# Patient Record
Sex: FEMALE | Race: WHITE | NOT HISPANIC OR LATINO | Employment: OTHER | URBAN - METROPOLITAN AREA
[De-identification: names, ages, dates, MRNs, and addresses within clinical notes are randomized per-mention and may not be internally consistent; named-entity substitution may affect disease eponyms.]

---

## 2023-03-17 LAB
ANION GAP IN SER/PLAS: 14 MMOL/L (ref 10–20)
CALCIUM (MG/DL) IN SER/PLAS: 9.8 MG/DL (ref 8.6–10.3)
CARBON DIOXIDE, TOTAL (MMOL/L) IN SER/PLAS: 26 MMOL/L (ref 21–32)
CHLORIDE (MMOL/L) IN SER/PLAS: 104 MMOL/L (ref 98–107)
CHOLESTEROL (MG/DL) IN SER/PLAS: 169 MG/DL (ref 0–199)
CHOLESTEROL IN HDL (MG/DL) IN SER/PLAS: 36.9 MG/DL
CHOLESTEROL/HDL RATIO: 4.6
CREATININE (MG/DL) IN SER/PLAS: 0.61 MG/DL (ref 0.5–1.05)
GFR FEMALE: >90 ML/MIN/1.73M2
GLUCOSE (MG/DL) IN SER/PLAS: 132 MG/DL (ref 74–99)
LDL: 76 MG/DL (ref 0–99)
NON HDL CHOLESTEROL: 132 MG/DL
POTASSIUM (MMOL/L) IN SER/PLAS: 4.1 MMOL/L (ref 3.5–5.3)
SODIUM (MMOL/L) IN SER/PLAS: 140 MMOL/L (ref 136–145)
THYROTROPIN (MIU/L) IN SER/PLAS BY DETECTION LIMIT <= 0.05 MIU/L: 2.53 MIU/L (ref 0.44–3.98)
TRIGLYCERIDE (MG/DL) IN SER/PLAS: 279 MG/DL (ref 0–149)
UREA NITROGEN (MG/DL) IN SER/PLAS: 22 MG/DL (ref 6–23)
VLDL: 56 MG/DL (ref 0–40)

## 2023-11-04 ENCOUNTER — ANCILLARY PROCEDURE (OUTPATIENT)
Dept: RADIOLOGY | Facility: CLINIC | Age: 73
End: 2023-11-04
Payer: MEDICARE

## 2023-11-04 DIAGNOSIS — M81.0 AGE-RELATED OSTEOPOROSIS WITHOUT CURRENT PATHOLOGICAL FRACTURE: ICD-10-CM

## 2023-11-04 PROCEDURE — 77080 DXA BONE DENSITY AXIAL: CPT | Mod: ONBASE

## 2023-11-04 PROCEDURE — 77080 DXA BONE DENSITY AXIAL: CPT | Mod: ONBASE | Performed by: RADIOLOGY

## 2024-07-15 ENCOUNTER — HOSPITAL ENCOUNTER (INPATIENT)
Facility: HOSPITAL | Age: 74
DRG: 493 | End: 2024-07-15
Attending: ORTHOPAEDIC SURGERY | Admitting: ORTHOPAEDIC SURGERY
Payer: MEDICARE

## 2024-07-15 DIAGNOSIS — S82.892A CLOSED LEFT ANKLE FRACTURE, INITIAL ENCOUNTER: Primary | ICD-10-CM

## 2024-07-15 DIAGNOSIS — I50.30: ICD-10-CM

## 2024-07-15 DIAGNOSIS — I25.10: ICD-10-CM

## 2024-07-15 DIAGNOSIS — M79.89 OTHER SPECIFIED SOFT TISSUE DISORDERS: ICD-10-CM

## 2024-07-15 DIAGNOSIS — R22.43 LOCALIZED SWELLING, MASS AND LUMP, LOWER LIMB, BILATERAL: ICD-10-CM

## 2024-07-15 PROCEDURE — 1100000001 HC PRIVATE ROOM DAILY

## 2024-07-16 ENCOUNTER — ANESTHESIA (OUTPATIENT)
Dept: OPERATING ROOM | Facility: HOSPITAL | Age: 74
End: 2024-07-16
Payer: MEDICARE

## 2024-07-16 ENCOUNTER — APPOINTMENT (OUTPATIENT)
Dept: RADIOLOGY | Facility: HOSPITAL | Age: 74
DRG: 493 | End: 2024-07-16
Payer: MEDICARE

## 2024-07-16 ENCOUNTER — APPOINTMENT (OUTPATIENT)
Dept: CARDIOLOGY | Facility: HOSPITAL | Age: 74
DRG: 493 | End: 2024-07-16
Payer: MEDICARE

## 2024-07-16 ENCOUNTER — APPOINTMENT (OUTPATIENT)
Dept: VASCULAR MEDICINE | Facility: HOSPITAL | Age: 74
DRG: 493 | End: 2024-07-16
Payer: MEDICARE

## 2024-07-16 ENCOUNTER — ANESTHESIA EVENT (OUTPATIENT)
Dept: OPERATING ROOM | Facility: HOSPITAL | Age: 74
End: 2024-07-16
Payer: MEDICARE

## 2024-07-16 PROBLEM — S82.892A CLOSED LEFT ANKLE FRACTURE, INITIAL ENCOUNTER: Status: ACTIVE | Noted: 2024-07-16

## 2024-07-16 LAB
ABO GROUP (TYPE) IN BLOOD: NORMAL
ABO GROUP (TYPE) IN BLOOD: NORMAL
ANION GAP SERPL CALC-SCNC: 16 MMOL/L (ref 10–20)
ANTIBODY SCREEN: NORMAL
AORTIC VALVE PEAK VELOCITY: 1.25 M/S
APTT PPP: 27 SECONDS (ref 27–38)
AV PEAK GRADIENT: 6.3 MMHG
AVA (PEAK VEL): 2.61 CM2
BASOPHILS # BLD AUTO: 0.02 X10*3/UL (ref 0–0.1)
BASOPHILS NFR BLD AUTO: 0.4 %
BNP SERPL-MCNC: 127 PG/ML (ref 0–99)
BUN SERPL-MCNC: 12 MG/DL (ref 6–23)
CALCIUM SERPL-MCNC: 8.3 MG/DL (ref 8.6–10.6)
CHLORIDE SERPL-SCNC: 105 MMOL/L (ref 98–107)
CO2 SERPL-SCNC: 23 MMOL/L (ref 21–32)
CREAT SERPL-MCNC: 0.57 MG/DL (ref 0.5–1.05)
EGFRCR SERPLBLD CKD-EPI 2021: >90 ML/MIN/1.73M*2
EJECTION FRACTION APICAL 4 CHAMBER: 72.8
EJECTION FRACTION: 63 %
EOSINOPHIL # BLD AUTO: 0.24 X10*3/UL (ref 0–0.4)
EOSINOPHIL NFR BLD AUTO: 4.7 %
ERYTHROCYTE [DISTWIDTH] IN BLOOD BY AUTOMATED COUNT: 15.4 % (ref 11.5–14.5)
ERYTHROCYTE [DISTWIDTH] IN BLOOD BY AUTOMATED COUNT: 15.5 % (ref 11.5–14.5)
FERRITIN SERPL-MCNC: 38 NG/ML (ref 8–150)
GLUCOSE SERPL-MCNC: 100 MG/DL (ref 74–99)
HCT VFR BLD AUTO: 28.2 % (ref 36–46)
HCT VFR BLD AUTO: 33.6 % (ref 36–46)
HGB BLD-MCNC: 10.4 G/DL (ref 12–16)
HGB BLD-MCNC: 9 G/DL (ref 12–16)
IMM GRANULOCYTES # BLD AUTO: 0.01 X10*3/UL (ref 0–0.5)
IMM GRANULOCYTES NFR BLD AUTO: 0.2 % (ref 0–0.9)
INR PPP: 1.1 (ref 0.9–1.1)
IRON SATN MFR SERPL: 10 % (ref 25–45)
IRON SERPL-MCNC: 34 UG/DL (ref 35–150)
LEFT VENTRICULAR OUTFLOW TRACT DIAMETER: 2 CM
LYMPHOCYTES # BLD AUTO: 1.44 X10*3/UL (ref 0.8–3)
LYMPHOCYTES NFR BLD AUTO: 28 %
MCH RBC QN AUTO: 24.1 PG (ref 26–34)
MCH RBC QN AUTO: 24.1 PG (ref 26–34)
MCHC RBC AUTO-ENTMCNC: 31 G/DL (ref 32–36)
MCHC RBC AUTO-ENTMCNC: 31.9 G/DL (ref 32–36)
MCV RBC AUTO: 75 FL (ref 80–100)
MCV RBC AUTO: 78 FL (ref 80–100)
MITRAL VALVE E/A RATIO: 0.93
MONOCYTES # BLD AUTO: 0.39 X10*3/UL (ref 0.05–0.8)
MONOCYTES NFR BLD AUTO: 7.6 %
NEUTROPHILS # BLD AUTO: 3.05 X10*3/UL (ref 1.6–5.5)
NEUTROPHILS NFR BLD AUTO: 59.1 %
NRBC BLD-RTO: 0 /100 WBCS (ref 0–0)
NRBC BLD-RTO: 0 /100 WBCS (ref 0–0)
PLATELET # BLD AUTO: 207 X10*3/UL (ref 150–450)
PLATELET # BLD AUTO: 228 X10*3/UL (ref 150–450)
POTASSIUM SERPL-SCNC: 3.4 MMOL/L (ref 3.5–5.3)
PROTHROMBIN TIME: 12.2 SECONDS (ref 9.8–12.8)
RBC # BLD AUTO: 3.74 X10*6/UL (ref 4–5.2)
RBC # BLD AUTO: 4.32 X10*6/UL (ref 4–5.2)
RH FACTOR (ANTIGEN D): NORMAL
RH FACTOR (ANTIGEN D): NORMAL
RIGHT VENTRICLE FREE WALL PEAK S': 13.3 CM/S
SODIUM SERPL-SCNC: 141 MMOL/L (ref 136–145)
TIBC SERPL-MCNC: 353 UG/DL (ref 240–445)
TRICUSPID ANNULAR PLANE SYSTOLIC EXCURSION: 2.2 CM
UIBC SERPL-MCNC: 319 UG/DL (ref 110–370)
WBC # BLD AUTO: 5.2 X10*3/UL (ref 4.4–11.3)
WBC # BLD AUTO: 6.7 X10*3/UL (ref 4.4–11.3)

## 2024-07-16 PROCEDURE — 93970 EXTREMITY STUDY: CPT | Performed by: SURGERY

## 2024-07-16 PROCEDURE — 2500000004 HC RX 250 GENERAL PHARMACY W/ HCPCS (ALT 636 FOR OP/ED): Mod: JZ | Performed by: ANESTHESIOLOGIST ASSISTANT

## 2024-07-16 PROCEDURE — 99223 1ST HOSP IP/OBS HIGH 75: CPT | Performed by: STUDENT IN AN ORGANIZED HEALTH CARE EDUCATION/TRAINING PROGRAM

## 2024-07-16 PROCEDURE — 36415 COLL VENOUS BLD VENIPUNCTURE: CPT

## 2024-07-16 PROCEDURE — 2500000004 HC RX 250 GENERAL PHARMACY W/ HCPCS (ALT 636 FOR OP/ED)

## 2024-07-16 PROCEDURE — 0QSK04Z REPOSITION LEFT FIBULA WITH INTERNAL FIXATION DEVICE, OPEN APPROACH: ICD-10-PCS | Performed by: STUDENT IN AN ORGANIZED HEALTH CARE EDUCATION/TRAINING PROGRAM

## 2024-07-16 PROCEDURE — 93010 ELECTROCARDIOGRAM REPORT: CPT | Performed by: INTERNAL MEDICINE

## 2024-07-16 PROCEDURE — 0QPHX5Z REMOVAL OF EXTERNAL FIXATION DEVICE FROM LEFT TIBIA, EXTERNAL APPROACH: ICD-10-PCS | Performed by: STUDENT IN AN ORGANIZED HEALTH CARE EDUCATION/TRAINING PROGRAM

## 2024-07-16 PROCEDURE — 2500000004 HC RX 250 GENERAL PHARMACY W/ HCPCS (ALT 636 FOR OP/ED): Mod: JZ

## 2024-07-16 PROCEDURE — 1100000001 HC PRIVATE ROOM DAILY

## 2024-07-16 PROCEDURE — 3700000001 HC GENERAL ANESTHESIA TIME - INITIAL BASE CHARGE: Performed by: ORTHOPAEDIC SURGERY

## 2024-07-16 PROCEDURE — 0QSH04Z REPOSITION LEFT TIBIA WITH INTERNAL FIXATION DEVICE, OPEN APPROACH: ICD-10-PCS | Performed by: STUDENT IN AN ORGANIZED HEALTH CARE EDUCATION/TRAINING PROGRAM

## 2024-07-16 PROCEDURE — 85027 COMPLETE CBC AUTOMATED: CPT

## 2024-07-16 PROCEDURE — 85025 COMPLETE CBC W/AUTO DIFF WBC: CPT

## 2024-07-16 PROCEDURE — 99222 1ST HOSP IP/OBS MODERATE 55: CPT

## 2024-07-16 PROCEDURE — 73700 CT LOWER EXTREMITY W/O DYE: CPT | Mod: LEFT SIDE | Performed by: RADIOLOGY

## 2024-07-16 PROCEDURE — 2500000005 HC RX 250 GENERAL PHARMACY W/O HCPCS: Performed by: ORTHOPAEDIC SURGERY

## 2024-07-16 PROCEDURE — 73610 X-RAY EXAM OF ANKLE: CPT | Mod: LEFT SIDE | Performed by: STUDENT IN AN ORGANIZED HEALTH CARE EDUCATION/TRAINING PROGRAM

## 2024-07-16 PROCEDURE — 93306 TTE W/DOPPLER COMPLETE: CPT

## 2024-07-16 PROCEDURE — 7100000001 HC RECOVERY ROOM TIME - INITIAL BASE CHARGE: Performed by: ORTHOPAEDIC SURGERY

## 2024-07-16 PROCEDURE — 83880 ASSAY OF NATRIURETIC PEPTIDE: CPT | Performed by: ANESTHESIOLOGY

## 2024-07-16 PROCEDURE — 85610 PROTHROMBIN TIME: CPT

## 2024-07-16 PROCEDURE — 2500000005 HC RX 250 GENERAL PHARMACY W/O HCPCS: Performed by: ANESTHESIOLOGIST ASSISTANT

## 2024-07-16 PROCEDURE — 2780000003 HC OR 278 NO HCPCS: Performed by: ORTHOPAEDIC SURGERY

## 2024-07-16 PROCEDURE — 82374 ASSAY BLOOD CARBON DIOXIDE: CPT

## 2024-07-16 PROCEDURE — 3700000002 HC GENERAL ANESTHESIA TIME - EACH INCREMENTAL 1 MINUTE: Performed by: ORTHOPAEDIC SURGERY

## 2024-07-16 PROCEDURE — 82728 ASSAY OF FERRITIN: CPT | Performed by: ANESTHESIOLOGY

## 2024-07-16 PROCEDURE — 71045 X-RAY EXAM CHEST 1 VIEW: CPT

## 2024-07-16 PROCEDURE — 20692 APPL MLTPLN UNI EXT FIXJ SYS: CPT | Performed by: ORTHOPAEDIC SURGERY

## 2024-07-16 PROCEDURE — 2720000007 HC OR 272 NO HCPCS: Performed by: ORTHOPAEDIC SURGERY

## 2024-07-16 PROCEDURE — C1713 ANCHOR/SCREW BN/BN,TIS/BN: HCPCS | Performed by: ORTHOPAEDIC SURGERY

## 2024-07-16 PROCEDURE — 73700 CT LOWER EXTREMITY W/O DYE: CPT | Mod: LT

## 2024-07-16 PROCEDURE — 3600000004 HC OR TIME - INITIAL BASE CHARGE - PROCEDURE LEVEL FOUR: Performed by: ORTHOPAEDIC SURGERY

## 2024-07-16 PROCEDURE — 93970 EXTREMITY STUDY: CPT

## 2024-07-16 PROCEDURE — 83540 ASSAY OF IRON: CPT | Performed by: ANESTHESIOLOGY

## 2024-07-16 PROCEDURE — 2500000004 HC RX 250 GENERAL PHARMACY W/ HCPCS (ALT 636 FOR OP/ED): Performed by: STUDENT IN AN ORGANIZED HEALTH CARE EDUCATION/TRAINING PROGRAM

## 2024-07-16 PROCEDURE — 73610 X-RAY EXAM OF ANKLE: CPT | Mod: LT

## 2024-07-16 PROCEDURE — 2500000005 HC RX 250 GENERAL PHARMACY W/O HCPCS

## 2024-07-16 PROCEDURE — 93306 TTE W/DOPPLER COMPLETE: CPT | Performed by: INTERNAL MEDICINE

## 2024-07-16 PROCEDURE — 86901 BLOOD TYPING SEROLOGIC RH(D): CPT

## 2024-07-16 PROCEDURE — 71045 X-RAY EXAM CHEST 1 VIEW: CPT | Performed by: RADIOLOGY

## 2024-07-16 PROCEDURE — 99222 1ST HOSP IP/OBS MODERATE 55: CPT | Performed by: STUDENT IN AN ORGANIZED HEALTH CARE EDUCATION/TRAINING PROGRAM

## 2024-07-16 PROCEDURE — 7100000002 HC RECOVERY ROOM TIME - EACH INCREMENTAL 1 MINUTE: Performed by: ORTHOPAEDIC SURGERY

## 2024-07-16 PROCEDURE — 3600000009 HC OR TIME - EACH INCREMENTAL 1 MINUTE - PROCEDURE LEVEL FOUR: Performed by: ORTHOPAEDIC SURGERY

## 2024-07-16 DEVICE — CLAMP, PIN 10H HII MRI: Type: IMPLANTABLE DEVICE | Site: ANKLE | Status: FUNCTIONAL

## 2024-07-16 DEVICE — ROD, CONNECTING 11 X 150 HOFFMANN3: Type: IMPLANTABLE DEVICE | Site: ANKLE | Status: FUNCTIONAL

## 2024-07-16 DEVICE — PIN, HALF 3/5 20 X 120 SD APEX: Type: IMPLANTABLE DEVICE | Site: ANKLE | Status: FUNCTIONAL

## 2024-07-16 DEVICE — PIN, HALF 5 X 150 SD 50/THR APEX: Type: IMPLANTABLE DEVICE | Site: ANKLE | Status: FUNCTIONAL

## 2024-07-16 DEVICE — CAP, PROTECTIVE 5MM BLUE: Type: IMPLANTABLE DEVICE | Site: ANKLE | Status: NON-FUNCTIONAL

## 2024-07-16 DEVICE — ROD, CONNECTING 11 X 250 HOFFMANN3: Type: IMPLANTABLE DEVICE | Site: ANKLE | Status: FUNCTIONAL

## 2024-07-16 DEVICE — IMPLANTABLE DEVICE: Type: IMPLANTABLE DEVICE | Site: ANKLE | Status: FUNCTIONAL

## 2024-07-16 DEVICE — PIN, TRANSFIX 5/6 40 X 300 APEX: Type: IMPLANTABLE DEVICE | Site: ANKLE | Status: FUNCTIONAL

## 2024-07-16 DEVICE — POST, 30 DEG ANGELED, 11MM: Type: IMPLANTABLE DEVICE | Site: ANKLE | Status: FUNCTIONAL

## 2024-07-16 RX ORDER — SODIUM CHLORIDE, SODIUM LACTATE, POTASSIUM CHLORIDE, CALCIUM CHLORIDE 600; 310; 30; 20 MG/100ML; MG/100ML; MG/100ML; MG/100ML
125 INJECTION, SOLUTION INTRAVENOUS CONTINUOUS
Status: DISCONTINUED | OUTPATIENT
Start: 2024-07-16 | End: 2024-07-16

## 2024-07-16 RX ORDER — SUCRALFATE 1 G/1
1 TABLET ORAL 2 TIMES DAILY
COMMUNITY

## 2024-07-16 RX ORDER — ACETAMINOPHEN 325 MG/1
650 TABLET ORAL EVERY 4 HOURS PRN
Status: DISCONTINUED | OUTPATIENT
Start: 2024-07-16 | End: 2024-07-16 | Stop reason: HOSPADM

## 2024-07-16 RX ORDER — ROCURONIUM BROMIDE 10 MG/ML
INJECTION, SOLUTION INTRAVENOUS AS NEEDED
Status: DISCONTINUED | OUTPATIENT
Start: 2024-07-16 | End: 2024-07-16

## 2024-07-16 RX ORDER — FENTANYL CITRATE 50 UG/ML
INJECTION, SOLUTION INTRAMUSCULAR; INTRAVENOUS AS NEEDED
Status: DISCONTINUED | OUTPATIENT
Start: 2024-07-16 | End: 2024-07-16

## 2024-07-16 RX ORDER — METHOCARBAMOL 100 MG/ML
500 INJECTION, SOLUTION INTRAMUSCULAR; INTRAVENOUS ONCE
Status: DISCONTINUED | OUTPATIENT
Start: 2024-07-16 | End: 2024-07-16

## 2024-07-16 RX ORDER — OXYCODONE HYDROCHLORIDE 5 MG/1
5 TABLET ORAL EVERY 4 HOURS PRN
Status: DISCONTINUED | OUTPATIENT
Start: 2024-07-16 | End: 2024-07-16

## 2024-07-16 RX ORDER — PROPOFOL 10 MG/ML
INJECTION, EMULSION INTRAVENOUS AS NEEDED
Status: DISCONTINUED | OUTPATIENT
Start: 2024-07-16 | End: 2024-07-16

## 2024-07-16 RX ORDER — ACETAMINOPHEN 325 MG/1
650 TABLET ORAL EVERY 4 HOURS PRN
Status: DISCONTINUED | OUTPATIENT
Start: 2024-07-16 | End: 2024-07-16

## 2024-07-16 RX ORDER — ALBUTEROL SULFATE 90 UG/1
2 AEROSOL, METERED RESPIRATORY (INHALATION) EVERY 4 HOURS PRN
COMMUNITY
Start: 2024-07-14

## 2024-07-16 RX ORDER — IPRATROPIUM BROMIDE AND ALBUTEROL SULFATE 2.5; .5 MG/3ML; MG/3ML
3 SOLUTION RESPIRATORY (INHALATION)
COMMUNITY
Start: 2023-08-17

## 2024-07-16 RX ORDER — MONTELUKAST SODIUM 5 MG/1
10 TABLET, CHEWABLE ORAL NIGHTLY
Status: DISCONTINUED | OUTPATIENT
Start: 2024-07-16 | End: 2024-07-16

## 2024-07-16 RX ORDER — SUCRALFATE 1 G/1
1 TABLET ORAL 2 TIMES DAILY
Status: DISCONTINUED | OUTPATIENT
Start: 2024-07-16 | End: 2024-07-16

## 2024-07-16 RX ORDER — FLUTICASONE PROPIONATE 50 MCG
2 SPRAY, SUSPENSION (ML) NASAL 2 TIMES DAILY
Status: DISCONTINUED | OUTPATIENT
Start: 2024-07-16 | End: 2024-07-16

## 2024-07-16 RX ORDER — ENOXAPARIN SODIUM 100 MG/ML
30 INJECTION SUBCUTANEOUS EVERY 12 HOURS
Status: DISPENSED | OUTPATIENT
Start: 2024-07-16 | End: 2024-07-19

## 2024-07-16 RX ORDER — OXYCODONE HYDROCHLORIDE 5 MG/1
5 TABLET ORAL EVERY 6 HOURS PRN
Status: DISCONTINUED | OUTPATIENT
Start: 2024-07-16 | End: 2024-07-22

## 2024-07-16 RX ORDER — ACETAMINOPHEN 10 MG/ML
INJECTION, SOLUTION INTRAVENOUS AS NEEDED
Status: DISCONTINUED | OUTPATIENT
Start: 2024-07-16 | End: 2024-07-16

## 2024-07-16 RX ORDER — ROPIVACAINE IN 0.9% SOD CHL/PF 0.2 %
7 PLASTIC BAG, INJECTION (ML) EPIDURAL CONTINUOUS
Status: DISCONTINUED | OUTPATIENT
Start: 2024-07-16 | End: 2024-07-16

## 2024-07-16 RX ORDER — NALOXONE HYDROCHLORIDE 0.4 MG/ML
0.2 INJECTION, SOLUTION INTRAMUSCULAR; INTRAVENOUS; SUBCUTANEOUS EVERY 5 MIN PRN
Status: DISCONTINUED | OUTPATIENT
Start: 2024-07-16 | End: 2024-07-16

## 2024-07-16 RX ORDER — OXYCODONE HYDROCHLORIDE 5 MG/1
10 TABLET ORAL EVERY 4 HOURS PRN
Status: DISCONTINUED | OUTPATIENT
Start: 2024-07-16 | End: 2024-07-16

## 2024-07-16 RX ORDER — SODIUM CHLORIDE 0.65 %
2 AEROSOL, SPRAY (ML) NASAL AS NEEDED
COMMUNITY
Start: 2024-07-14

## 2024-07-16 RX ORDER — DROPERIDOL 2.5 MG/ML
0.62 INJECTION, SOLUTION INTRAMUSCULAR; INTRAVENOUS ONCE AS NEEDED
Status: DISCONTINUED | OUTPATIENT
Start: 2024-07-16 | End: 2024-07-16 | Stop reason: HOSPADM

## 2024-07-16 RX ORDER — LEVOTHYROXINE SODIUM 100 UG/1
100 TABLET ORAL DAILY
Status: DISCONTINUED | OUTPATIENT
Start: 2024-07-16 | End: 2024-07-16

## 2024-07-16 RX ORDER — METHOCARBAMOL 500 MG/1
750 TABLET, FILM COATED ORAL EVERY 8 HOURS PRN
Status: DISCONTINUED | OUTPATIENT
Start: 2024-07-16 | End: 2024-07-29 | Stop reason: HOSPADM

## 2024-07-16 RX ORDER — POTASSIUM CHLORIDE 1.5 G/1.58G
POWDER, FOR SOLUTION ORAL
COMMUNITY

## 2024-07-16 RX ORDER — ONDANSETRON HYDROCHLORIDE 2 MG/ML
4 INJECTION, SOLUTION INTRAVENOUS EVERY 8 HOURS PRN
Status: DISCONTINUED | OUTPATIENT
Start: 2024-07-16 | End: 2024-07-22

## 2024-07-16 RX ORDER — LIDOCAINE HYDROCHLORIDE 20 MG/ML
INJECTION, SOLUTION INFILTRATION; PERINEURAL AS NEEDED
Status: DISCONTINUED | OUTPATIENT
Start: 2024-07-16 | End: 2024-07-16

## 2024-07-16 RX ORDER — HYDROMORPHONE HYDROCHLORIDE 1 MG/ML
0.2 INJECTION, SOLUTION INTRAMUSCULAR; INTRAVENOUS; SUBCUTANEOUS EVERY 5 MIN PRN
Status: DISCONTINUED | OUTPATIENT
Start: 2024-07-16 | End: 2024-07-16 | Stop reason: HOSPADM

## 2024-07-16 RX ORDER — ALPRAZOLAM 1 MG/1
1 TABLET, EXTENDED RELEASE ORAL NIGHTLY
Status: ON HOLD | COMMUNITY
End: 2024-07-16 | Stop reason: WASHOUT

## 2024-07-16 RX ORDER — POLYETHYLENE GLYCOL 3350 17 G/17G
17 POWDER, FOR SOLUTION ORAL DAILY
Status: DISCONTINUED | OUTPATIENT
Start: 2024-07-16 | End: 2024-07-16

## 2024-07-16 RX ORDER — MIDAZOLAM HYDROCHLORIDE 1 MG/ML
INJECTION INTRAMUSCULAR; INTRAVENOUS AS NEEDED
Status: DISCONTINUED | OUTPATIENT
Start: 2024-07-16 | End: 2024-07-16

## 2024-07-16 RX ORDER — ONDANSETRON 4 MG/1
4 TABLET, FILM COATED ORAL EVERY 8 HOURS PRN
Status: DISCONTINUED | OUTPATIENT
Start: 2024-07-16 | End: 2024-07-29 | Stop reason: HOSPADM

## 2024-07-16 RX ORDER — FLUTICASONE FUROATE, UMECLIDINIUM BROMIDE AND VILANTEROL TRIFENATATE 200; 62.5; 25 UG/1; UG/1; UG/1
1 POWDER RESPIRATORY (INHALATION) DAILY
COMMUNITY

## 2024-07-16 RX ORDER — HYDROMORPHONE HYDROCHLORIDE 1 MG/ML
0.5 INJECTION, SOLUTION INTRAMUSCULAR; INTRAVENOUS; SUBCUTANEOUS EVERY 2 HOUR PRN
Status: DISCONTINUED | OUTPATIENT
Start: 2024-07-16 | End: 2024-07-16

## 2024-07-16 RX ORDER — OXYCODONE HYDROCHLORIDE 5 MG/1
5 TABLET ORAL EVERY 4 HOURS PRN
Status: DISCONTINUED | OUTPATIENT
Start: 2024-07-16 | End: 2024-07-16 | Stop reason: HOSPADM

## 2024-07-16 RX ORDER — CLINDAMYCIN PHOSPHATE 600 MG/50ML
600 INJECTION, SOLUTION INTRAVENOUS EVERY 8 HOURS
Status: DISCONTINUED | OUTPATIENT
Start: 2024-07-16 | End: 2024-07-17

## 2024-07-16 RX ORDER — OXYCODONE HYDROCHLORIDE 5 MG/1
10 TABLET ORAL EVERY 4 HOURS PRN
Status: DISCONTINUED | OUTPATIENT
Start: 2024-07-16 | End: 2024-07-22

## 2024-07-16 RX ORDER — ALBUTEROL SULFATE 0.83 MG/ML
2.5 SOLUTION RESPIRATORY (INHALATION) ONCE AS NEEDED
Status: DISCONTINUED | OUTPATIENT
Start: 2024-07-16 | End: 2024-07-16 | Stop reason: HOSPADM

## 2024-07-16 RX ORDER — ACETAMINOPHEN 500 MG
1000 TABLET ORAL EVERY 6 HOURS PRN
COMMUNITY
End: 2024-07-29 | Stop reason: HOSPADM

## 2024-07-16 RX ORDER — ONDANSETRON HYDROCHLORIDE 2 MG/ML
4 INJECTION, SOLUTION INTRAVENOUS EVERY 8 HOURS PRN
Status: DISCONTINUED | OUTPATIENT
Start: 2024-07-16 | End: 2024-07-29 | Stop reason: HOSPADM

## 2024-07-16 RX ORDER — HYDROMORPHONE HYDROCHLORIDE 1 MG/ML
0.5 INJECTION, SOLUTION INTRAMUSCULAR; INTRAVENOUS; SUBCUTANEOUS EVERY 5 MIN PRN
Status: DISCONTINUED | OUTPATIENT
Start: 2024-07-16 | End: 2024-07-16 | Stop reason: HOSPADM

## 2024-07-16 RX ORDER — ALBUTEROL SULFATE 90 UG/1
2 AEROSOL, METERED RESPIRATORY (INHALATION) EVERY 6 HOURS PRN
Status: DISCONTINUED | OUTPATIENT
Start: 2024-07-16 | End: 2024-07-16

## 2024-07-16 RX ORDER — ALUMINUM HYDROXIDE, MAGNESIUM HYDROXIDE, AND SIMETHICONE 2400; 240; 2400 MG/30ML; MG/30ML; MG/30ML
5 SUSPENSION ORAL AS NEEDED
COMMUNITY

## 2024-07-16 RX ORDER — POLYETHYLENE GLYCOL 3350 17 G/17G
17 POWDER, FOR SOLUTION ORAL DAILY
Status: DISCONTINUED | OUTPATIENT
Start: 2024-07-16 | End: 2024-07-29 | Stop reason: HOSPADM

## 2024-07-16 RX ORDER — SODIUM CHLORIDE, SODIUM LACTATE, POTASSIUM CHLORIDE, CALCIUM CHLORIDE 600; 310; 30; 20 MG/100ML; MG/100ML; MG/100ML; MG/100ML
100 INJECTION, SOLUTION INTRAVENOUS CONTINUOUS
Status: DISCONTINUED | OUTPATIENT
Start: 2024-07-16 | End: 2024-07-16 | Stop reason: HOSPADM

## 2024-07-16 RX ORDER — ONDANSETRON 4 MG/1
4 TABLET, ORALLY DISINTEGRATING ORAL EVERY 8 HOURS PRN
Status: DISCONTINUED | OUTPATIENT
Start: 2024-07-16 | End: 2024-07-16

## 2024-07-16 RX ORDER — OXYCODONE HYDROCHLORIDE 5 MG/1
10 TABLET ORAL EVERY 4 HOURS PRN
Status: DISCONTINUED | OUTPATIENT
Start: 2024-07-16 | End: 2024-07-16 | Stop reason: HOSPADM

## 2024-07-16 RX ORDER — POTASSIUM CHLORIDE 1.5 G/1.58G
20 POWDER, FOR SOLUTION ORAL 2 TIMES DAILY
Status: DISCONTINUED | OUTPATIENT
Start: 2024-07-16 | End: 2024-07-16

## 2024-07-16 RX ORDER — ONDANSETRON HYDROCHLORIDE 2 MG/ML
4 INJECTION, SOLUTION INTRAVENOUS ONCE AS NEEDED
Status: DISCONTINUED | OUTPATIENT
Start: 2024-07-16 | End: 2024-07-16 | Stop reason: HOSPADM

## 2024-07-16 RX ORDER — FLUTICASONE FUROATE AND VILANTEROL 100; 25 UG/1; UG/1
1 POWDER RESPIRATORY (INHALATION)
Status: DISCONTINUED | OUTPATIENT
Start: 2024-07-16 | End: 2024-07-16

## 2024-07-16 RX ORDER — FUROSEMIDE 40 MG/1
TABLET ORAL
COMMUNITY

## 2024-07-16 RX ORDER — NALOXONE HYDROCHLORIDE 0.4 MG/ML
0.2 INJECTION, SOLUTION INTRAMUSCULAR; INTRAVENOUS; SUBCUTANEOUS EVERY 5 MIN PRN
Status: DISCONTINUED | OUTPATIENT
Start: 2024-07-16 | End: 2024-07-29 | Stop reason: HOSPADM

## 2024-07-16 RX ORDER — LEVOTHYROXINE SODIUM 100 UG/1
100 TABLET ORAL DAILY
COMMUNITY

## 2024-07-16 RX ORDER — DOCUSATE SODIUM 100 MG/1
100 CAPSULE, LIQUID FILLED ORAL 2 TIMES DAILY
COMMUNITY

## 2024-07-16 RX ORDER — CLINDAMYCIN PHOSPHATE 900 MG/50ML
INJECTION, SOLUTION INTRAVENOUS AS NEEDED
Status: DISCONTINUED | OUTPATIENT
Start: 2024-07-16 | End: 2024-07-16

## 2024-07-16 RX ORDER — ROPIVACAINE IN 0.9% SOD CHL/PF 0.2 %
PLASTIC BAG, INJECTION (ML) EPIDURAL AS NEEDED
Status: DISCONTINUED | OUTPATIENT
Start: 2024-07-16 | End: 2024-07-16

## 2024-07-16 RX ORDER — BISACODYL 5 MG
10 TABLET, DELAYED RELEASE (ENTERIC COATED) ORAL DAILY PRN
Status: DISCONTINUED | OUTPATIENT
Start: 2024-07-16 | End: 2024-07-16

## 2024-07-16 RX ORDER — FUROSEMIDE 40 MG/1
40 TABLET ORAL EVERY OTHER DAY
Status: DISCONTINUED | OUTPATIENT
Start: 2024-07-16 | End: 2024-07-16

## 2024-07-16 RX ORDER — CLINDAMYCIN PHOSPHATE 600 MG/50ML
600 INJECTION, SOLUTION INTRAVENOUS EVERY 8 HOURS
Status: DISCONTINUED | OUTPATIENT
Start: 2024-07-16 | End: 2024-07-16

## 2024-07-16 RX ORDER — SODIUM CHLORIDE, SODIUM LACTATE, POTASSIUM CHLORIDE, CALCIUM CHLORIDE 600; 310; 30; 20 MG/100ML; MG/100ML; MG/100ML; MG/100ML
INJECTION, SOLUTION INTRAVENOUS CONTINUOUS PRN
Status: DISCONTINUED | OUTPATIENT
Start: 2024-07-16 | End: 2024-07-16

## 2024-07-16 RX ORDER — SENNOSIDES 8.6 MG/1
2 TABLET ORAL 2 TIMES DAILY
Status: DISCONTINUED | OUTPATIENT
Start: 2024-07-16 | End: 2024-07-16

## 2024-07-16 RX ORDER — SODIUM CHLORIDE 0.9 G/100ML
IRRIGANT IRRIGATION AS NEEDED
Status: DISCONTINUED | OUTPATIENT
Start: 2024-07-16 | End: 2024-07-16 | Stop reason: HOSPADM

## 2024-07-16 SDOH — SOCIAL STABILITY: SOCIAL INSECURITY: DO YOU FEEL UNSAFE GOING BACK TO THE PLACE WHERE YOU ARE LIVING?: NO

## 2024-07-16 SDOH — SOCIAL STABILITY: SOCIAL INSECURITY: DOES ANYONE TRY TO KEEP YOU FROM HAVING/CONTACTING OTHER FRIENDS OR DOING THINGS OUTSIDE YOUR HOME?: NO

## 2024-07-16 SDOH — SOCIAL STABILITY: SOCIAL INSECURITY: ABUSE: ADULT

## 2024-07-16 SDOH — SOCIAL STABILITY: SOCIAL INSECURITY: DO YOU FEEL ANYONE HAS EXPLOITED OR TAKEN ADVANTAGE OF YOU FINANCIALLY OR OF YOUR PERSONAL PROPERTY?: NO

## 2024-07-16 SDOH — SOCIAL STABILITY: SOCIAL INSECURITY: HAS ANYONE EVER THREATENED TO HURT YOUR FAMILY OR YOUR PETS?: NO

## 2024-07-16 SDOH — SOCIAL STABILITY: SOCIAL INSECURITY: ARE THERE ANY APPARENT SIGNS OF INJURIES/BEHAVIORS THAT COULD BE RELATED TO ABUSE/NEGLECT?: NO

## 2024-07-16 SDOH — SOCIAL STABILITY: SOCIAL INSECURITY: HAVE YOU HAD ANY THOUGHTS OF HARMING ANYONE ELSE?: NO

## 2024-07-16 SDOH — SOCIAL STABILITY: SOCIAL INSECURITY: ARE YOU OR HAVE YOU BEEN THREATENED OR ABUSED PHYSICALLY, EMOTIONALLY, OR SEXUALLY BY ANYONE?: NO

## 2024-07-16 SDOH — SOCIAL STABILITY: SOCIAL INSECURITY: HAVE YOU HAD THOUGHTS OF HARMING ANYONE ELSE?: NO

## 2024-07-16 SDOH — SOCIAL STABILITY: SOCIAL INSECURITY: WERE YOU ABLE TO COMPLETE ALL THE BEHAVIORAL HEALTH SCREENINGS?: YES

## 2024-07-16 ASSESSMENT — PATIENT HEALTH QUESTIONNAIRE - PHQ9
1. LITTLE INTEREST OR PLEASURE IN DOING THINGS: NOT AT ALL
SUM OF ALL RESPONSES TO PHQ9 QUESTIONS 1 & 2: 0
2. FEELING DOWN, DEPRESSED OR HOPELESS: NOT AT ALL

## 2024-07-16 ASSESSMENT — ACTIVITIES OF DAILY LIVING (ADL)
JUDGMENT_ADEQUATE_SAFELY_COMPLETE_DAILY_ACTIVITIES: YES
ADEQUATE_TO_COMPLETE_ADL: YES
WALKS IN HOME: INDEPENDENT
TOILETING: INDEPENDENT
FEEDING YOURSELF: INDEPENDENT
HEARING - RIGHT EAR: FUNCTIONAL
HEARING - LEFT EAR: FUNCTIONAL
GROOMING: INDEPENDENT
LACK_OF_TRANSPORTATION: NO
PATIENT'S MEMORY ADEQUATE TO SAFELY COMPLETE DAILY ACTIVITIES?: YES
DRESSING YOURSELF: INDEPENDENT
BATHING: INDEPENDENT

## 2024-07-16 ASSESSMENT — ENCOUNTER SYMPTOMS
DIAPHORESIS: 0
WHEEZING: 0
DIFFICULTY URINATING: 0
DIZZINESS: 0
HEADACHES: 0
CHILLS: 0
SHORTNESS OF BREATH: 0
FEVER: 0
PALPITATIONS: 0
LIGHT-HEADEDNESS: 0
ACTIVITY CHANGE: 1
FATIGUE: 0
ABDOMINAL PAIN: 0
CHEST TIGHTNESS: 0

## 2024-07-16 ASSESSMENT — COGNITIVE AND FUNCTIONAL STATUS - GENERAL
MOBILITY SCORE: 20
MOVING TO AND FROM BED TO CHAIR: A LITTLE
CLIMB 3 TO 5 STEPS WITH RAILING: A LOT
PATIENT BASELINE BEDBOUND: NO
DAILY ACTIVITIY SCORE: 24
WALKING IN HOSPITAL ROOM: A LITTLE
MOBILITY SCORE: 24

## 2024-07-16 ASSESSMENT — LIFESTYLE VARIABLES
HOW MANY STANDARD DRINKS CONTAINING ALCOHOL DO YOU HAVE ON A TYPICAL DAY: PATIENT DOES NOT DRINK
AUDIT-C TOTAL SCORE: 0
HOW OFTEN DO YOU HAVE A DRINK CONTAINING ALCOHOL: NEVER
AUDIT-C TOTAL SCORE: 0
HOW OFTEN DO YOU HAVE 6 OR MORE DRINKS ON ONE OCCASION: NEVER
SKIP TO QUESTIONS 9-10: 1

## 2024-07-16 ASSESSMENT — COLUMBIA-SUICIDE SEVERITY RATING SCALE - C-SSRS
1. IN THE PAST MONTH, HAVE YOU WISHED YOU WERE DEAD OR WISHED YOU COULD GO TO SLEEP AND NOT WAKE UP?: NO
1. IN THE PAST MONTH, HAVE YOU WISHED YOU WERE DEAD OR WISHED YOU COULD GO TO SLEEP AND NOT WAKE UP?: NO
2. HAVE YOU ACTUALLY HAD ANY THOUGHTS OF KILLING YOURSELF?: NO
2. HAVE YOU ACTUALLY HAD ANY THOUGHTS OF KILLING YOURSELF?: NO
6. HAVE YOU EVER DONE ANYTHING, STARTED TO DO ANYTHING, OR PREPARED TO DO ANYTHING TO END YOUR LIFE?: NO
6. HAVE YOU EVER DONE ANYTHING, STARTED TO DO ANYTHING, OR PREPARED TO DO ANYTHING TO END YOUR LIFE?: NO

## 2024-07-16 ASSESSMENT — PAIN - FUNCTIONAL ASSESSMENT
PAIN_FUNCTIONAL_ASSESSMENT: 0-10

## 2024-07-16 ASSESSMENT — PAIN SCALES - GENERAL
PAINLEVEL_OUTOF10: 0 - NO PAIN

## 2024-07-16 NOTE — CONSULTS
Consults  Acute Pain Service  Renee Lombardo is a 73 y.o. year old female patient who presents for ORIF L ankle with Dr. Mann. Acute Pain consulted for block for postoperative pain control.     Anticipated Postop Pain Issues -   Palliative: typically relieved with IV analgesics and regional local anesthetics  Provocative: typically with movement  Quality: typically burning and aching  Radiation: typically none  Severity: typically severe 8-10/10  Timing: typically constant    Past Medical History:   Diagnosis Date    COPD (chronic obstructive pulmonary disease) (Multi)     Disease of thyroid gland     GERD (gastroesophageal reflux disease)     Hypothyroidism         Past Surgical History:   Procedure Laterality Date    OTHER SURGICAL HISTORY  03/23/2015    Percutaneous Vertebral Augmentation Kyphoplasty        No family history on file.     Social History     Socioeconomic History    Marital status:      Spouse name: Not on file    Number of children: Not on file    Years of education: Not on file    Highest education level: Not on file   Occupational History    Not on file   Tobacco Use    Smoking status: Never    Smokeless tobacco: Never   Substance and Sexual Activity    Alcohol use: Yes     Comment: SOCIALLY    Drug use: Never    Sexual activity: Defer   Other Topics Concern    Not on file   Social History Narrative    Not on file     Social Determinants of Health     Financial Resource Strain: Low Risk  (7/16/2024)    Overall Financial Resource Strain (CARDIA)     Difficulty of Paying Living Expenses: Not hard at all   Food Insecurity: Not on file   Transportation Needs: No Transportation Needs (7/16/2024)    PRAPARE - Transportation     Lack of Transportation (Medical): No     Lack of Transportation (Non-Medical): No   Physical Activity: Not on file   Stress: Not on file   Social Connections: Not on file   Intimate Partner Violence: Not on file   Housing Stability: Low Risk  (7/16/2024)    Housing  Stability Vital Sign     Unable to Pay for Housing in the Last Year: No     Number of Times Moved in the Last Year: 1     Homeless in the Last Year: No        Allergies   Allergen Reactions    Ceftriaxone Unknown    Iodinated Contrast Media Unknown    Sulfamide Unknown     SULFA DRUGS         Review of Systems  Gen: No fatigue, anorexia, insomnia, fever.   Eyes: No vision loss, double vision, drainage, eye pain.   ENT: No pharyngitis, dry mouth, no hearing changes or ear discharge  Cardiac: No chest pain, palpitations, syncope, near syncope.   Pulmonary: No shortness of breath, cough, hemoptysis.   Heme/lymph: No swollen glands, fever, bleeding.   GI: No abdominal pain, change in bowel habits, melena, hematemesis, hematochezia, nausea, vomiting, diarrhea.   : No discharge, dysuria, frequency, urgency, hematuria.  Endo: No polyuria or weight loss.   Musculoskeletal: Negative for any pain or loss of ROM/weakness  Skin: No rashes or lesions  Neuro: Normal speech, no numbness or weakness. No gait difficulties  Review of systems is otherwise negative unless stated above or in history of present illness.    Physical Exam:  Constitutional:  no distress, alert and cooperative  Eyes: clear sclera  Head/Neck: No apparent injury, trachea midline  Respiratory/Thorax: Patent airways, thorax symmetric, breathing comfortably  Cardiovascular: no pitting edema  Gastrointestinal: Nondistended  Musculoskeletal: LLE wrapped in ace  Extremities: no clubbing  Neurological: alert, irene x4  Psychological: Appropriate affect    Results for orders placed or performed during the hospital encounter of 07/15/24 (from the past 24 hour(s))   CBC and Auto Differential   Result Value Ref Range    WBC 5.2 4.4 - 11.3 x10*3/uL    nRBC 0.0 0.0 - 0.0 /100 WBCs    RBC 3.74 (L) 4.00 - 5.20 x10*6/uL    Hemoglobin 9.0 (L) 12.0 - 16.0 g/dL    Hematocrit 28.2 (L) 36.0 - 46.0 %    MCV 75 (L) 80 - 100 fL    MCH 24.1 (L) 26.0 - 34.0 pg    MCHC 31.9 (L) 32.0 -  36.0 g/dL    RDW 15.5 (H) 11.5 - 14.5 %    Platelets 228 150 - 450 x10*3/uL    Neutrophils % 59.1 40.0 - 80.0 %    Immature Granulocytes %, Automated 0.2 0.0 - 0.9 %    Lymphocytes % 28.0 13.0 - 44.0 %    Monocytes % 7.6 2.0 - 10.0 %    Eosinophils % 4.7 0.0 - 6.0 %    Basophils % 0.4 0.0 - 2.0 %    Neutrophils Absolute 3.05 1.60 - 5.50 x10*3/uL    Immature Granulocytes Absolute, Automated 0.01 0.00 - 0.50 x10*3/uL    Lymphocytes Absolute 1.44 0.80 - 3.00 x10*3/uL    Monocytes Absolute 0.39 0.05 - 0.80 x10*3/uL    Eosinophils Absolute 0.24 0.00 - 0.40 x10*3/uL    Basophils Absolute 0.02 0.00 - 0.10 x10*3/uL   Basic metabolic panel   Result Value Ref Range    Glucose 100 (H) 74 - 99 mg/dL    Sodium 141 136 - 145 mmol/L    Potassium 3.4 (L) 3.5 - 5.3 mmol/L    Chloride 105 98 - 107 mmol/L    Bicarbonate 23 21 - 32 mmol/L    Anion Gap 16 10 - 20 mmol/L    Urea Nitrogen 12 6 - 23 mg/dL    Creatinine 0.57 0.50 - 1.05 mg/dL    eGFR >90 >60 mL/min/1.73m*2    Calcium 8.3 (L) 8.6 - 10.6 mg/dL   Coagulation Screen   Result Value Ref Range    Protime 12.2 9.8 - 12.8 seconds    INR 1.1 0.9 - 1.1    aPTT 27 27 - 38 seconds   Type And Screen   Result Value Ref Range    ABO TYPE O     Rh TYPE POS     ANTIBODY SCREEN NEG    Iron and TIBC   Result Value Ref Range    Iron 34 (L) 35 - 150 ug/dL    UIBC 319 110 - 370 ug/dL    TIBC 353 240 - 445 ug/dL    % Saturation 10 (L) 25 - 45 %   Ferritin   Result Value Ref Range    Ferritin 38 8 - 150 ng/mL   B-type natriuretic peptide   Result Value Ref Range     (H) 0 - 99 pg/mL   VERIFY ABO/Rh Group Test   Result Value Ref Range    ABO TYPE O     Rh TYPE POS    Transthoracic Echo (TTE) Complete   Result Value Ref Range    AV pk kaya 1.25 m/s    LVOT diam 2.00 cm    MV E/A ratio 0.93     Tricuspid annular plane systolic excursion 2.2 cm    LV EF 63 %    RV free wall pk S' 13.30 cm/s    Aortic Valve Area by Continuity of Peak Velocity 2.61 cm2    AV pk grad 6.3 mmHg    LV A4C EF 72.8     Lower extremity venous duplex bilateral   Result Value Ref Range    BSA 1.63 m2        Plan:  - adductor canal and popliteal blocks with catheters performed preoperatively on 7/16  - Ambit ball with Ropivacaine 0.2%/NaCl 0.9% 500mL, Rate 7 cc/hr each  - Ambit medication will not interfere with pain medication prescribed by the primary team.   - Please be aware of local anesthetic toxic dose and absorption variability before considering lidocaine patches  - Acute pain service will follow while catheters in place  - Rest of pain management per primary team    Acute Pain Resident  pg [phone] ph [phone]

## 2024-07-16 NOTE — H&P
ORTHOPAEDIC H&P     Subjective   History Of Present Illness  Renee Lombardo is a 73 y.o. female 73F (COPD on home O2, DANG, CHF, HTN) direct admit from [facility] p/w above after mGLF. Closed, NVI. CT w large posterior mal, small anterior cortical defect. SLS.    Orthopaedic Problems/Injuries:  L ankle fx    Location: Painful at L ankle  Duration: Pain has been persistent since fall  Severity: 4 /10  Worsened by movement/Palpation, improved with rest and pain medication  Open/Closed: closed, NVI: yes  Associated symptoms: no associated numbness/tingling/weakness    Other Injuries: none  NPO: yes    Past medical history: per HPI; no history of blood clots  Past surgical history: per HPI, rest reviewed in EMR  Allergies: ceftriaxone  Medications: per EMR  Social History: yes smoking, denies IVDU  Family History:  Non-contributory to this patient's acute surgical issue.    Review of Systems: 12 point ROS negative unless stated in HPI    Past Medical History  She has no past medical history on file.    Surgical History  She has a past surgical history that includes Other surgical history (03/23/2015).     Social History  She reports that she has never smoked. She has never used smokeless tobacco. No history on file for alcohol use and drug use.    Family History  No family history on file.     Allergies  Ceftriaxone, Iodinated contrast media, and Sulfamide    Review of Systems  12 point ROS negative unless stated in HPI          Objective   Physical Exam  General: Lying comfortably in bed, no acute distress  HEENT: EOMI, NC/AT  CV: RRR by peripheral pulses  Resp: Normal WOB  MSK: See below. Gross motor in tact.  Neurologic: AOx3  Skin: No rash  Psych: Mood appropriate  LLE  -Skin in tact, no open wounds  -TTP at L ankle  -Motor intact in DF/PF/EHL/FHL, SILT in saph/sural/SPN/DPN/tibial distributions  -Foot wwp, brisk cap refill, 2+ DP pulse  -Compartments soft and compressible, no pain with passive dorsiflexion     Last  "Recorded Vitals  Blood pressure 134/81, pulse 78, temperature 36.3 °C (97.3 °F), temperature source Temporal, resp. rate 20, height 1.448 m (4' 9.01\"), weight 65.8 kg (145 lb), SpO2 97%.    Relevant Results  Results for orders placed or performed during the hospital encounter of 07/15/24 (from the past 24 hour(s))   CBC and Auto Differential   Result Value Ref Range    WBC 5.2 4.4 - 11.3 x10*3/uL    nRBC 0.0 0.0 - 0.0 /100 WBCs    RBC 3.74 (L) 4.00 - 5.20 x10*6/uL    Hemoglobin 9.0 (L) 12.0 - 16.0 g/dL    Hematocrit 28.2 (L) 36.0 - 46.0 %    MCV 75 (L) 80 - 100 fL    MCH 24.1 (L) 26.0 - 34.0 pg    MCHC 31.9 (L) 32.0 - 36.0 g/dL    RDW 15.5 (H) 11.5 - 14.5 %    Platelets 228 150 - 450 x10*3/uL    Neutrophils % 59.1 40.0 - 80.0 %    Immature Granulocytes %, Automated 0.2 0.0 - 0.9 %    Lymphocytes % 28.0 13.0 - 44.0 %    Monocytes % 7.6 2.0 - 10.0 %    Eosinophils % 4.7 0.0 - 6.0 %    Basophils % 0.4 0.0 - 2.0 %    Neutrophils Absolute 3.05 1.60 - 5.50 x10*3/uL    Immature Granulocytes Absolute, Automated 0.01 0.00 - 0.50 x10*3/uL    Lymphocytes Absolute 1.44 0.80 - 3.00 x10*3/uL    Monocytes Absolute 0.39 0.05 - 0.80 x10*3/uL    Eosinophils Absolute 0.24 0.00 - 0.40 x10*3/uL    Basophils Absolute 0.02 0.00 - 0.10 x10*3/uL   Coagulation Screen   Result Value Ref Range    Protime 12.2 9.8 - 12.8 seconds    INR 1.1 0.9 - 1.1    aPTT 27 27 - 38 seconds   Type And Screen   Result Value Ref Range    ABO TYPE O     Rh TYPE POS     ANTIBODY SCREEN NEG        Images:  XR/CT w large posterior mal, small anterior cortical defect. Possible SAD type ankle fx.          Assessment:  73F (COPD on home O2, DANG, CHF, HTN) [facility] above after mGLF. Closed, NVI. CT w large posterior mal, small anterior cortical defect. SLS.     Plan:   - Admit to orthopaedic surgery  - Consented and added to OR schedule for ORIF L ankle with orthopedic surgery on 7/16  - Will require matt-op medicine clearance given comorbidities  - " NPO at midnight for upcoming procedure.  - Preop labs/orders are complete: EKG, CXR, CBC, BMP, Coags, T+S  - Strict Bedrest, NWB LLE in SLS  - Pre-operative ABx: None indicated   - No indication for pre-operative transfusion, 2U pRBC on hold for OR  - Tylenol, oxycodone, dilaudid for pain control  - Holding LR d/t hx CHF  - SCDs, will hold AM dose of DVT ppx the morning of surgery  - Maintain all tubes/lines/drains  - Continue home meds: synthroid, COPD meds  - Medicine consult for pre op optimization, chronic disease management      Consult seen and staffed within 30 minutes of notification    Plan discussed with attending, Dr Mann.    Melva Brennan MD, PGY-2  Orthopaedic Surgery  On-call: [phone]  Epic Chat Preferred    This patient will be followed by the Orthopaedic Trauma service. Please page or Epic Chat the corresponding residents below with questions or concerns.     Ortho Trauma Service (Epic Chat Preferred)  First call: Fortunato Goss MD PGY1  First call: Félix Lazo MD PGY1  Second call: Renzo Samuels MD PGY2  Third call: Donte Zaragoza PGY3    6pm-6am M-F, Holidays, and weekends page Ortho on-call [phone] with urgent questions/concerns.

## 2024-07-16 NOTE — NURSING NOTE
Carol lay rn: Dong Francois, the lab was resent, I have her home medications in. she is on albuterol inhaler 90 MCG, ipratropium bromide and albuterol sulfate inhalation 0.5mg/3MI and trelegy 200 mcg/62.5mcg/25 mcg I was not able to add these to her med list. can you place an order for pain. thank you     Melva wong MD: we dont have a lot of those specifically the medicine docs gave me recs for what to give her  also no IV fluids please

## 2024-07-16 NOTE — CONSULTS
"Reason For Consult  Preoperative Evaluation for L Ankle ORIF    History Of Present Illness  Renee Lombardo is a 73 y.o. female with a history of COPD, DANG on nightly BiPAP, HFpEF, HTN, DVT, GERD, hypothyroidism presenting with L ankle fracture. Perioperative medicine consulted for preoperative evaluation for L ankle ORIF.    Patient states that she tripped over wires on Saturday and fell resulting in a L ankle fracture. She was originally planned to have surgery at [facility] but was later transferred to [facility] due to the complexity of the injury. At this time she reports her pain is well controlled and denies any worsening swelling, loss of sensation, or paresthesias in her LLE.    Patient has a long history of COPD and follows with Dr. Francois. She uses Trelegy and albuterol inhalers daily and a ipatropium nebulizer as needed. She also takes Singulair. She states she only needs small amounts of O2 (2L) as needed during exacerbations and with vigorous exertion but typically saturates well without any oxygen. She states her last COPD exacerbation was in 3/2024 but she was not hospitalized at that time. She used her home oxygen and home inhalers and was able to manage her symptoms. She has not been compliant with her prescribed prednisone therapy as she doesn't want to raise her blood sugar. She also has DANG and uses her BiPAP machine 2-3 days per week. She sleeps with two pillows at night. She reports that her HFpEF is typically well compensated. She is typically compliant with her every other day 40mg lasix treatment but occasionally becomes \"bloated.\" She states that she feels euvolemic at this time.    Patient states she had an unprovoked DVT in her LLE back in [id number] and was treated with AC. She apparently had an echo at that time showing some RV dilation concerning for a PE but she denies any prior PE diagnosis.    She states she is able to perform ADLs and walk up flights of stairs in her home and walk " multiple blocks without sob or chest pain.     Past Medical History  She has a past medical history of COPD (chronic obstructive pulmonary disease) (Multi), Disease of thyroid gland, GERD (gastroesophageal reflux disease), and Hypothyroidism.    She has no past medical history of Parathyroid disease (Multi).    Surgical History  She has a past surgical history that includes Other surgical history (03/23/2015).     Social History  She reports that she has never smoked. She has never used smokeless tobacco. She reports current alcohol use. She reports that she does not use drugs.    Family History  No family history on file.     Allergies  Ceftriaxone, Iodinated contrast media, and Sulfamide    Review of Systems  Review of Systems   Constitutional:  Positive for activity change. Negative for chills, diaphoresis, fatigue and fever.   Respiratory:  Negative for chest tightness, shortness of breath and wheezing.    Cardiovascular:  Negative for chest pain, palpitations and leg swelling.   Gastrointestinal:  Negative for abdominal pain.   Genitourinary:  Negative for difficulty urinating.   Neurological:  Negative for dizziness, light-headedness and headaches.         Physical Exam  PHYSICAL EXAM  Vitals signs reviewed  Constitutional:       General: Not in acute distress     Appearance: Normal appearance. Not ill-appearing.  HENT:     Head: Normocephalic and atraumatic  Eyes:     Conjunctiva/sclera: Conjunctivae normal  Cardiovascular:     Rate and Rhythm: Normal rate and regular rhythm, no murmurs, rubs, gallops  Extremities: extremities warm, well-perfused, pulses palpable, trace LE edema  Pulmonary:     Effort: No respiratory distress, patient breathing comfortably on 2L NC, lungs clear to auscultation bl, no wheezing present  Abdominal:     Palpations: Abdomen is soft, non-tender, non-distended  Musculoskeletal: NARAYANAN  Skin:     General: Skin is warm and dry  Neurological:     General: No focal deficit  "present  Psychiatric:         Mood and Affect: Mood normal         Behavior: Behavior normal      Last Recorded Vitals  Blood pressure 127/65, pulse 81, temperature 37.4 °C (99.3 °F), temperature source Temporal, resp. rate 18, height 1.448 m (4' 9.01\"), weight 65.8 kg (145 lb), SpO2 94%.    Relevant Results  Lab Results   Component Value Date    WBC 5.2 07/16/2024    HGB 9.0 (L) 07/16/2024    HCT 28.2 (L) 07/16/2024    MCV 75 (L) 07/16/2024     07/16/2024      Lab Results   Component Value Date    GLUCOSE 100 (H) 07/16/2024    CALCIUM 8.3 (L) 07/16/2024     07/16/2024    K 3.4 (L) 07/16/2024    CO2 23 07/16/2024     07/16/2024    BUN 12 07/16/2024    CREATININE 0.57 07/16/2024      Results for orders placed during the hospital encounter of 07/15/24    Transthoracic Echo (TTE) Complete    Narrative  [facility], [address] [place] [address]  Tel [phone] and Fax [phone]    TRANSTHORACIC ECHOCARDIOGRAM REPORT      Patient Name:      ANAYA ZAPATALUZMARIA Knight Physician:    [id number] Kimberly Guzman MD  Study Date:        7/16/2024            Ordering Provider:    [id number] JUAN SUAREZ  MRN/PID:           [id number]             Fellow:  Accession#:        [id number]         Nurse:                Esther Burks RN  Date of Birth/Age: 1950 / 73      Sonographer:          Jen Guzman RDCS  years  Gender:            F                    Additional Staff:  Height:            144.78 cm            Admit Date:           7/15/2024  Weight:            65.77 kg             Admission Status:     Inpatient - STAT  BSA / BMI:         1.57 m2 / 31.38      Encounter#:           [id number]  kg/m2  Blood Pressure:    134/81 mmHg          Department Location:  [facility] Non  Invasive    Study Type:    TRANSTHORACIC ECHO (TTE) COMPLETE  Diagnosis/ICD: Unspecified diastolic (congestive) heart failure (CHF)-I50.30  Indication:    Congestive Heart Failure  CPT Code:      Echo " Complete w Full Doppler-45130    Patient History:  Pertinent History: COPD, DANG, CHF, HTN.    Study Detail: The following Echo studies were performed: 2D, M-Mode, Doppler and  color flow. Technically challenging study due to body habitus,  patient lying in supine position and prominent lung artifact.  Definity used as a contrast agent for endocardial border  definition. Total contrast used for this procedure was 2.0 mL via  IV push.      PHYSICIAN INTERPRETATION:  Left Ventricle: Left ventricular ejection fraction is normal, by visual estimate at 60-65%. There are no regional wall motion abnormalities. The left ventricular cavity size is normal. Spectral Doppler shows a normal pattern of left ventricular diastolic filling.  Left Atrium: The left atrium is mildly dilated.  Right Ventricle: The right ventricle is normal in size. There is normal right ventricular global systolic function.  Right Atrium: The right atrium is normal in size.  Aortic Valve: The aortic valve is probably trileaflet. There is no evidence of aortic valve regurgitation. The peak instantaneous gradient of the aortic valve is 6.2 mmHg.  Mitral Valve: The mitral valve is normal in structure. There is trace mitral valve regurgitation.  Tricuspid Valve: The tricuspid valve was not well visualized. There is trace tricuspid regurgitation. The right ventricular systolic pressure is unable to be estimated.  Pulmonic Valve: The pulmonic valve is not well visualized. The pulmonic valve regurgitation was not well visualized.  Pericardium: There is a trivial pericardial effusion.  Aorta: The aortic root is normal.  Systemic Veins: The inferior vena cava appears dilated. There is IVC inspiratory collapse greater than 50%.  In comparison to the previous echocardiogram(s): Compared with the prior exam from 5/1/2015, the LV was hyperdynamic at that time however there was RV dilatation with significantly reduced RV function wtih a McConnel's sign suggestrive of  PE at that time. The RV appears to have nornalized funcito and is now nornal in size and there is still normal, but not hyperdynamic LV systolic function. Insufficient TR today to estimate RVSP.      CONCLUSIONS:  1. Poorly visualized anatomical structures due to suboptimal image quality.  2. Left ventricular ejection fraction is normal, by visual estimate at 60-65%.  3. There is normal right ventricular global systolic function.  4. The left atrium is mildly dilated.  5. Compared with the prior exam from 2015, the LV was hyperdynamic at that time however there was RV dilatation with significantly reduced RV function wtih a McConnel's sign suggestrive of PE at that time. The RV appears to have nornalized funcito and is now nornal in size and there is still normal, but not hyperdynamic LV systolic function. Insufficient TR today to estimate RVSP.    QUANTITATIVE DATA SUMMARY:  LA VOLUME:  Normal Ranges:  LA Vol A4C:        54.1 ml   (22+/-6mL/m2)  LA Vol Index A4C:  34.5ml/m2  LA Area A4C:       18.2 cm2  LA Major Axis A4C: 5.2 cm    AORTA MEASUREMENTS:  Normal Ranges:  Ao Sinus, d: 3.10 cm (2.1-3.5cm)    LV SYSTOLIC FUNCTION BY 2D PLANIMETRY (MOD):  Normal Ranges:  EF-A4C View:    73 % (>=55%)  EF-A2C View:    72 %  EF-Biplane:     74 %  EF-Visual:      63 %  LV EF Reported: 63 %    LV DIASTOLIC FUNCTION:  Normal Ranges:  MV Peak E:    0.58 m/s    (0.7-1.2 m/s)  MV Peak A:    0.62 m/s    (0.42-0.7 m/s)  E/A Ratio:    0.93        (1.0-2.2)  MV e'         0.087 m/s   (>8.0)  MV lateral e' 0.11 m/s  MV medial e'  0.06 m/s  MV A Dur:     111.00 msec  E/e' Ratio:   6.59        (<8.0)  a'            0.14 m/s  MV DT:        185 msec    (150-240 msec)    MITRAL VALVE:  Normal Ranges:  MV DT: 185 msec (150-240msec)    AORTIC VALVE:  Normal Ranges:  AoV Vmax:      1.25 m/s (<=1.7m/s)  AoV Peak P.2 mmHg (<20mmHg)  LVOT Max Marquis:  1.04 m/s (<=1.1m/s)  LVOT VTI:      22.80 cm  LVOT Diameter: 2.00 cm  (1.8-2.4cm)  AoV  Area,Vmax: 2.61 cm2 (2.5-4.5cm2)      RIGHT VENTRICLE:  TAPSE: 22.3 mm  RV s'  0.13 m/s    TRICUSPID VALVE/RVSP:  Normal Ranges:  IVC Diam: 2.40 cm      [id number] Kimberly Guzman MD  Electronically signed on 7/16/2024 at 8:54:54 AM        ** Final **     CXR (7/16):  IMPRESSION:  1.  Atelectasis within the right mid lung and bilateral lung bases.  No consolidation, pleural effusion or pneumothorax.    CT Chest (9/14/2023):  Impression   1.  Significant area of atelectasis, and collapse in the right lower  lobe, right lower lobe bronchi are narrowed and collapse, recommend  bronchoscopy for further evaluation to exclude obstructive pathology.     Assessment/Plan   Renee Lombardo is a 73 y.o. female with a history of COPD, DANG on nightly BiPAP, HFpEF, HTN, DVT, GERD, hypothyroidism presenting with L ankle fracture. Perioperative medicine consulted for preoperative evaluation for L ankle ORIF.    Preoperative Pulmonary Risk Stratification:  Pt has a history of COPD requiring 2-3L O2 during exacerbations and vigorous exertion, DANG on nightly BiPAP. Her CXR and previous CT chest concerning for significant RLL atelectasis. Patient does not currently appear to have symptoms of a COPD exacerbation and has no concerning findings on pulmonary physical exam. She states she has >4 METs of functional capacity and rarely requires supplemental O2 during her daily routines. She is compliant with her home inhalers for COPD.    - ARISCAT score: 30 - intermediate 13.3% risk of postoperative pulmonary complications  - No further optimizations required at this time prior to surgery    Preoperative Cardiac Risk Stratification:  73 y.o. female with planned surgery as above.    Known risk factors for perioperative complications: Anemia  Congestive heart failure  Chronic pulmonary disease    Difficulty with intubation is not anticipated.    Cardiac Risk Estimation:     Surgery    Timing- Time sensitive    On my evaluation pt does not have any  evidence of ACS/Acute CHF/Fatal arrhythmias/ Severe valvular disease  Most recent EKG reviewed and it shows NSR without any signs of active ischemia or pathologic Q waves.  Most recent Echo report reviewed and it shows normal Biventricular function with EF 60-65%, no major valvular pathology.    RCRI risk score is 1 (Hx of CHF)  Based on [facility] activity Status Index the Functional Capacity is > 4METS    Based on above info pt is felt to be at elevated but acceptable risk to proceed for surgery.    Lorne Gleason MD  PGY3 Anesthesiology

## 2024-07-16 NOTE — ANESTHESIA PROCEDURE NOTES
Peripheral Block    Patient location during procedure: pre-op  Start time: 7/16/2024 11:42 AM  End time: 7/16/2024 12:10 PM  Reason for block: at surgeon's request and post-op pain management  Staffing  Performed: resident   Authorized by: Kalpesh Soria MD    Performed by: Daisy Vizcarra DO  Preanesthetic Checklist  Completed: patient identified, IV checked, site marked, risks and benefits discussed, surgical consent, monitors and equipment checked, pre-op evaluation and timeout performed   Timeout performed at: 7/16/2024 11:38 AM  Peripheral Block  Patient position: laying flat  Prep: ChloraPrep  Patient monitoring: heart rate and continuous pulse ox  Block type: popliteal and adductor canal  Laterality: left  Injection technique: catheter  Guidance: ultrasound guided  Local infiltration: ropivacaine  Needle  Needle type: Tuohy   Needle gauge: 18 G  Needle length: 8 cm  Needle localization: ultrasound guidance     image stored in chart  Catheter type: multiorifice  Catheter size: 22 G  Assessment  Injection assessment: negative aspiration for heme, no paresthesia on injection, incremental injection and local visualized surrounding nerve on ultrasound  Additional Notes  Popliteal and saphenous nerve block: Informed consent obtained.  Risks, benefits, and alternatives discussed.  ASA monitors placed and timeout performed.  Patient positioned, prepped with chlorhexidine, and draped with sterile towels.  Ultrasound guidance was used to visualize the tibia and common peroneal nerves at the popliteal fossa and surrounding structures with visualization of the needle throughout duration of the procedure.  Aspiration was negative.  15 cc of 0.5% ropivacaine, dexamethasone 4 mg, and 1:200,000 epinephrine injected.      Ultrasound guidance was used to visualize the saphenous nerve at the adductor canal and surrounding structures with visualization of the needle throughout duration of the procedure.  Aspiration was  negative.  15 cc of 0.5% ropivacaine, dexamethasone 4 mg, and 1:200,000 epinephrine injected.  Patient tolerated the procedure well.    Timeout by BARB Medrano [id number]

## 2024-07-16 NOTE — H&P
H&P reviewed. The patient was examined and there are no changes to the H&P. Patient electing to proceed with surgery. Patient consented and posted.    Melva Brennan MD, PGY-2  Orthopaedic Surgery  On-call: [phone]  Epic Chat Preferred

## 2024-07-16 NOTE — HOSPITAL COURSE
73 year-old female who presented with left trimalleolar ankle fracture. Patient is now s/p external fixation on 7/16 by Dr. Mann. Patient then returned to OR for removal of external fixation, L hindfoot fusion nail on 7/22 by Dr. Vasquez. On the day of surgery, patient was identified in the pre-operative holding area and agreeable to proceed with surgery. Written consent was obtained.  Please see operative note for further details of this procedure. Patient received 24 hours of matt-operative antibiotics. Patient recovered in the PACU before transfer to a regular nursing floor. Patient was started on multimodal pain control and lovenox for DVT prophylaxis. Physical therapy recommended continued recovery at skilled nursing facility with continued physical therapy and wound care. On the day of discharge, patient was afebrile with stable vital signs. Patient was neurovascularly intact at time of discharge. Patient was discharged with prescription of ASA 81 mg bid for DVT prophylaxis for 4 weeks. Patient will follow-up with Dr. Vasquez in 3 weeks for post-operative visit.

## 2024-07-16 NOTE — ANESTHESIA PREPROCEDURE EVALUATION
Patient: Renee Lombardo    Procedure Information       Anesthesia Start Date/Time: 07/16/24 1228    Procedure: Open Reduction Internal Fixation Ankle (Left: Ankle)    Location: [facility] OR 01 / [facility] OR    Surgeons: Renzo Mann MD            Relevant Problems   Anesthesia (within normal limits)       Clinical information reviewed:   Tobacco  Allergies  Meds   Med Hx  Surg Hx   Fam Hx  Soc Hx        NPO/Void Status  Carbohydrate Drink Given Prior to Surgery? : N  Date of Last Liquid: 07/15/24  Time of Last Liquid: 2359  Date of Last Solid: 07/15/24  Time of Last Solid: 2359  Last Intake Type: Light meal  Time of Last Void: 0800           Past Medical History:   Diagnosis Date    COPD (chronic obstructive pulmonary disease) (Multi)     Disease of thyroid gland     GERD (gastroesophageal reflux disease)     Hypothyroidism       Past Surgical History:   Procedure Laterality Date    OTHER SURGICAL HISTORY  03/23/2015    Percutaneous Vertebral Augmentation Kyphoplasty     Social History     Tobacco Use    Smoking status: Never    Smokeless tobacco: Never   Substance Use Topics    Alcohol use: Yes     Comment: SOCIALLY    Drug use: Never      Current Outpatient Medications   Medication Instructions    acetaminophen (TYLENOL) 1,000 mg, oral, Every 6 hours PRN    albuterol 90 mcg/actuation inhaler 2 puffs, inhalation, Every 4 hours PRN    alum-mag hydroxide-simeth (Maalox MAX) 400-400-40 mg/5 mL suspension 5 mL, oral, As needed    docusate sodium (COLACE) 100 mg, oral, 2 times daily    furosemide (Lasix) 40 mg tablet Take 1 tablet (40 mg) by mouth once daily on Sunday, Tuesday, Thursday and Saturday    ipratropium-albuteroL (Duo-Neb) 0.5-2.5 mg/3 mL nebulizer solution 3 mL, nebulization, Every 4 to 6 hours as needed    levothyroxine (SYNTHROID, LEVOXYL) 100 mcg, oral, Daily, Take on an empty stomach at the same time each day, either 30 to 60 minutes prior to breakfast    potassium chloride  "(Klor-Con) 20 mEq packet Take 1 packet (20 mEq) by mouth twice daily on Sunday, Tuesday, Thursday and Saturday    sodium chloride (Ocean Nasal) 0.65 % nasal spray 2 sprays, Each Nostril, As needed    sucralfate (CARAFATE) 1 g, oral, 2 times daily    Trelegy Ellipta 200-62.5-25 mcg blister with device 1 puff, inhalation, Daily      Allergies   Allergen Reactions    Ceftriaxone Unknown    Iodinated Contrast Media Unknown    Sulfamide Unknown     SULFA DRUGS        Chemistry    Lab Results   Component Value Date/Time     07/16/2024 0217    K 3.4 (L) 07/16/2024 0217     07/16/2024 0217    CO2 23 07/16/2024 0217    BUN 12 07/16/2024 0217    CREATININE 0.57 07/16/2024 0217    Lab Results   Component Value Date/Time    CALCIUM 8.3 (L) 07/16/2024 0217    ALKPHOS 70 11/06/2021 1859    AST 18 11/06/2021 1859    ALT 18 11/06/2021 1859    BILITOT 1.0 11/06/2021 1859          Lab Results   Component Value Date/Time    WBC 5.2 07/16/2024 0217    HGB 9.0 (L) 07/16/2024 0217    HCT 28.2 (L) 07/16/2024 0217     07/16/2024 0217     Lab Results   Component Value Date/Time    PROTIME 12.2 07/16/2024 0217    INR 1.1 07/16/2024 0217     No results found for this or any previous visit (from the past 4464 hour(s)).  No results found for this or any previous visit from the past 1095 days.       Visit Vitals  /65   Pulse 81   Temp 37.4 °C (99.3 °F) (Temporal)   Resp 18   Ht 1.448 m (4' 9.01\")   Wt 65.8 kg (145 lb)   SpO2 94%   BMI 31.37 kg/m²   Smoking Status Never   BSA 1.63 m²        Anesthesia Evaluation      Airway   Mallampati: II  TM distance: >3 FB  Neck ROM: full  Dental - normal exam   (+) upper dentures    Pulmonary - normal exam   Cardiovascular - normal exam    Neuro/Psych      GI/Hepatic/Renal      Endo/Other    Abdominal  - normal exam                      Physical Exam    Airway  Mallampati: II  TM distance: >3 FB  Neck ROM: full     Cardiovascular - normal exam     Dental - normal exam  (+) upper " dentures     Pulmonary - normal exam     Abdominal - normal exam              Anesthesia Plan    History of general anesthesia?: yes  History of complications of general anesthesia?: no    ASA 3     general     intravenous induction   Postoperative administration of opioids is intended.  Trial extubation is planned.  Anesthetic plan and risks discussed with patient.    Plan discussed with CAA.

## 2024-07-16 NOTE — PROGRESS NOTES
Pharmacy Medication History Review    Renee Lombardo is a 73 y.o. female admitted for Closed left ankle fracture, initial encounter. Pharmacy reviewed the patient's [id number] medications and allergies for accuracy.    The list below reflects the updated PTA list. Comments regarding how patient may be taking medications differently can be found in the Admit Orders Activity  Prior to Admission Medications   Prescriptions Last Dose Informant Patient Reported?   Trelegy Ellipta 200-62.5-25 mcg blister with device  Self Yes   Sig: Inhale 1 puff once daily.   acetaminophen (Tylenol) 500 mg tablet Unknown Self Yes   Sig: Take 2 tablets (1,000 mg) by mouth every 6 hours if needed for mild pain (1 - 3) or moderate pain (4 - 6).   albuterol 90 mcg/actuation inhaler  Self Yes   Sig: Inhale 2 puffs every 4 hours if needed for wheezing or shortness of breath.   alum-mag hydroxide-simeth (Maalox MAX) 400-400-40 mg/5 mL suspension Unknown Self Yes   Sig: Take 5 mL by mouth if needed for indigestion or heartburn.   docusate sodium (Colace) 100 mg capsule Unknown Self Yes   Sig: Take 1 capsule (100 mg) by mouth 2 times a day.   furosemide (Lasix) 40 mg tablet Unknown Self Yes   Sig: Take 1 tablet (40 mg) by mouth once daily on Sunday, Tuesday, Thursday and Saturday   ipratropium-albuteroL (Duo-Neb) 0.5-2.5 mg/3 mL nebulizer solution  Self Yes   Sig: Take 3 mL by nebulization. Every 4 to 6 hours as needed   levothyroxine (Synthroid, Levoxyl) 100 mcg tablet Unknown Self Yes   Sig: Take 1 tablet (100 mcg) by mouth early in the morning.. Take on an empty stomach at the same time each day, either 30 to 60 minutes prior to breakfast   potassium chloride (Klor-Con) 20 mEq packet Unknown Self Yes   Sig: Take 1 packet (20 mEq) by mouth twice daily on Sunday, Tuesday, Thursday and Saturday   sodium chloride (Ocean Nasal) 0.65 % nasal spray  Self Yes   Sig: Administer 2 sprays into each nostril if needed (congestion or nasal dryness).    sucralfate (Carafate) 1 gram tablet Unknown Self Yes   Sig: Take 1 tablet (1 g) by mouth 2 times a day.      Facility-Administered Medications: None        The list below reflects the updated allergy list. Please review each documented allergy for additional clarification and justification.  Allergies  Reviewed by Carol Soriano RN on 7/16/2024        Severity Reactions Comments    Ceftriaxone Not Specified Unknown     Iodinated Contrast Media Not Specified Unknown     Sulfamide Not Specified Unknown SULFA DRUGS            Patient declines M2B at discharge. Pharmacy has been [facility] to [facility].    Sources used to confirm home medication list include: Patient interview, patient had a list of her medications, OARRS, Care Everywhere, medication fill history.    Medications added: Albuterol HFA Inhaler, Ipratropium/Albuterol Nebulizer Solution, Sodium Chloride Nasal Spray    Medications modified: Acetaminophen, Alprazolam, Furosemide, Potassium Chloride    Medications to be removed: None    Below are additional concerns with the patient's PTA list.  None to note    Krystina Johnson [facility]   Transitions of Care Pharmacist  [facility]  Please reach out via Secure Chat for questions, or if no response call [phone] or [facility]

## 2024-07-16 NOTE — OP NOTE
ORTHOPAEDIC SURGERY OPERATIVE REPORT      Date of Surgery: 7/16/2024    Surgeon: Renzo Mann MD    Assistant Surgeon: MD Dr. Juan Rebolledo participated in this case as the assistant surgeon, performing components of the positioning, approach, reduction, and fixation. Due to the nature and complexity of the case, no qualified resident of an appropriate level was available to assist.    Assistants:  Renzo Samuels, PGY2  Jamaal Ramos DPM    Preoperative Diagnosis:  Left ankle pilon fracture  Left lateral malleolus fracture    Postoperative Diagnosis:  Left ankle pilon fracture  Left lateral malleolus fracture    Procedures Performed:  Left ankle spanning multiplanar external fixator application    Anesthesia: General anesthesia    IV Fluids: Per anesthesia record    Estimated Blood Loss:  5 mL    Complications: None    Implants:   Naz Gee 3 large external fixator system with associated pins, bars, and clamps    Specimens: None    Intraoperative Findings: Stable fracture fixation and safe extra-articular hardware placement noted at the conclusion of the case.    Indications For Procedure:  Renee Lombardo is a 73 y.o. female sustained a mechanical ground-level fall.  The patient initially presented to an outside facility where imaging obtained demonstrated a left ankle injury.  Advanced imaging demonstrated a very large posterior malleolus fracture which is essentially a pilon variant as well as a lateral malleolus fracture.  Given the patient's findings as well as medical comorbidities, she was transferred to [facility] for higher level of care and definitive management.  Given the nature of the patient's fracture as well as the patient's severely poor bone quality, discussion was had with the patient about attempted ORIF versus acute ankle fusion.  We discussed that regardless of the treatment modality, is reasonable to proceed with temporizing external fixation to allow for soft tissue  rest.  Additionally, this would allow for repeat CT scan to better characterize the fracture and perform preoperative planning and determine the best course of treatment.  Patient was understanding of this and agreeable. Due to the nature of the patient's injury, they were indicated for operative stabilization.  Informed consent was obtained after discussing the risks, benefits, and alternatives to the procedure.  Risks include pain, bleeding, infection, damage to nearby structures, malunion, nonunion, hardware complications, need for further procedures, blood clots, anesthesia risks, and death.  Decision was made to proceed.  The patient was then brought to the preoperative holding area on the day of surgery.    Procedure Detail:  The patient was met in the preoperative holding area where their identity was confirmed and the operative extremity was marked. The patient was taken back to the operating theater where a preinduction timeout was performed which confirmed the patient's identity, procedure to be performed, correct operative site, availability of imaging and implants, allergies, and preoperative antibiotics. Everyone present was in agreement. They were placed under general anesthesia without complication. The patient was transferred to the operating table and placed in the supine position. All bony prominences were well-padded. The patient's left lower extremity was then prepped and draped in the usual sterile fashion. A preprocedure timeout was performed which again confirmed the patient's identity, procedure to be performed, and correct operative site.  Everyone present was in agreement. Preoperative antibiotics were administered.    We began by placing 2 percutaneous bicortical 5.0 mm pins within the tibial shaft.  Following this, we placed the calcaneal transfixion pin without difficulty.  Once these were placed, bars and clamps were attached to these.  Fluoroscopy obtained at this time demonstrated  satisfactory reduction in the AP view.  The lateral view demonstrated that there was still some subluxation of the tibiotalar joint.  We were then able to fine-tune this once this was appropriate secured down the bars and clamps.  Following this, I placed a bicortical pin within the first metatarsal and connected this to the remainder of the construct.  We obtained our final fluoroscopic images which demonstrate maintained satisfactory fracture duction as well as safe hardware placement.  The pin sites were dressed with Xeroform and Kerlix.  All counts were correct x 2 at this time.    The drapes were taken down and the patient was awoken from anesthesia without complication. They were transferred back to their hospital bed and taken to PACU in stable condition.    Postoperative Plan:  The patient be nonweightbearing to the left lower extremity at this time.  She will receive postoperative antibiotics.  We will plan to start the patient on short acting DVT prophylaxis in the form of Lovenox given her planned return to the operating room in the upcoming days.  We will obtain a post external fixator CT scan of the left ankle for preoperative planning for the patient's upcoming surgery.  We will plan for the patient to undergo ORIF versus hindfoot fusion nail with Dr. Vasquez in the upcoming days once her soft tissues are amenable.      Dr. Renzo Mann MD was present for the critical portions of the case and was otherwise immediately available to assist.      Lawrecne Martinez MD  Orthopaedic Trauma Fellow  7/16/2024

## 2024-07-16 NOTE — ANESTHESIA PROCEDURE NOTES
Airway  Date/Time: 7/16/2024 12:39 PM  Urgency: elective    Airway not difficult    Staffing  Performed: ALFONZO   Authorized by: Kelvin Dos Santos DO    Performed by: FLIP Bhakta  Patient location during procedure: OR    Indications and Patient Condition  Indications for airway management: anesthesia and airway protection  Spontaneous Ventilation: absent  Sedation level: deep  Preoxygenated: yes  Patient position: sniffing  Mask difficulty assessment: 1 - vent by mask    Final Airway Details  Final airway type: endotracheal airway      Successful airway: ETT  Cuffed: yes   Successful intubation technique: direct laryngoscopy  Facilitating devices/methods: intubating stylet  Endotracheal tube insertion site: oral  Blade: Colleen  Blade size: #3  ETT size (mm): 7.0  Cormack-Lehane Classification: grade I - full view of glottis  Placement verified by: chest auscultation and capnometry   Measured from: lips  ETT to lips (cm): 22  Number of attempts at approach: 1

## 2024-07-16 NOTE — PROGRESS NOTES
Orthopaedic Surgery Progress Note    Subjective:  Resting comfortably in PACU. No acute issues. Pain controlled. Denies N/V. Denies SOB/chest pain. Denies N/T.    Objective:  Vitals:    07/16/24 1114   BP: 127/65   Pulse: 81   Resp: 18   Temp: 37.4 °C (99.3 °F)   SpO2: 94%     Physical Exam  - Constitutional: No acute distress, cooperative  - Eyes: EOM grossly intact  - Head/Neck: Trachea midline  - Respiratory/Thorax: NWOB  - Cardiovascular: RRR on peripheral palpation  - Gastrointestinal: Nondistended  - Psychological: Appropriate mood/behavior  - Skin: Warm and dry. Additional findings in musculoskeletal evaluation  - Musculoskeletal:  ---  Left lower extremity:  - Ex-fix w dressing C/D/I  - Compartments soft and compressible  - Fires TA/GS/EHL  - SILT in Miller/Sa/SP/DP/T distribution  - Palpable DP pulse      Results for orders placed or performed during the hospital encounter of 07/15/24 (from the past 24 hour(s))   CBC and Auto Differential   Result Value Ref Range    WBC 5.2 4.4 - 11.3 x10*3/uL    nRBC 0.0 0.0 - 0.0 /100 WBCs    RBC 3.74 (L) 4.00 - 5.20 x10*6/uL    Hemoglobin 9.0 (L) 12.0 - 16.0 g/dL    Hematocrit 28.2 (L) 36.0 - 46.0 %    MCV 75 (L) 80 - 100 fL    MCH 24.1 (L) 26.0 - 34.0 pg    MCHC 31.9 (L) 32.0 - 36.0 g/dL    RDW 15.5 (H) 11.5 - 14.5 %    Platelets 228 150 - 450 x10*3/uL    Neutrophils % 59.1 40.0 - 80.0 %    Immature Granulocytes %, Automated 0.2 0.0 - 0.9 %    Lymphocytes % 28.0 13.0 - 44.0 %    Monocytes % 7.6 2.0 - 10.0 %    Eosinophils % 4.7 0.0 - 6.0 %    Basophils % 0.4 0.0 - 2.0 %    Neutrophils Absolute 3.05 1.60 - 5.50 x10*3/uL    Immature Granulocytes Absolute, Automated 0.01 0.00 - 0.50 x10*3/uL    Lymphocytes Absolute 1.44 0.80 - 3.00 x10*3/uL    Monocytes Absolute 0.39 0.05 - 0.80 x10*3/uL    Eosinophils Absolute 0.24 0.00 - 0.40 x10*3/uL    Basophils Absolute 0.02 0.00 - 0.10 x10*3/uL   Basic metabolic panel   Result Value Ref Range    Glucose 100 (H) 74 - 99 mg/dL    Sodium  141 136 - 145 mmol/L    Potassium 3.4 (L) 3.5 - 5.3 mmol/L    Chloride 105 98 - 107 mmol/L    Bicarbonate 23 21 - 32 mmol/L    Anion Gap 16 10 - 20 mmol/L    Urea Nitrogen 12 6 - 23 mg/dL    Creatinine 0.57 0.50 - 1.05 mg/dL    eGFR >90 >60 mL/min/1.73m*2    Calcium 8.3 (L) 8.6 - 10.6 mg/dL   Coagulation Screen   Result Value Ref Range    Protime 12.2 9.8 - 12.8 seconds    INR 1.1 0.9 - 1.1    aPTT 27 27 - 38 seconds   Type And Screen   Result Value Ref Range    ABO TYPE O     Rh TYPE POS     ANTIBODY SCREEN NEG    Iron and TIBC   Result Value Ref Range    Iron 34 (L) 35 - 150 ug/dL    UIBC 319 110 - 370 ug/dL    TIBC 353 240 - 445 ug/dL    % Saturation 10 (L) 25 - 45 %   Ferritin   Result Value Ref Range    Ferritin 38 8 - 150 ng/mL   B-type natriuretic peptide   Result Value Ref Range     (H) 0 - 99 pg/mL   VERIFY ABO/Rh Group Test   Result Value Ref Range    ABO TYPE O     Rh TYPE POS    Transthoracic Echo (TTE) Complete   Result Value Ref Range    AV pk kaya 1.25 m/s    LVOT diam 2.00 cm    MV E/A ratio 0.93     Tricuspid annular plane systolic excursion 2.2 cm    LV EF 63 %    RV free wall pk S' 13.30 cm/s    Aortic Valve Area by Continuity of Peak Velocity 2.61 cm2    AV pk grad 6.3 mmHg    LV A4C EF 72.8    Lower extremity venous duplex bilateral   Result Value Ref Range    BSA 1.63 m2       Lower extremity venous duplex bilateral         Transthoracic Echo (TTE) Complete   Final Result      XR ankle left 3+ views         XR chest 1 view   Final Result   1.  Atelectasis within the right mid lung and bilateral lung bases.   No consolidation, pleural effusion or pneumothorax.        I personally reviewed the images/study and I agree with the findings   as stated by Marco A Lucas MD (Radiology Resident).   This study was interpreted at [facility], [place], [place].        MACRO:   None        Signed by: Alexx Schultz 7/16/2024 10:20 AM   Dictation  workstation:   [id number]      FL fluoro images no charge    (Results Pending)   CT ankle left wo IV contrast    (Results Pending)     Assessment:  73F (COPD on home O2, DANG, CHF, HTN) direct admit from [facility] p/w above after mGLF. Closed, NVI. CT w large posterior mal, small anterior cortical defect. Now s/p L ankle-spanning Ex-Fix with Dr. Mann.     Plan:  - Weight-bearing status: NWB LLE  - RTOR w Dr. Vasquez pending skin checks  - Post ex-fix CT L ankle pending  - Embolic ppx: Lovenox  - Feeding: Regular diet as tolerated, bowel regimen  - Analgesia: Multimodal  - Volume: mIV @ at  cc/hr, HLIVF when tolerating PO, monitor BMP  - OT/PT: Consulted  - Respiratory: Encourage IS, maintain O2 sat >92%  - Infection: Madai-operative Ancef for 24 hours, afebrile   - Lines: Maintain PIVx2 while inpatient  - Drains: N  - Rhodes: N  - Transfusion: Trend CBC, no indication for transfusion  - Cardiac: No issues  - Glycemic: No issues  - C/w home medications  - Dispo pending PT, pain control    D/w Dr. Mann & Dr. Vasquez    This patient will be followed by the Orthopaedic Trauma service. Please page or Epic Chat the corresponding residents below with questions or concerns.     Ortho Trauma Service (Epic Chat Preferred)  First call: Félix Lazo MD PGY-1  First call: Fortunato Goss MD PGY-1  Second call: Renzo Samuels, PGY-2  Third call: Donte Zaragoza PGY-3    6pm-6am M-F, Holidays, and weekends page Ortho on-call @[phone] with urgent questions/concerns.     Renzo Samuels MD  Orthopaedic Surgery PGY-1  On-call pager [phone]

## 2024-07-16 NOTE — ANESTHESIA POSTPROCEDURE EVALUATION
Patient: Renee Lombardo    Procedure Summary       Date: 07/16/24 Room / Location: [facility] OR 01 / [facility] OR    Anesthesia Start: 1228 Anesthesia Stop: 1335    Procedure: Open Reduction Internal Fixation Ankle (Left: Ankle) Diagnosis:       Closed left ankle fracture, initial encounter      (Closed left ankle fracture, initial encounter [S82.892A])    Surgeons: Renzo Mann MD Responsible Provider: Kelvin Dos Santos DO    Anesthesia Type: general ASA Status: 3            Anesthesia Type: No value filed.    Vitals Value Taken Time   /61 07/16/24 1326   Temp 37.3 °C (99.1 °F) 07/16/24 1326   Pulse 75 07/16/24 1326   Resp 13 07/16/24 1326   SpO2 100 07/16/24 1335       Anesthesia Post Evaluation    Patient location during evaluation: PACU  Patient participation: complete - patient participated  Level of consciousness: awake  Pain management: adequate  Airway patency: patent  Cardiovascular status: stable  Respiratory status: face mask  Hydration status: stable  Postoperative Nausea and Vomiting: none        There were no known notable events for this encounter.

## 2024-07-16 NOTE — CARE PLAN
Addended by: ROSALINDA HU on: 4/4/2024 04:20 PM     Modules accepted: Orders     The patient's goals for the shift include get sleep    The clinical goals for the shift include Pain will remain less than 5    Over the shift, the patient did not make progress toward the following goals. Barriers to progression include none. Recommendations to address these barriers include none  Problem: Fall/Injury  Goal: Not fall by end of shift  Outcome: Progressing  Goal: Be free from injury by end of the shift  Outcome: Progressing  Goal: Verbalize understanding of personal risk factors for fall in the hospital  Outcome: Progressing  Goal: Verbalize understanding of risk factor reduction measures to prevent injury from fall in the home  Outcome: Progressing  Goal: Use assistive devices by end of the shift  Outcome: Progressing  Goal: Pace activities to prevent fatigue by end of the shift  Outcome: Progressing     Problem: Pain  Goal: Takes deep breaths with improved pain control throughout the shift  Outcome: Progressing  Goal: Turns in bed with improved pain control throughout the shift  Outcome: Progressing  Goal: Walks with improved pain control throughout the shift  Outcome: Progressing  Goal: Performs ADL's with improved pain control throughout shift  Outcome: Progressing  Goal: Participates in PT with improved pain control throughout the shift  Outcome: Progressing  Goal: Free from opioid side effects throughout the shift  Outcome: Progressing  Goal: Free from acute confusion related to pain meds throughout the shift  Outcome: Progressing     Problem: Pain - Adult  Goal: Verbalizes/displays adequate comfort level or baseline comfort level  Outcome: Progressing     Problem: Safety - Adult  Goal: Free from fall injury  Outcome: Progressing     Problem: Discharge Planning  Goal: Discharge to home or other facility with appropriate resources  Outcome: Progressing     Problem: Chronic Conditions and Co-morbidities  Goal: Patient's chronic conditions and co-morbidity symptoms are monitored and maintained or  improved  Outcome: Progressing   .

## 2024-07-16 NOTE — PROGRESS NOTES
Pharmacy Admission Order Reconciliation Review    Renee Lombardo is a 73 y.o. female admitted for Closed left ankle fracture, initial encounter. Pharmacy reviewed the patient's unreconciled admission medications.    Prior to admission medications that were reviewed and acted on by the pharmacist include:  Albuterol  Duo-neb  Ocean nasal  Trelegy  These medications have been reconciled.     Any other unreconcilied medications have been addressed and will be ordered or held by the patient's medical team. Medications addressed by the pharmacist may be added or changed by the patient's medical team at any time.    Salome Stephen, PharmD  Transitions of Care Pharmacist  [facility]  Please reach out via Secure Chat for questions

## 2024-07-17 ENCOUNTER — ANESTHESIA EVENT (OUTPATIENT)
Dept: OPERATING ROOM | Facility: HOSPITAL | Age: 74
End: 2024-07-17
Payer: MEDICARE

## 2024-07-17 PROCEDURE — 2500000001 HC RX 250 WO HCPCS SELF ADMINISTERED DRUGS (ALT 637 FOR MEDICARE OP)

## 2024-07-17 PROCEDURE — 2500000002 HC RX 250 W HCPCS SELF ADMINISTERED DRUGS (ALT 637 FOR MEDICARE OP, ALT 636 FOR OP/ED)

## 2024-07-17 PROCEDURE — 94640 AIRWAY INHALATION TREATMENT: CPT

## 2024-07-17 PROCEDURE — 99231 SBSQ HOSP IP/OBS SF/LOW 25: CPT | Performed by: STUDENT IN AN ORGANIZED HEALTH CARE EDUCATION/TRAINING PROGRAM

## 2024-07-17 PROCEDURE — 1100000001 HC PRIVATE ROOM DAILY

## 2024-07-17 PROCEDURE — 2500000004 HC RX 250 GENERAL PHARMACY W/ HCPCS (ALT 636 FOR OP/ED)

## 2024-07-17 RX ORDER — ALBUTEROL SULFATE 90 UG/1
2 AEROSOL, METERED RESPIRATORY (INHALATION) EVERY 4 HOURS PRN
Status: DISCONTINUED | OUTPATIENT
Start: 2024-07-17 | End: 2024-07-22

## 2024-07-17 RX ORDER — ALBUTEROL SULFATE 0.83 MG/ML
1.25 SOLUTION RESPIRATORY (INHALATION) EVERY 6 HOURS PRN
Status: DISCONTINUED | OUTPATIENT
Start: 2024-07-17 | End: 2024-07-17

## 2024-07-17 RX ORDER — ALPRAZOLAM 0.25 MG/1
1 TABLET ORAL NIGHTLY PRN
Status: DISCONTINUED | OUTPATIENT
Start: 2024-07-16 | End: 2024-07-17

## 2024-07-17 RX ORDER — ALBUTEROL SULFATE 0.83 MG/ML
1.25 SOLUTION RESPIRATORY (INHALATION) EVERY 6 HOURS PRN
Status: DISCONTINUED | OUTPATIENT
Start: 2024-07-17 | End: 2024-07-18

## 2024-07-17 RX ORDER — FLUTICASONE FUROATE AND VILANTEROL 200; 25 UG/1; UG/1
1 POWDER RESPIRATORY (INHALATION)
Status: DISCONTINUED | OUTPATIENT
Start: 2024-07-18 | End: 2024-07-18

## 2024-07-17 RX ORDER — ALPRAZOLAM 0.25 MG/1
1 TABLET ORAL NIGHTLY PRN
Status: DISCONTINUED | OUTPATIENT
Start: 2024-07-17 | End: 2024-07-29 | Stop reason: HOSPADM

## 2024-07-17 RX ORDER — ALBUTEROL SULFATE 1.25 MG/3ML
1.25 SOLUTION RESPIRATORY (INHALATION) EVERY 4 HOURS PRN
Status: DISCONTINUED | OUTPATIENT
Start: 2024-07-17 | End: 2024-07-17

## 2024-07-17 RX ORDER — ALBUTEROL SULFATE 0.83 MG/ML
2.5 SOLUTION RESPIRATORY (INHALATION) EVERY 4 HOURS PRN
Status: DISCONTINUED | OUTPATIENT
Start: 2024-07-17 | End: 2024-07-17

## 2024-07-17 ASSESSMENT — PAIN SCALES - GENERAL
PAINLEVEL_OUTOF10: 0 - NO PAIN
PAINLEVEL_OUTOF10: 0 - NO PAIN

## 2024-07-17 ASSESSMENT — PAIN - FUNCTIONAL ASSESSMENT: PAIN_FUNCTIONAL_ASSESSMENT: 0-10

## 2024-07-17 NOTE — ANESTHESIA PREPROCEDURE EVALUATION
Patient: Renee Lombardo    Procedure Information       Date/Time: 07/19/24 0715    Procedures:       hind foot fusion nail (Left: Ankle) - hind foot fusion nail      removal of external fixator (Left: Leg Lower)    Location: [facility] OR  / [facility] OR    Surgeons: Channing Vasquez MD        ALLERGIES:  Allergies   Allergen Reactions    Ceftriaxone Unknown    Iodinated Contrast Media Unknown    Sulfamide Unknown     SULFA DRUGS        MEDICAL HISTORY:  Past Medical History:   Diagnosis Date    COPD (chronic obstructive pulmonary disease) (Multi)     Disease of thyroid gland     GERD (gastroesophageal reflux disease)     Hypothyroidism         Relevant Problems   Anesthesia (within normal limits)      Cardiac  EF on 7/16 65%   (+) CHF (congestive heart failure) (Multi)   (+) HTN (hypertension)      Pulmonary  On 2L NC at home    (+) Chronic obstructive pulmonary disease (Multi)   (+) DANG (obstructive sleep apnea)      Neuro (within normal limits)      GI   (+) Gastroesophageal reflux disease      /Renal (within normal limits)      Liver (within normal limits)      Endocrine   (+) Obesity      Hematology   (+) DVT (deep venous thrombosis) (Multi)        SURGICAL HISTORY:  Past Surgical History:   Procedure Laterality Date    OTHER SURGICAL HISTORY  03/23/2015    Percutaneous Vertebral Augmentation Kyphoplasty        MEDICATIONS:  Current Outpatient Medications   Medication Instructions    acetaminophen (TYLENOL) 1,000 mg, oral, Every 6 hours PRN    albuterol 90 mcg/actuation inhaler 2 puffs, inhalation, Every 4 hours PRN    alum-mag hydroxide-simeth (Maalox MAX) 400-400-40 mg/5 mL suspension 5 mL, oral, As needed    docusate sodium (COLACE) 100 mg, oral, 2 times daily    furosemide (Lasix) 40 mg tablet Take 1 tablet (40 mg) by mouth once daily on Sunday, Tuesday, Thursday and Saturday    ipratropium-albuteroL (Duo-Neb) 0.5-2.5 mg/3 mL nebulizer solution 3 mL, nebulization, Every 4 to 6 hours as needed     levothyroxine (SYNTHROID, LEVOXYL) 100 mcg, oral, Daily, Take on an empty stomach at the same time each day, either 30 to 60 minutes prior to breakfast    potassium chloride (Klor-Con) 20 mEq packet Take 1 packet (20 mEq) by mouth twice daily on Sunday, Tuesday, Thursday and Saturday    sodium chloride (Ocean Nasal) 0.65 % nasal spray 2 sprays, Each Nostril, As needed    sucralfate (CARAFATE) 1 g, oral, 2 times daily    Trelegy Ellipta 200-62.5-25 mcg blister with device 1 puff, inhalation, Daily        VITALS:      7/18/2024     7:58 AM 7/18/2024    12:44 AM 7/17/2024     3:53 PM   Vitals   Systolic 145 115 135   Diastolic 81 67 83   Heart Rate 70 76 75   Temp 36.2 °C (97.2 °F) 36.1 °C (97 °F) 36.8 °C (98.2 °F)   Resp 16 14 16       LABS:   BMP   Lab Results   Component Value Date    GLUCOSE 100 (H) 07/16/2024    CALCIUM 8.3 (L) 07/16/2024     07/16/2024    K 3.4 (L) 07/16/2024    CO2 23 07/16/2024     07/16/2024    BUN 12 07/16/2024    CREATININE 0.57 07/16/2024   , LFT   Lab Results   Component Value Date    ALT 18 11/06/2021    AST 18 11/06/2021    ALKPHOS 70 11/06/2021    BILITOT 1.0 11/06/2021   , CBC  Lab Results   Component Value Date    WBC 6.7 07/16/2024    HGB 10.4 (L) 07/16/2024    HCT 33.6 (L) 07/16/2024    MCV 78 (L) 07/16/2024     07/16/2024          , Coags   Lab Results   Component Value Date/Time    PROTIME 12.2 07/16/2024 0217    INR 1.1 07/16/2024 0217    APTT 27 07/16/2024 0217        IMAGES:  EKG        No results found for this or any previous visit (from the past 4464 hour(s)).   , ECHO         Transthoracic Echo (TTE) Complete With Contrast 07/16/2024    [facility], [address] [place] [address]  Tel [phone] and Fax [phone]    TRANSTHORACIC ECHOCARDIOGRAM REPORT      Patient Name:      RENEE LOMBARDO Reading Physician:    [id number] Kimberly Guzman MD  Study Date:        7/16/2024            Ordering Provider:    [id number]  JUAN SUAREZ  MRN/PID:           [id number]             Fellow:  Accession#:        [id number]         Nurse:                Esther Burks RN  Date of Birth/Age: 1950 / 73      Sonographer:          Jen Guzman RDCS  years  Gender:            F                    Additional Staff:  Height:            144.78 cm            Admit Date:           7/15/2024  Weight:            65.77 kg             Admission Status:     Inpatient - STAT  BSA / BMI:         1.57 m2 / 31.38      Encounter#:           [id number]  kg/m2  Blood Pressure:    134/81 mmHg          Department Location:  [facility] Non  Invasive    Study Type:    TRANSTHORACIC ECHO (TTE) COMPLETE  Diagnosis/ICD: Unspecified diastolic (congestive) heart failure (CHF)-I50.30  Indication:    Congestive Heart Failure  CPT Code:      Echo Complete w Full Doppler-[id number]    Patient History:  Pertinent History: COPD, DANG, CHF, HTN.    Study Detail: The following Echo studies were performed: 2D, M-Mode, Doppler and  color flow. Technically challenging study due to body habitus,  patient lying in supine position and prominent lung artifact.  Definity used as a contrast agent for endocardial border  definition. Total contrast used for this procedure was 2.0 mL via  IV push.      PHYSICIAN INTERPRETATION:  Left Ventricle: Left ventricular ejection fraction is normal, by visual estimate at 60-65%. There are no regional wall motion abnormalities. The left ventricular cavity size is normal. Spectral Doppler shows a normal pattern of left ventricular diastolic filling.  Left Atrium: The left atrium is mildly dilated.  Right Ventricle: The right ventricle is normal in size. There is normal right ventricular global systolic function.  Right Atrium: The right atrium is normal in size.  Aortic Valve: The aortic valve is probably trileaflet. There is no evidence of aortic valve regurgitation. The peak instantaneous gradient of the aortic valve is 6.2 mmHg.  Mitral Valve:  The mitral valve is normal in structure. There is trace mitral valve regurgitation.  Tricuspid Valve: The tricuspid valve was not well visualized. There is trace tricuspid regurgitation. The right ventricular systolic pressure is unable to be estimated.  Pulmonic Valve: The pulmonic valve is not well visualized. The pulmonic valve regurgitation was not well visualized.  Pericardium: There is a trivial pericardial effusion.  Aorta: The aortic root is normal.  Systemic Veins: The inferior vena cava appears dilated. There is IVC inspiratory collapse greater than 50%.  In comparison to the previous echocardiogram(s): Compared with the prior exam from 5/1/2015, the LV was hyperdynamic at that time however there was RV dilatation with significantly reduced RV function wtih a McConnel's sign suggestrive of PE at that time. The RV appears to have nornalized funcito and is now nornal in size and there is still normal, but not hyperdynamic LV systolic function. Insufficient TR today to estimate RVSP.      CONCLUSIONS:  1. Poorly visualized anatomical structures due to suboptimal image quality.  2. Left ventricular ejection fraction is normal, by visual estimate at 60-65%.  3. There is normal right ventricular global systolic function.  4. The left atrium is mildly dilated.  5. Compared with the prior exam from 5/1/2015, the LV was hyperdynamic at that time however there was RV dilatation with significantly reduced RV function wtih a McConnel's sign suggestrive of PE at that time. The RV appears to have nornalized funcito and is now nornal in size and there is still normal, but not hyperdynamic LV systolic function. Insufficient TR today to estimate RVSP.    QUANTITATIVE DATA SUMMARY:  LA VOLUME:  Normal Ranges:  LA Vol A4C:        54.1 ml   (22+/-6mL/m2)  LA Vol Index A4C:  34.5ml/m2  LA Area A4C:       18.2 cm2  LA Major Axis A4C: 5.2 cm    AORTA MEASUREMENTS:  Normal Ranges:  Ao Sinus, d: 3.10 cm (2.1-3.5cm)    LV SYSTOLIC  FUNCTION BY 2D PLANIMETRY (MOD):  Normal Ranges:  EF-A4C View:    73 % (>=55%)  EF-A2C View:    72 %  EF-Biplane:     74 %  EF-Visual:      63 %  LV EF Reported: 63 %    LV DIASTOLIC FUNCTION:  Normal Ranges:  MV Peak E:    0.58 m/s    (0.7-1.2 m/s)  MV Peak A:    0.62 m/s    (0.42-0.7 m/s)  E/A Ratio:    0.93        (1.0-2.2)  MV e'         0.087 m/s   (>8.0)  MV lateral e' 0.11 m/s  MV medial e'  0.06 m/s  MV A Dur:     111.00 msec  E/e' Ratio:   6.59        (<8.0)  a'            0.14 m/s  MV DT:        185 msec    (150-240 msec)    MITRAL VALVE:  Normal Ranges:  MV DT: 185 msec (150-240msec)    AORTIC VALVE:  Normal Ranges:  AoV Vmax:      1.25 m/s (<=1.7m/s)  AoV Peak P.2 mmHg (<20mmHg)  LVOT Max Marquis:  1.04 m/s (<=1.1m/s)  LVOT VTI:      22.80 cm  LVOT Diameter: 2.00 cm  (1.8-2.4cm)  AoV Area,Vmax: 2.61 cm2 (2.5-4.5cm2)      RIGHT VENTRICLE:  TAPSE: 22.3 mm  RV s'  0.13 m/s    TRICUSPID VALVE/RVSP:  Normal Ranges:  IVC Diam: 2.40 cm      [id number] Kimberly Guzman MD  Electronically signed on 2024 at 8:54:54 AM        ** Final **    , CARDIAC CATH      No results found for this or any previous visit from the past 730 days.   , CXR       XR chest 1 view 2024    Narrative  Interpreted By:  Alexx Schultz and Bartolomei Aguilar Christopher  STUDY:  XR CHEST 1 VIEW;  2024 12:39 am    INDICATION:  Signs/Symptoms:pre op.    COMPARISON:  Chest radiograph 2023    ACCESSION NUMBER(S):  [id number]    ORDERING CLINICIAN:  JUAN SUAREZ    FINDINGS:  AP radiograph of the chest was provided.        CARDIOMEDIASTINAL SILHOUETTE:  Cardiomediastinal silhouette is stable in size and configuration.    LUNGS:  Low lung volumes contributing to bronchovascular crowding. Linear  opacity within the right mid lung atelectasis. There is also  bibasilar atelectasis, worse in the left. No consolidation, pleural  effusion or pneumothorax.    ABDOMEN:  No remarkable upper abdominal findings.    BONES:  Multilevel  kyphoplasty changes are noted in the thoracic spine. No  acute osseous abnormality.    Impression  1.  Atelectasis within the right mid lung and bilateral lung bases.  No consolidation, pleural effusion or pneumothorax.    I personally reviewed the images/study and I agree with the findings  as stated by Marco A Lucas MD (Radiology Resident).  This study was interpreted at [facility], [place], [place].    MACRO:  None    Signed by: Alexx Schultz 7/16/2024 10:20 AM  Dictation workstation:   [id number]    , CT Head/Neck     No results found for this or any previous visit from the past 760 days.    , CT Chest        CT chest wo IV contrast 09/14/2023    Addendum 9/15/2023  9:02 AM  Interpreted By:  LOGAN MCDANIEL MD  Addendum Begins  MRN: [id number]  Patient Name: LOMBARDO, RENEE    ADDENDUM:  Decreased bone density. Multilevel decrease in the heights of  multiple thoracic vertebral bodies with kyphoplasty changes, stable  from prior.    Addendum Ends    Narrative  MRN: [id number]  Patient Name: LOMBARDO, RENEE    STUDY:  CT CHEST WO CONTRAST;  9/14/2023 10:10 am    INDICATION:  J47.9 Bronchiectasis, uncomplicated.    COMPARISON:  12/20/2018, 08/19/2015, 04/30/2015    ACCESSION NUMBER(S):  [id number]    ORDERING CLINICIAN:  EMERSON HASSAN    TECHNIQUE:  Helical data acquisition of the chest was obtained  without IV  contrast material.  Images were reformatted in axial, coronal, and  sagittal planes.    FINDINGS:  LUNGS AND AIRWAYS:  The trachea and central airways are patent. Right lower lobe bronchi  are collapsed, narrowed by the atelectatic changes in the right lower  lobe.    9 mm ground-glass opacity in the right lung apex on slice 25, 4 mm  area of nodular thickening in the left upper lobe on slice 44, areas  of subpleural thickening, scarring and atelectatic changes scattered  throughout the lungs. Calcific granuloma in the right upper lobe.  Moderate to  significant area of collapse and atelectatic changes  involving the right lower lobe.    MEDIASTINUM AND JUAN R, LOWER NECK AND AXILLA:  Both lobes of the thyroid gland are not visualized, recommend  correlation with surgical history/atrophy.    No evidence of thoracic lymphadenopathy by CT criteria.    Esophagus appears within normal limits as seen.    HEART AND VESSELS:  No aortic aneurysm or intramural hematoma.    Main pulmonary artery and its branches are normal in caliber.    Significant coronary calcifications. The study is not optimized for  evaluation of coronary arteries.    The cardiac chambers are not enlarged.    No evidence of pericardial effusion.    UPPER ABDOMEN:  Cholelithiasis.    CHEST WALL AND OSSEOUS STRUCTURES:  There are no suspicious osseous lesions. Multilevel degenerative  changes are present    Impression  1.  Significant area of atelectasis, and collapse in the right lower  lobe, right lower lobe bronchi are narrowed and collapse, recommend  bronchoscopy for further evaluation to exclude obstructive pathology.    MACRO:  None   , CT Abdomin     No results found for this or any previous visit from the past 730 days.    SOCIAL:  Social History     Tobacco Use   Smoking Status Never   Smokeless Tobacco Never      Social History     Substance and Sexual Activity   Alcohol Use Yes    Comment: SOCIALLY      Social History     Substance and Sexual Activity   Drug Use Never        NPO STATUS:  No data recorded      Clinical Areas Reviewed:   Tobacco  Allergies  Meds   Med Hx  Surg Hx   Fam Hx  Soc Hx        Anesthesia Assessment:    Physical Exam    Airway  Mallampati: III  TM distance: >3 FB  Neck ROM: full     Cardiovascular   Rhythm: regular  Rate: normal     Dental   (+) upper dentures       Pulmonary   (+) wheezes     Abdominal   (+) obese             Anesthesia Plan    History of general anesthesia?: yes  History of complications of general anesthesia?: no    ASA 3     The patient is  not a current smoker.  Patient was previously instructed to abstain from smoking on day of procedure.  Patient did not smoke on day of procedure.    intravenous induction   Postoperative administration of opioids is intended.  Trial extubation is planned.  Anesthetic plan and risks discussed with patient.  Use of blood products discussed with patient who consented to blood products.    Plan discussed with resident and attending.

## 2024-07-17 NOTE — CARE PLAN
The patient's goals for the shift include get sleep    The clinical goals for the shift include Sit in chair foir meals    Over the shift, the patient did not make progress toward the following goals. Barriers to progression include none. Recommendations to address these barriers include none  Problem: Fall/Injury  Goal: Not fall by end of shift  7/17/2024 0735 by Cara Breen RN  Outcome: Progressing  7/17/2024 0735 by Cara Breen RN  Outcome: Progressing  Goal: Be free from injury by end of the shift  7/17/2024 0735 by Cara Breen RN  Outcome: Progressing  7/17/2024 0735 by Cara Breen RN  Outcome: Progressing  Goal: Verbalize understanding of personal risk factors for fall in the hospital  7/17/2024 0735 by Cara Breen RN  Outcome: Progressing  7/17/2024 0735 by Cara Breen RN  Outcome: Progressing  Goal: Verbalize understanding of risk factor reduction measures to prevent injury from fall in the home  7/17/2024 0735 by Cara Breen RN  Outcome: Progressing  7/17/2024 0735 by Cara Breen RN  Outcome: Progressing  Goal: Use assistive devices by end of the shift  7/17/2024 0735 by Cara Breen RN  Outcome: Progressing  7/17/2024 0735 by Cara Breen RN  Outcome: Progressing  Goal: Pace activities to prevent fatigue by end of the shift  7/17/2024 0735 by Cara Breen RN  Outcome: Progressing  7/17/2024 0735 by Cara Breen RN  Outcome: Progressing     Problem: Pain  Goal: Takes deep breaths with improved pain control throughout the shift  7/17/2024 0735 by Cara Breen RN  Outcome: Progressing  7/17/2024 0735 by Cara Breen RN  Outcome: Progressing  Goal: Turns in bed with improved pain control throughout the shift  7/17/2024 0735 by Cara Breen RN  Outcome: Progressing  7/17/2024 0735 by Cara Breen RN  Outcome: Progressing  Goal: Walks with improved pain control throughout the shift  7/17/2024 0735 by Cara Breen RN  Outcome:  Progressing  7/17/2024 0735 by Cara Breen RN  Outcome: Progressing  Goal: Performs ADL's with improved pain control throughout shift  7/17/2024 0735 by Cara Breen RN  Outcome: Progressing  7/17/2024 0735 by Cara Breen RN  Outcome: Progressing  Goal: Participates in PT with improved pain control throughout the shift  7/17/2024 0735 by Cara Breen RN  Outcome: Progressing  7/17/2024 0735 by Cara Breen RN  Outcome: Progressing  Goal: Free from opioid side effects throughout the shift  7/17/2024 0735 by Cara Breen RN  Outcome: Progressing  7/17/2024 0735 by Cara Breen RN  Outcome: Progressing  Goal: Free from acute confusion related to pain meds throughout the shift  7/17/2024 0735 by Cara Breen RN  Outcome: Progressing  7/17/2024 0735 by Cara Breen RN  Outcome: Progressing     Problem: Pain - Adult  Goal: Verbalizes/displays adequate comfort level or baseline comfort level  7/17/2024 0735 by Cara Breen RN  Outcome: Progressing  7/17/2024 0735 by Cara Breen RN  Outcome: Progressing     Problem: Safety - Adult  Goal: Free from fall injury  7/17/2024 0735 by Cara Breen RN  Outcome: Progressing  7/17/2024 0735 by Cara Breen RN  Outcome: Progressing     Problem: Discharge Planning  Goal: Discharge to home or other facility with appropriate resources  7/17/2024 0735 by Cara Breen RN  Outcome: Progressing  7/17/2024 0735 by Cara Breen RN  Outcome: Progressing     Problem: Chronic Conditions and Co-morbidities  Goal: Patient's chronic conditions and co-morbidity symptoms are monitored and maintained or improved  7/17/2024 0735 by Cara Breen RN  Outcome: Progressing  7/17/2024 0735 by Cara Breen RN  Outcome: Progressing   .

## 2024-07-17 NOTE — PROGRESS NOTES
Acute Pain Service  Postop Pain HPI -   Palliative: relieved with IV analgesics and regional local anesthetics  Provocative: movement  Quality:  burning and aching  Radiation:  none  Severity:  0/10  Timing: constant    24-HOUR OPIOID CONSUMPTION:  0    Scheduled medications  enoxaparin, 30 mg, subcutaneous, q12h  polyethylene glycol, 17 g, oral, Daily      Continuous medications     PRN medications  PRN medications: albuterol, methocarbamol, naloxone, naloxone, [DISCONTINUED] ondansetron ODT **OR** [Transfer Hold] ondansetron, ondansetron **OR** ondansetron, oxyCODONE, oxyCODONE     Physical Exam:  Constitutional:  no distress, alert and cooperative  Eyes: clear sclera  Head/Neck: No apparent injury, trachea midline  Respiratory/Thorax: Patent airways, thorax symmetric, breathing comfortably  Cardiovascular: no pitting edema  Gastrointestinal: Nondistended  Musculoskeletal: ROM intact  Extremities: no clubbing  Neurological: alert, irene x4  Psychological: Appropriate affect    No results found for this or any previous visit (from the past 24 hour(s)).        Plan:  - adductor canal and popliteal blocks with catheters performed preoperatively on 7/16  - Ambit ball with Ropivacaine 0.2%/NaCl 0.9% 500mL, Rate 7 cc/hr each  - Ambit medication will not interfere with pain medication prescribed by the primary team.   - Please be aware of local anesthetic toxic dose and absorption variability before considering lidocaine patches  - Acute pain service will follow while catheters in place  - Tentative plan for RTOR w Dr. Vasquez this Friday, 7/19. Will discuss with surgery team tomorrow regarding dispo and adjust pain regimen as indicated.  - Rest of pain management per primary team     Acute Pain Resident  pg [phone] ph [phone]

## 2024-07-17 NOTE — PROGRESS NOTES
Discussed with patient about postoperative course once discharged from the hospital. Appropriate contact information was given to the patient. They were advised to call for any questions or concerns.

## 2024-07-17 NOTE — PROGRESS NOTES
Orthopaedic Surgery Progress Note    Subjective:  Resting comfortably in bed this AM. Leg elevated. No acute issues. Pain controlled. Denies N/V. Denies SOB/chest pain. Denies N/T.    Objective:  Vitals:    07/17/24 0013   BP: 126/74   Pulse: 77   Resp: 16   Temp: 36.2 °C (97.2 °F)   SpO2: 93%     Physical Exam  - Constitutional: No acute distress, cooperative  - Eyes: EOM grossly intact  - Head/Neck: Trachea midline  - Respiratory/Thorax: NWOB  - Cardiovascular: RRR on peripheral palpation  - Gastrointestinal: Nondistended  - Psychological: Appropriate mood/behavior  - Skin: Warm and dry. Additional findings in musculoskeletal evaluation  - Musculoskeletal:  ---  Left lower extremity:  - Ex-fix w dressing C/D/I  - Compartments soft and compressible  - Fires TA/GS/EHL  - SILT in Miller/Sa/SP/DP/T distribution  - Palpable DP pulse      Results for orders placed or performed during the hospital encounter of 07/15/24 (from the past 24 hour(s))   Transthoracic Echo (TTE) Complete   Result Value Ref Range    AV pk kaya 1.25 m/s    LVOT diam 2.00 cm    MV E/A ratio 0.93     Tricuspid annular plane systolic excursion 2.2 cm    LV EF 63 %    RV free wall pk S' 13.30 cm/s    Aortic Valve Area by Continuity of Peak Velocity 2.61 cm2    AV pk grad 6.3 mmHg    LV A4C EF 72.8    Lower extremity venous duplex bilateral   Result Value Ref Range    BSA 1.63 m2   CBC   Result Value Ref Range    WBC 6.7 4.4 - 11.3 x10*3/uL    nRBC 0.0 0.0 - 0.0 /100 WBCs    RBC 4.32 4.00 - 5.20 x10*6/uL    Hemoglobin 10.4 (L) 12.0 - 16.0 g/dL    Hematocrit 33.6 (L) 36.0 - 46.0 %    MCV 78 (L) 80 - 100 fL    MCH 24.1 (L) 26.0 - 34.0 pg    MCHC 31.0 (L) 32.0 - 36.0 g/dL    RDW 15.4 (H) 11.5 - 14.5 %    Platelets 207 150 - 450 x10*3/uL       CT ankle left wo IV contrast   Final Result   1. Interval external fixation of comminuted distal tibial pilon   fracture and distal fibular fracture with similar alignment from the   prior.   2. Small intra-articular  body in the ankle joint laterally        I personally reviewed the images/study and I agree with the findings   as stated by Dr. Balaji Tapia. This study was interpreted at [place], [place].        MACRO:   None        Signed by: Zurdo Chino 7/16/2024 6:57 PM   Dictation workstation:   [id number]      FL fluoro images no charge   Final Result      Lower extremity venous duplex bilateral         Transthoracic Echo (TTE) Complete   Final Result      XR ankle left 3+ views   Final Result   Status post splinting of impacted distal tibial and fibular fractures   without significant change in alignment when compared to prior.        I personally reviewed the images/study and I agree with the findings   as stated by Marco A Lucas MD (Radiology Resident).   This study was interpreted at [place], [place].        MACRO:   None        Signed by: Kiesha Ordonez 7/16/2024 3:14 PM   Dictation workstation:   [id number]      XR chest 1 view   Final Result   1.  Atelectasis within the right mid lung and bilateral lung bases.   No consolidation, pleural effusion or pneumothorax.        I personally reviewed the images/study and I agree with the findings   as stated by Marco A Lucas MD (Radiology Resident).   This study was interpreted at [place], [place].        MACRO:   None        Signed by: Alexx Schultz 7/16/2024 10:20 AM   Dictation workstation:   [id number]        Assessment:  73F (COPD on home O2, DANG, CHF, HTN) direct admit from [facility] p/w above after mGLF. Closed, NVI. CT w large posterior mal, small anterior cortical defect. Now s/p L ankle-spanning Ex-Fix with Dr. Mann.     Plan:  - Weight-bearing status: NWB LLE  - RTOR w Dr. Vasquez this Friday, 7/19  - Post ex-fix CT L ankle complete  - Embolic ppx: Lovenox  - Feeding: Regular diet as  tolerated, bowel regimen  - Analgesia: Multimodal  - Volume: mIV @ at  cc/hr, HLIVF when tolerating PO, monitor BMP  - OT/PT: Consulted  - Respiratory: Encourage IS, maintain O2 sat >92%  - Infection: Madai-operative Ancef for 24 hours, afebrile   - Lines: Maintain PIVx2 while inpatient  - Drains: N  - Rhodes: N  - Transfusion: Trend CBC, no indication for transfusion  - Cardiac: No issues  - Glycemic: No issues  - C/w home medications  - Dispo pending PT, pain control    D/w Dr. Vasquez    This patient will be followed by the Orthopaedic Trauma service. Please page or Epic Chat the corresponding residents below with questions or concerns.     Ortho Trauma Service (Epic Chat Preferred)  First call: Félix Lazo MD PGY-1  First call: Fortunato Goss MD PGY-1  Second call: Renzo Samuels, PGY-2  Third call: Donte Zaragoza PGY-3    6pm-6am M-F, Holidays, and weekends page Ortho on-call @[phone] with urgent questions/concerns.     Renzo Samuels MD  Orthopaedic Surgery PGY-1  On-call pager [phone]

## 2024-07-18 LAB
ABO GROUP (TYPE) IN BLOOD: NORMAL
ANTIBODY SCREEN: NORMAL
RH FACTOR (ANTIGEN D): NORMAL

## 2024-07-18 PROCEDURE — 2500000001 HC RX 250 WO HCPCS SELF ADMINISTERED DRUGS (ALT 637 FOR MEDICARE OP)

## 2024-07-18 PROCEDURE — 1100000001 HC PRIVATE ROOM DAILY

## 2024-07-18 PROCEDURE — 86901 BLOOD TYPING SEROLOGIC RH(D): CPT

## 2024-07-18 PROCEDURE — 94640 AIRWAY INHALATION TREATMENT: CPT

## 2024-07-18 PROCEDURE — 2500000004 HC RX 250 GENERAL PHARMACY W/ HCPCS (ALT 636 FOR OP/ED)

## 2024-07-18 PROCEDURE — 2500000002 HC RX 250 W HCPCS SELF ADMINISTERED DRUGS (ALT 637 FOR MEDICARE OP, ALT 636 FOR OP/ED)

## 2024-07-18 PROCEDURE — 36415 COLL VENOUS BLD VENIPUNCTURE: CPT

## 2024-07-18 PROCEDURE — 99231 SBSQ HOSP IP/OBS SF/LOW 25: CPT | Performed by: STUDENT IN AN ORGANIZED HEALTH CARE EDUCATION/TRAINING PROGRAM

## 2024-07-18 RX ORDER — ACETAMINOPHEN 325 MG/1
650 TABLET ORAL EVERY 4 HOURS PRN
Status: DISCONTINUED | OUTPATIENT
Start: 2024-07-18 | End: 2024-07-29 | Stop reason: HOSPADM

## 2024-07-18 RX ORDER — IPRATROPIUM BROMIDE AND ALBUTEROL SULFATE 2.5; .5 MG/3ML; MG/3ML
3 SOLUTION RESPIRATORY (INHALATION)
Status: DISCONTINUED | OUTPATIENT
Start: 2024-07-18 | End: 2024-07-19

## 2024-07-18 RX ORDER — LEVOTHYROXINE SODIUM 100 UG/1
100 TABLET ORAL DAILY
Status: DISCONTINUED | OUTPATIENT
Start: 2024-07-18 | End: 2024-07-29 | Stop reason: HOSPADM

## 2024-07-18 RX ORDER — FUROSEMIDE 40 MG/1
40 TABLET ORAL DAILY
Status: DISCONTINUED | OUTPATIENT
Start: 2024-07-18 | End: 2024-07-22

## 2024-07-18 RX ORDER — POTASSIUM CHLORIDE 1.5 G/1.58G
20 POWDER, FOR SOLUTION ORAL DAILY
Status: DISCONTINUED | OUTPATIENT
Start: 2024-07-18 | End: 2024-07-29 | Stop reason: HOSPADM

## 2024-07-18 SDOH — HEALTH STABILITY: MENTAL HEALTH: CURRENT SMOKER: 0

## 2024-07-18 ASSESSMENT — PAIN DESCRIPTION - DESCRIPTORS: DESCRIPTORS: ACHING

## 2024-07-18 ASSESSMENT — PAIN - FUNCTIONAL ASSESSMENT
PAIN_FUNCTIONAL_ASSESSMENT: 0-10

## 2024-07-18 ASSESSMENT — COGNITIVE AND FUNCTIONAL STATUS - GENERAL
DRESSING REGULAR LOWER BODY CLOTHING: A LITTLE
MOBILITY SCORE: 18
DAILY ACTIVITIY SCORE: 22
MOVING TO AND FROM BED TO CHAIR: A LITTLE
HELP NEEDED FOR BATHING: A LITTLE
STANDING UP FROM CHAIR USING ARMS: A LITTLE
CLIMB 3 TO 5 STEPS WITH RAILING: TOTAL
WALKING IN HOSPITAL ROOM: A LITTLE

## 2024-07-18 ASSESSMENT — PAIN SCALES - GENERAL
PAINLEVEL_OUTOF10: 7
PAINLEVEL_OUTOF10: 0 - NO PAIN
PAINLEVEL_OUTOF10: 6
PAINLEVEL_OUTOF10: 7
PAINLEVEL_OUTOF10: 4
PAINLEVEL_OUTOF10: 3

## 2024-07-18 ASSESSMENT — PAIN DESCRIPTION - LOCATION: LOCATION: HEAD

## 2024-07-18 NOTE — PROGRESS NOTES
Acute Pain Service  Postop Pain HPI -   Palliative: relieved with IV analgesics and regional local anesthetics  Provocative: movement  Quality:  burning and aching  Radiation:  none  Severity:  6-7/10  Timing: constant    24-HOUR OPIOID CONSUMPTION:  Oxy 10mg    Scheduled medications  enoxaparin, 30 mg, subcutaneous, q12h  levothyroxine, 100 mcg, oral, Daily  polyethylene glycol, 17 g, oral, Daily  tiotropium, 2 puff, inhalation, Daily      Continuous medications     PRN medications  PRN medications: albuterol, albuterol, ALPRAZolam, methocarbamol, naloxone, naloxone, [DISCONTINUED] ondansetron ODT **OR** [Transfer Hold] ondansetron, ondansetron **OR** ondansetron, oxyCODONE, oxyCODONE     Physical Exam:  Constitutional:  no distress, alert and cooperative  Eyes: clear sclera  Head/Neck: No apparent injury, trachea midline  Respiratory/Thorax: Patent airways, thorax symmetric, breathing comfortably  Cardiovascular: no pitting edema  Gastrointestinal: Nondistended  Musculoskeletal: ROM intact  Extremities: no clubbing  Neurological: alert, irene x4  Psychological: Appropriate affect    No results found for this or any previous visit (from the past 24 hour(s)).        Plan:  - adductor canal and popliteal blocks with catheters performed preoperatively on 7/16  - Ambit ball with Ropivacaine 0.2%/NaCl 0.9% 500mL, Rate 7 cc/hr each  - Ambit medication will not interfere with pain medication prescribed by the primary team.   - Please be aware of local anesthetic toxic dose and absorption variability before considering lidocaine patches  - Acute pain service will follow while catheters in place  - Refill 7/18  - Tentative plan for RTOR w Dr. Vasquez this Friday, 7/19. Will discuss with surgery team tomorrow regarding dispo and adjust pain regimen as indicated.  - Rest of pain management per primary team

## 2024-07-18 NOTE — CARE PLAN
The clinical goals for the shift include pain control and safety      Problem: Pain - Adult  Goal: Verbalizes/displays adequate comfort level or baseline comfort level  Outcome: Progressing     Problem: Safety - Adult  Goal: Free from fall injury  Outcome: Progressing     Problem: Discharge Planning  Goal: Discharge to home or other facility with appropriate resources  Outcome: Progressing     Problem: Chronic Conditions and Co-morbidities  Goal: Patient's chronic conditions and co-morbidity symptoms are monitored and maintained or improved  Outcome: Progressing     Patient remained safe and free from injury/falls by end of shift. Pain controlled with pain block and PRN medication.

## 2024-07-18 NOTE — DOCUMENTATION CLARIFICATION NOTE
"    PATIENT:               LOMBARDO, RENEE  ACCT #:                  [id number]  MRN:                       [id number]  :                       1950  ADMIT DATE:       7/15/2024 11:28 PM  DISCH DATE:  RESPONDING PROVIDER #:        [id number]          PROVIDER RESPONSE TEXT:    Chronic Respiratory Failure    CDI QUERY TEXT:    Clarification        Instruction:    Based on your assessment of the patient and the clinical information, please provide the requested documentation by clicking on the appropriate radio button and enter any additional information if prompted.    Question: Is there a diagnosis indicative of the clinical information    When answering this query, please exercise your independent professional judgment. The fact that a question is being asked, does not imply that any particular answer is desired or expected.    The patient's clinical indicators include:  Clinical Information:  Progress Note 24 by Dr. Renzo Samuels:    \"73F -COPD on home O2, DANG, CHF, HTN - direct admit from [facility] p/w above after mGLF. Closed, NVI. CT w large posterior mal, small anterior cortical defect. Now s/p L ankle-spanning Ex-Fix with Dr. Mann.\"    Clinical Indicators:  Consult Note 24 by Dr. Lorne Gleason:    \"Pt has a history of COPD requiring 2-3L O2 during exacerbations and vigorous exertion, DANG on nightly BiPAP. Her CXR and previous CT chest concerning for significant RLL atelectasis.\"    Treatment:  Nightly BiPAP, O2 - 2L as needed during exacerbations and with vigorous exertion, Lasix 40mg, Trelegy and Albuterol inhalers daily and Ipatropium nebulizer as needed, Singulair.    Risk Factors: COPD  Options provided:  -- Chronic Respiratory Failure  -- Chronic Hypoxemic Respiratory Failure  -- Chronic Hypercapnic Respiratory Failure  -- Chronic Hypoxemic and Hypercapnic Respiratory Failure  -- Other - I will add my own diagnosis  -- Refer to Clinical Documentation Reviewer    Query created by: Michael, " Alis infante 7/17/2024 11:03 AM      Electronically signed by:  JUAN SHAW MD 7/18/2024 4:52 PM

## 2024-07-18 NOTE — PROGRESS NOTES
Orthopaedic Surgery Progress Note    Subjective:  Resting comfortably in bed this AM. Leg elevated. No acute issues. Pain controlled. Denies N/V. Denies SOB/chest pain. Denies N/T.    Objective:  Vitals:    07/18/24 0044   BP: 115/67   Pulse: 76   Resp: 14   Temp: 36.1 °C (97 °F)   SpO2: 94%     Physical Exam  - Constitutional: No acute distress, cooperative  - Eyes: EOM grossly intact  - Head/Neck: Trachea midline  - Respiratory/Thorax: NWOB  - Cardiovascular: RRR on peripheral palpation  - Gastrointestinal: Nondistended  - Psychological: Appropriate mood/behavior  - Skin: Warm and dry. Additional findings in musculoskeletal evaluation  - Musculoskeletal:  ---  Left lower extremity:  - Ex-fix w dressing C/D/I  - Compartments soft and compressible  - Fires TA/GS/EHL  - SILT in Miller/Sa/SP/DP/T distribution  - Palpable DP pulse      No results found for this or any previous visit (from the past 24 hour(s)).      CT ankle left wo IV contrast   Final Result   1. Interval external fixation of comminuted distal tibial pilon   fracture and distal fibular fracture with similar alignment from the   prior.   2. Small intra-articular body in the ankle joint laterally        I personally reviewed the images/study and I agree with the findings   as stated by Dr. Balaji Tapia. This study was interpreted at [place], [place].        MACRO:   None        Signed by: Zurdo Chino 7/16/2024 6:57 PM   Dictation workstation:   [id number]      FL fluoro images no charge   Final Result      Lower extremity venous duplex bilateral         Transthoracic Echo (TTE) Complete   Final Result      XR ankle left 3+ views   Final Result   Status post splinting of impacted distal tibial and fibular fractures   without significant change in alignment when compared to prior.        I personally reviewed the images/study and I agree with the findings   as stated by Marco A Lucas MD  (Radiology Resident).   This study was interpreted at [place], [place].        MACRO:   None        Signed by: Kiesha Ordonez 7/16/2024 3:14 PM   Dictation workstation:   [id number]      XR chest 1 view   Final Result   1.  Atelectasis within the right mid lung and bilateral lung bases.   No consolidation, pleural effusion or pneumothorax.        I personally reviewed the images/study and I agree with the findings   as stated by Marco A Lucas MD (Radiology Resident).   This study was interpreted at [place], [place].        MACRO:   None        Signed by: Alexx Schultz 7/16/2024 10:20 AM   Dictation workstation:   [id number]        Assessment:  73F (COPD on home O2, DANG, CHF, HTN) direct admit [facility] p/w above after mGLF. Closed, NVI. CT w large posterior mal, small anterior cortical defect. Now s/p L ankle-spanning Ex-Fix with Dr. Mann.     Plan:  - Weight-bearing status: NWB LLE  - RTOR w Dr. Vasquez tomorrow, 7/19  - Post ex-fix CT L ankle complete  - Embolic ppx: Lovenox, hold @ midnight 7/19  - Feeding: Regular diet as tolerated, bowel regimen  - NPO at midnight 7/19  - Analgesia: Multimodal  - Volume: mIV @ at  cc/hr, HLIVF when tolerating PO, monitor BMP  - OT/PT: Consulted  - Respiratory: Encourage IS, maintain O2 sat >92%  - Infection: Madai-operative Ancef for 24 hours, afebrile   - Lines: Maintain PIVx2 while inpatient  - Drains: N  - Rhodes: N  - Transfusion: Trend CBC, no indication for transfusion  - Cardiac: No issues  - Glycemic: No issues  - C/w home medications  - Dispo pending PT, pain control    D/w Dr. Vasquez    This patient will be followed by the Orthopaedic Trauma service. Please page or Epic Chat the corresponding residents below with questions or concerns.     Ortho Trauma Service (Epic Chat Preferred)  First call: Félix Lazo MD PGY-1  First call: Fortunato Goss MD  PGY-1  Second call: Renzo Samuels, PGY-2  Third call: Donte Zaragoza PGY-3    6pm-6am M-F, Holidays, and weekends page Ortho on-call @[phone] with urgent questions/concerns.     Renzo Samuels MD  Orthopaedic Surgery PGY-1  On-call pager [phone]

## 2024-07-19 ENCOUNTER — ANESTHESIA (OUTPATIENT)
Dept: OPERATING ROOM | Facility: HOSPITAL | Age: 74
End: 2024-07-19
Payer: MEDICARE

## 2024-07-19 ENCOUNTER — DOCUMENTATION (OUTPATIENT)
Dept: CASE MANAGEMENT | Facility: HOSPITAL | Age: 74
End: 2024-07-19

## 2024-07-19 ENCOUNTER — APPOINTMENT (OUTPATIENT)
Dept: CARDIOLOGY | Facility: HOSPITAL | Age: 74
DRG: 493 | End: 2024-07-19
Payer: MEDICARE

## 2024-07-19 ENCOUNTER — APPOINTMENT (OUTPATIENT)
Dept: RADIOLOGY | Facility: HOSPITAL | Age: 74
DRG: 493 | End: 2024-07-19
Payer: MEDICARE

## 2024-07-19 PROBLEM — G47.33 OSA (OBSTRUCTIVE SLEEP APNEA): Status: ACTIVE | Noted: 2024-07-19

## 2024-07-19 PROBLEM — I10 HTN (HYPERTENSION): Status: ACTIVE | Noted: 2024-07-19

## 2024-07-19 PROBLEM — I50.9 CHF (CONGESTIVE HEART FAILURE) (MULTI): Status: ACTIVE | Noted: 2024-07-19

## 2024-07-19 PROBLEM — I82.409 DVT (DEEP VENOUS THROMBOSIS) (MULTI): Status: ACTIVE | Noted: 2024-07-19

## 2024-07-19 PROBLEM — J44.9 CHRONIC OBSTRUCTIVE PULMONARY DISEASE (MULTI): Status: ACTIVE | Noted: 2024-07-19

## 2024-07-19 PROBLEM — K21.9 GASTROESOPHAGEAL REFLUX DISEASE: Status: ACTIVE | Noted: 2024-07-19

## 2024-07-19 PROBLEM — E66.9 OBESITY: Status: ACTIVE | Noted: 2024-07-19

## 2024-07-19 LAB
ATRIAL RATE: 77 BPM
ATRIAL RATE: 77 BPM
P AXIS: 55 DEGREES
P AXIS: 64 DEGREES
P OFFSET: 201 MS
P OFFSET: 206 MS
P ONSET: 141 MS
P ONSET: 143 MS
PR INTERVAL: 166 MS
PR INTERVAL: 170 MS
Q ONSET: 224 MS
Q ONSET: 228 MS
QRS COUNT: 13 BEATS
QRS COUNT: 13 BEATS
QRS DURATION: 64 MS
QRS DURATION: 72 MS
QT INTERVAL: 390 MS
QT INTERVAL: 414 MS
QTC CALCULATION(BAZETT): 441 MS
QTC CALCULATION(BAZETT): 468 MS
QTC FREDERICIA: 423 MS
QTC FREDERICIA: 449 MS
R AXIS: 30 DEGREES
R AXIS: 35 DEGREES
T AXIS: 60 DEGREES
T AXIS: 61 DEGREES
T OFFSET: 423 MS
T OFFSET: 431 MS
VENTRICULAR RATE: 77 BPM
VENTRICULAR RATE: 77 BPM

## 2024-07-19 PROCEDURE — 93005 ELECTROCARDIOGRAM TRACING: CPT

## 2024-07-19 PROCEDURE — 1100000001 HC PRIVATE ROOM DAILY

## 2024-07-19 PROCEDURE — 2500000004 HC RX 250 GENERAL PHARMACY W/ HCPCS (ALT 636 FOR OP/ED): Performed by: STUDENT IN AN ORGANIZED HEALTH CARE EDUCATION/TRAINING PROGRAM

## 2024-07-19 PROCEDURE — 2500000001 HC RX 250 WO HCPCS SELF ADMINISTERED DRUGS (ALT 637 FOR MEDICARE OP): Performed by: STUDENT IN AN ORGANIZED HEALTH CARE EDUCATION/TRAINING PROGRAM

## 2024-07-19 PROCEDURE — 94640 AIRWAY INHALATION TREATMENT: CPT

## 2024-07-19 PROCEDURE — 2500000004 HC RX 250 GENERAL PHARMACY W/ HCPCS (ALT 636 FOR OP/ED)

## 2024-07-19 PROCEDURE — 99223 1ST HOSP IP/OBS HIGH 75: CPT | Performed by: STUDENT IN AN ORGANIZED HEALTH CARE EDUCATION/TRAINING PROGRAM

## 2024-07-19 PROCEDURE — 2500000002 HC RX 250 W HCPCS SELF ADMINISTERED DRUGS (ALT 637 FOR MEDICARE OP, ALT 636 FOR OP/ED)

## 2024-07-19 PROCEDURE — 93010 ELECTROCARDIOGRAM REPORT: CPT | Performed by: INTERNAL MEDICINE

## 2024-07-19 PROCEDURE — 71045 X-RAY EXAM CHEST 1 VIEW: CPT

## 2024-07-19 PROCEDURE — 99231 SBSQ HOSP IP/OBS SF/LOW 25: CPT

## 2024-07-19 PROCEDURE — 2500000002 HC RX 250 W HCPCS SELF ADMINISTERED DRUGS (ALT 637 FOR MEDICARE OP, ALT 636 FOR OP/ED): Performed by: STUDENT IN AN ORGANIZED HEALTH CARE EDUCATION/TRAINING PROGRAM

## 2024-07-19 PROCEDURE — 71045 X-RAY EXAM CHEST 1 VIEW: CPT | Performed by: RADIOLOGY

## 2024-07-19 PROCEDURE — 2500000001 HC RX 250 WO HCPCS SELF ADMINISTERED DRUGS (ALT 637 FOR MEDICARE OP)

## 2024-07-19 RX ORDER — FLUTICASONE FUROATE AND VILANTEROL 200; 25 UG/1; UG/1
1 POWDER RESPIRATORY (INHALATION) DAILY
Status: DISCONTINUED | OUTPATIENT
Start: 2024-07-19 | End: 2024-07-22

## 2024-07-19 RX ORDER — PREDNISONE 20 MG/1
10 TABLET ORAL DAILY
Status: DISCONTINUED | OUTPATIENT
Start: 2024-07-27 | End: 2024-07-22

## 2024-07-19 RX ORDER — IPRATROPIUM BROMIDE AND ALBUTEROL SULFATE 2.5; .5 MG/3ML; MG/3ML
3 SOLUTION RESPIRATORY (INHALATION)
Status: DISCONTINUED | OUTPATIENT
Start: 2024-07-19 | End: 2024-07-20

## 2024-07-19 RX ORDER — HYDROMORPHONE HYDROCHLORIDE 1 MG/ML
0.2 INJECTION, SOLUTION INTRAMUSCULAR; INTRAVENOUS; SUBCUTANEOUS EVERY 4 HOURS PRN
Status: DISCONTINUED | OUTPATIENT
Start: 2024-07-19 | End: 2024-07-29 | Stop reason: HOSPADM

## 2024-07-19 RX ORDER — PREDNISONE 20 MG/1
30 TABLET ORAL DAILY
Status: DISCONTINUED | OUTPATIENT
Start: 2024-07-19 | End: 2024-07-22

## 2024-07-19 RX ORDER — PROPOFOL 10 MG/ML
INJECTION, EMULSION INTRAVENOUS CONTINUOUS PRN
Status: DISCONTINUED | OUTPATIENT
Start: 2024-07-19 | End: 2024-07-20 | Stop reason: HOSPADM

## 2024-07-19 RX ORDER — PREDNISONE 20 MG/1
20 TABLET ORAL DAILY
Status: DISCONTINUED | OUTPATIENT
Start: 2024-07-23 | End: 2024-07-22

## 2024-07-19 RX ORDER — FENTANYL CITRATE 50 UG/ML
INJECTION, SOLUTION INTRAMUSCULAR; INTRAVENOUS CONTINUOUS PRN
Status: DISCONTINUED | OUTPATIENT
Start: 2024-07-19 | End: 2024-07-20 | Stop reason: HOSPADM

## 2024-07-19 RX ORDER — MIDAZOLAM HYDROCHLORIDE 1 MG/ML
INJECTION INTRAMUSCULAR; INTRAVENOUS CONTINUOUS PRN
Status: DISCONTINUED | OUTPATIENT
Start: 2024-07-19 | End: 2024-07-20 | Stop reason: HOSPADM

## 2024-07-19 RX ORDER — IPRATROPIUM BROMIDE AND ALBUTEROL SULFATE 2.5; .5 MG/3ML; MG/3ML
SOLUTION RESPIRATORY (INHALATION)
Status: DISPENSED
Start: 2024-07-19 | End: 2024-07-20

## 2024-07-19 RX ORDER — ALBUTEROL SULFATE 0.83 MG/ML
2.5 SOLUTION RESPIRATORY (INHALATION) ONCE
Status: COMPLETED | OUTPATIENT
Start: 2024-07-19 | End: 2024-07-19

## 2024-07-19 ASSESSMENT — PAIN SCALES - GENERAL
PAINLEVEL_OUTOF10: 0 - NO PAIN
PAINLEVEL_OUTOF10: 7
PAINLEVEL_OUTOF10: 0 - NO PAIN
PAINLEVEL_OUTOF10: 0 - NO PAIN

## 2024-07-19 ASSESSMENT — COGNITIVE AND FUNCTIONAL STATUS - GENERAL
MOVING FROM LYING ON BACK TO SITTING ON SIDE OF FLAT BED WITH BEDRAILS: A LITTLE
DRESSING REGULAR UPPER BODY CLOTHING: A LITTLE
CLIMB 3 TO 5 STEPS WITH RAILING: A LITTLE
STANDING UP FROM CHAIR USING ARMS: A LITTLE
HELP NEEDED FOR BATHING: A LITTLE
WALKING IN HOSPITAL ROOM: A LITTLE
EATING MEALS: A LITTLE
DRESSING REGULAR LOWER BODY CLOTHING: A LITTLE
DRESSING REGULAR LOWER BODY CLOTHING: A LITTLE
MOVING TO AND FROM BED TO CHAIR: A LITTLE
HELP NEEDED FOR BATHING: A LITTLE
CLIMB 3 TO 5 STEPS WITH RAILING: A LOT
PERSONAL GROOMING: A LITTLE
WALKING IN HOSPITAL ROOM: A LITTLE
MOBILITY SCORE: 18
STANDING UP FROM CHAIR USING ARMS: A LITTLE
TURNING FROM BACK TO SIDE WHILE IN FLAT BAD: A LITTLE
TURNING FROM BACK TO SIDE WHILE IN FLAT BAD: A LITTLE
DRESSING REGULAR UPPER BODY CLOTHING: A LITTLE
MOVING FROM LYING ON BACK TO SITTING ON SIDE OF FLAT BED WITH BEDRAILS: A LITTLE
DAILY ACTIVITIY SCORE: 20
MOVING TO AND FROM BED TO CHAIR: A LITTLE
MOBILITY SCORE: 17
TOILETING: A LITTLE

## 2024-07-19 ASSESSMENT — ENCOUNTER SYMPTOMS
SHORTNESS OF BREATH: 0
RHINORRHEA: 0
PALPITATIONS: 0
SORE THROAT: 0
CONSTITUTIONAL NEGATIVE: 1
GASTROINTESTINAL NEGATIVE: 1
CHEST TIGHTNESS: 0
COUGH: 0
WHEEZING: 1
CARDIOVASCULAR NEGATIVE: 1

## 2024-07-19 ASSESSMENT — PAIN - FUNCTIONAL ASSESSMENT
PAIN_FUNCTIONAL_ASSESSMENT: 0-10

## 2024-07-19 ASSESSMENT — COLUMBIA-SUICIDE SEVERITY RATING SCALE - C-SSRS
1. IN THE PAST MONTH, HAVE YOU WISHED YOU WERE DEAD OR WISHED YOU COULD GO TO SLEEP AND NOT WAKE UP?: NO
6. HAVE YOU EVER DONE ANYTHING, STARTED TO DO ANYTHING, OR PREPARED TO DO ANYTHING TO END YOUR LIFE?: NO
2. HAVE YOU ACTUALLY HAD ANY THOUGHTS OF KILLING YOURSELF?: NO

## 2024-07-19 NOTE — PROGRESS NOTES
Acute Pain Service    Postop Pain HPI -   Palliative: relieved with IV analgesics and regional local anesthetics  Provocative: movement  Quality:  burning and aching  Radiation:  none  Severity:  3/10  Timing: constant    24-HOUR OPIOID CONSUMPTION:  Oxy 10mg    Scheduled medications  fluticasone furoate-vilanteroL, 1 puff, inhalation, Daily  furosemide, 40 mg, oral, Daily  ipratropium-albuteroL, 3 mL, nebulization, q6h  ipratropium-albuteroL, , ,   levothyroxine, 100 mcg, oral, Daily  perflutren protein A microsphere, 0.5 mL, intravenous, Once in imaging  polyethylene glycol, 17 g, oral, Daily  potassium chloride, 20 mEq, oral, Daily  predniSONE, 30 mg, oral, Daily   Followed by  [START ON 7/23/2024] predniSONE, 20 mg, oral, Daily   Followed by  [START ON 7/27/2024] predniSONE, 10 mg, oral, Daily  sulfur hexafluoride microsphr, 2 mL, intravenous, Once in imaging      Continuous medications     PRN medications  PRN medications: [Transfer Hold] acetaminophen, albuterol, ALPRAZolam, HYDROmorphone, ipratropium-albuteroL, methocarbamol, naloxone, naloxone, [DISCONTINUED] ondansetron ODT **OR** [Transfer Hold] ondansetron, ondansetron **OR** ondansetron, oxyCODONE, oxyCODONE     Physical Exam:  Constitutional:  no distress, alert and cooperative  Eyes: clear sclera  Head/Neck: No apparent injury, trachea midline  Respiratory/Thorax: Patent airways, thorax symmetric, breathing comfortably  Cardiovascular: no pitting edema  Gastrointestinal: Nondistended  Musculoskeletal: ROM intact  Extremities: no clubbing  Neurological: alert, irene x4  Psychological: Appropriate affect    Results for orders placed or performed during the hospital encounter of 07/15/24 (from the past 24 hour(s))   Type And Screen   Result Value Ref Range    ABO TYPE O     Rh TYPE POS     ANTIBODY SCREEN NEG    ECG 12 Lead   Result Value Ref Range    Ventricular Rate 77 BPM    Atrial Rate 77 BPM    TN Interval 166 ms    QRS Duration 72 ms    QT Interval  414 ms    QTC Calculation(Bazett) 468 ms    P Axis 64 degrees    R Axis 30 degrees    T Axis 60 degrees    QRS Count 13 beats    Q Onset 224 ms    P Onset 141 ms    P Offset 201 ms    T Offset 431 ms    QTC Fredericia 449 ms           Plan:  - adductor canal and popliteal blocks with catheters performed preoperatively on 7/16, catheter removed today.  - Rest of pain management per primary team    Acute pain team will sign off.     Cari Sandoval, PGY II

## 2024-07-19 NOTE — PROGRESS NOTES
Orthopaedic Surgery Progress Note    Subjective:  Resting comfortably in bed this AM. Leg elevated. Received breathing treatment, on 2L O2. Pain controlled. Denies N/V. Endorsing some SOB on her baseline COPD. Denies N/T.    Objective:  Vitals:    07/19/24 0400   BP: 124/77   Pulse: 86   Resp: 15   Temp: 36.6 °C (97.9 °F)   SpO2: 93%     Physical Exam  - Constitutional: No acute distress, cooperative  - Eyes: EOM grossly intact  - Head/Neck: Trachea midline  - Respiratory/Thorax: NWOB  - Cardiovascular: RRR on peripheral palpation  - Gastrointestinal: Nondistended  - Psychological: Appropriate mood/behavior  - Skin: Warm and dry. Additional findings in musculoskeletal evaluation  - Musculoskeletal:  ---  Left lower extremity:  - Ex-fix w dressing C/D/I  - Compartments soft and compressible  - Fires TA/GS/EHL  - SILT in Miller/Sa/SP/DP/T distribution  - Palpable DP pulse      Results for orders placed or performed during the hospital encounter of 07/15/24 (from the past 24 hour(s))   Type And Screen   Result Value Ref Range    ABO TYPE O     Rh TYPE POS     ANTIBODY SCREEN NEG          CT ankle left wo IV contrast   Final Result   1. Interval external fixation of comminuted distal tibial pilon   fracture and distal fibular fracture with similar alignment from the   prior.   2. Small intra-articular body in the ankle joint laterally        I personally reviewed the images/study and I agree with the findings   as stated by Dr. Balaji Tapia. This study was interpreted at [place], [place].        MACRO:   None        Signed by: Zurdo Chino 7/16/2024 6:57 PM   Dictation workstation:   [id number]      FL fluoro images no charge   Final Result      Lower extremity venous duplex bilateral         Transthoracic Echo (TTE) Complete   Final Result      XR ankle left 3+ views   Final Result   Status post splinting of impacted distal tibial and fibular fractures   without significant  change in alignment when compared to prior.        I personally reviewed the images/study and I agree with the findings   as stated by Marco A Lucas MD (Radiology Resident).   This study was interpreted at [place], [place].        MACRO:   None        Signed by: Kiesha Ordonez 7/16/2024 3:14 PM   Dictation workstation:   [id number]      XR chest 1 view   Final Result   1.  Atelectasis within the right mid lung and bilateral lung bases.   No consolidation, pleural effusion or pneumothorax.        I personally reviewed the images/study and I agree with the findings   as stated by Marco A Lucas MD (Radiology Resident).   This study was interpreted at [place], [place].        MACRO:   None        Signed by: Alexx Schultz 7/16/2024 10:20 AM   Dictation workstation:   [id number]      FL less than 1 hour    (Results Pending)     Assessment:  73F (COPD on home O2, DANG, CHF, HTN) direct admit from [facility] p/w above after mGLF. Closed, NVI. CT w large posterior mal, small anterior cortical defect. Now s/p L ankle-spanning Ex-Fix with Dr. Mann.     Plan:  - Weight-bearing status: NWB LLE  - OR today w/ Dr. Vasquez for L hindfoot fusion nail, 7/19  - Post ex-fix CT L ankle complete  - Embolic ppx: Lovenox, hold @ midnight 7/19  - Feeding: Regular diet as tolerated, bowel regimen  - NPO   - Analgesia: Multimodal  - Volume: mIV @ at  cc/hr, HLIVF when tolerating PO, monitor BMP  - OT/PT: Consulted  - Respiratory: Encourage IS, maintain O2 sat >92%  - Infection: Madai-operative Ancef for 24 hours, afebrile   - Lines: Maintain PIVx2 while inpatient  - Drains: N  - Rhodes: N  - Transfusion: Trend CBC, no indication for transfusion  - Cardiac: No issues  - Glycemic: No issues  - C/w home medications - home COPD medications and breathing treatments  - Dispo pending PT, pain control    This patient  will be followed by the Orthopaedic Trauma service. Please page or Epic Chat the corresponding residents below with questions or concerns.     Ortho Trauma Service ([employer] Chat Preferred)  First call: Félix Lazo MD PGY-1  First call: Fortunato Goss MD PGY-1  Second call: Renzo Samuels, PGY-2  Third call: Donte Zaragoza, PGY-3    6pm-6am M-F, Holidays, and weekends page Ortho on-call @[phone] with urgent questions/concerns.     Shan Zaragoza MD  Orthopaedic Surgery, PGY-3  Available by Epic Chat  07/19/24  6:08 AM

## 2024-07-19 NOTE — H&P
[facility] Department of Orthopaedic Surgery   Surgical History & Physical <30 Days    Reason for Surgery: L pilon fx, L lateral mal fx  Planned Procedure: Removal of ex-fix, hindfoot fusion nail L ankle    History & Physical Reviewed:  I have reviewed the History and Physical within 30 days dated 7/16/24. Relevant findings and updates are noted below:  No significant changes.    Home medications were reviewed with significant updates noted below:  No significant changes.    ERAS patient?: No    COVID-19 Risk Consent:   Surgeon has reviewed the key risks related to yolanda COVID-19 and subsequent sequelae.     07/19/24 at 4:22 AM - Félix Lazo MD

## 2024-07-19 NOTE — CONSULTS
Consults    Reason For Consult  History of COPD, anesthesia notes increase in wheezing.     History Of Present Illness  Renee Lombardo is a 73 y.o. female presenting with history of chronic hypoxemic respiratory failure (on home O2 2 LPM via NC with exertion and at nighttime), severe COPD secondary to tobacco use, bronchiectasis, sub-centimeter pulmonary nodules, mild to moderate obstructive sleep apnea (on BIPAP at nighttime), chronic allergic rhinitis, chronic diastolic CHF, anxiety, hypertension, acid reflux disease, hypothyroidism, and OA S/P left hip replacement surgery, who is hospitalized for ORIF L ankle.     Currently Teresa is comfortable in bed in no acute distress. She endorses wheezing that began this morning prior to going to OR for ORIF of L ankle. The wheezing is constant and triggered by exertion. She has had episodes to this similar in the past with the need of steroids to alleviate symptoms. Patient also discusses not receiving her home medication of Trilogy. She denies any associated symptoms of sob, cough, xs sputum production or systemic symptoms.        Past Medical History  She has a past medical history of COPD (chronic obstructive pulmonary disease) (Multi), Disease of thyroid gland, GERD (gastroesophageal reflux disease), and Hypothyroidism.    She has no past medical history of Parathyroid disease (Multi).    Surgical History  She has a past surgical history that includes Other surgical history (03/23/2015).     Social History  Previous history of tobacco use. Social alcohol use. No drug use. No occupational or environmental exposures     Family History  No family history on file.     Allergies  Ceftriaxone, Iodinated contrast media, and Sulfamide    Review of Systems   Constitutional: Negative.    HENT:  Positive for congestion. Negative for rhinorrhea and sore throat.    Respiratory:  Positive for wheezing. Negative for cough, chest tightness and shortness of breath.    Cardiovascular:  Negative.  Negative for chest pain, palpitations and leg swelling.   Gastrointestinal: Negative.    All other systems reviewed and are negative.       Physical Exam  Constitutional:       General: She is not in acute distress.  Cardiovascular:      Heart sounds: Normal heart sounds. No murmur heard.     No friction rub. No gallop.   Pulmonary:      Breath sounds: Wheezing present.   Abdominal:      Tenderness: There is no abdominal tenderness.   Musculoskeletal:         General: Signs of injury present.   Skin:     Capillary Refill: Capillary refill takes less than 2 seconds.          Last Recorded Vitals  /77 (BP Location: Left arm, Patient Position: Lying)   Pulse 88   Temp 36.7 °C (98.1 °F) (Temporal)   Resp 16   Wt 65.8 kg (145 lb)   SpO2 95%        Assessment/Plan   Renee Lombardo is a 73 y.o. female presenting with pmh of chronic hypoxemic respiratory failure (on home O2 2 LPM via NC with exertion and at nighttime), severe COPD secondary to tobacco use, bronchiectasis, chronic allergic rhinitis, chronic diastolic CHF, anxiety, hypertension, acid reflux disease, hypothyroidism, and OA, who is hospitalized for ORIF L ankle. Pulmonology was consulted for increase in wheezing with a history of COPD and exacerbations In addition to a R lower lobe collapse finding on CT imaging from 9/15/24.     # Chronic obstructive pulmonary disease exacerbation   : Symptoms of congestion, episode of cough with sputum production  : Similar episodes in past that resolved with increased management  Plan  - CXR   - Tapered Prednisone 30 mg PO, 20 mg PO, 10 mg PO.   - Continue home medication- Trilogy, albuterol  - CT Chest wo IV contrast following resolution of wheezing.        # Chronic obstructive pulmonary disease  : Previously diagnosed   Plan  - Continue home medication  - Trilogy equivalent   - Albuterol     # DANG  : Previously diagnosed   Plan:;   - Home CPAP, otherwise BiPAP     Torri Joya MD

## 2024-07-19 NOTE — CARE PLAN
Problem: Fall/Injury  Goal: Not fall by end of shift  Outcome: Progressing  Goal: Be free from injury by end of the shift  Outcome: Progressing  Goal: Verbalize understanding of personal risk factors for fall in the hospital  Outcome: Progressing  Goal: Verbalize understanding of risk factor reduction measures to prevent injury from fall in the home  Outcome: Progressing  Goal: Use assistive devices by end of the shift  Outcome: Progressing  Goal: Pace activities to prevent fatigue by end of the shift  Outcome: Progressing     Problem: Pain  Goal: Takes deep breaths with improved pain control throughout the shift  Outcome: Progressing  Goal: Turns in bed with improved pain control throughout the shift  Outcome: Progressing  Goal: Walks with improved pain control throughout the shift  Outcome: Progressing  Goal: Performs ADL's with improved pain control throughout shift  Outcome: Progressing  Goal: Participates in PT with improved pain control throughout the shift  Outcome: Progressing  Goal: Free from opioid side effects throughout the shift  Outcome: Progressing  Goal: Free from acute confusion related to pain meds throughout the shift  Outcome: Progressing     Problem: Pain - Adult  Goal: Verbalizes/displays adequate comfort level or baseline comfort level  Outcome: Progressing     Problem: Safety - Adult  Goal: Free from fall injury  Outcome: Progressing     Problem: Discharge Planning  Goal: Discharge to home or other facility with appropriate resources  Outcome: Progressing     Problem: Chronic Conditions and Co-morbidities  Goal: Patient's chronic conditions and co-morbidity symptoms are monitored and maintained or improved  Outcome: Progressing     Problem: Skin  Goal: Decreased wound size/increased tissue granulation at next dressing change  Outcome: Progressing  Goal: Participates in plan/prevention/treatment measures  Outcome: Progressing  Goal: Prevent/manage excess moisture  Outcome: Progressing  Goal:  Prevent/minimize sheer/friction injuries  Outcome: Progressing  Goal: Promote/optimize nutrition  Outcome: Progressing  Goal: Promote skin healing  Outcome: Progressing       The clinical goals for the shift include free from falls and injury

## 2024-07-20 ENCOUNTER — APPOINTMENT (OUTPATIENT)
Dept: RADIOLOGY | Facility: HOSPITAL | Age: 74
DRG: 493 | End: 2024-07-20
Payer: MEDICARE

## 2024-07-20 PROCEDURE — 94640 AIRWAY INHALATION TREATMENT: CPT

## 2024-07-20 PROCEDURE — 2500000001 HC RX 250 WO HCPCS SELF ADMINISTERED DRUGS (ALT 637 FOR MEDICARE OP): Performed by: STUDENT IN AN ORGANIZED HEALTH CARE EDUCATION/TRAINING PROGRAM

## 2024-07-20 PROCEDURE — 2500000001 HC RX 250 WO HCPCS SELF ADMINISTERED DRUGS (ALT 637 FOR MEDICARE OP)

## 2024-07-20 PROCEDURE — 1100000001 HC PRIVATE ROOM DAILY

## 2024-07-20 PROCEDURE — 99233 SBSQ HOSP IP/OBS HIGH 50: CPT | Performed by: STUDENT IN AN ORGANIZED HEALTH CARE EDUCATION/TRAINING PROGRAM

## 2024-07-20 PROCEDURE — 2500000004 HC RX 250 GENERAL PHARMACY W/ HCPCS (ALT 636 FOR OP/ED): Performed by: STUDENT IN AN ORGANIZED HEALTH CARE EDUCATION/TRAINING PROGRAM

## 2024-07-20 PROCEDURE — 2500000002 HC RX 250 W HCPCS SELF ADMINISTERED DRUGS (ALT 637 FOR MEDICARE OP, ALT 636 FOR OP/ED): Performed by: STUDENT IN AN ORGANIZED HEALTH CARE EDUCATION/TRAINING PROGRAM

## 2024-07-20 RX ORDER — ALBUTEROL SULFATE 0.83 MG/ML
2.5 SOLUTION RESPIRATORY (INHALATION) EVERY 6 HOURS
Status: DISCONTINUED | OUTPATIENT
Start: 2024-07-20 | End: 2024-07-22

## 2024-07-20 SDOH — ECONOMIC STABILITY: TRANSPORTATION INSECURITY
IN THE PAST 12 MONTHS, HAS THE LACK OF TRANSPORTATION KEPT YOU FROM MEDICAL APPOINTMENTS OR FROM GETTING MEDICATIONS?: NO

## 2024-07-20 SDOH — ECONOMIC STABILITY: GENERAL

## 2024-07-20 SDOH — ECONOMIC STABILITY: FOOD INSECURITY: WITHIN THE PAST 12 MONTHS, YOU WORRIED THAT YOUR FOOD WOULD RUN OUT BEFORE YOU GOT MONEY TO BUY MORE.: OFTEN TRUE

## 2024-07-20 SDOH — ECONOMIC STABILITY: HOUSING INSECURITY

## 2024-07-20 SDOH — ECONOMIC STABILITY: FOOD INSECURITY: WITHIN THE PAST 12 MONTHS, YOU WORRIED THAT YOUR FOOD WOULD RUN OUT BEFORE YOU GOT THE MONEY TO BUY MORE.: OFTEN TRUE

## 2024-07-20 SDOH — ECONOMIC STABILITY: FOOD INSECURITY: WITHIN THE PAST 12 MONTHS, THE FOOD YOU BOUGHT JUST DIDN'T LAST AND YOU DIDN'T HAVE MONEY TO GET MORE.: OFTEN TRUE

## 2024-07-20 SDOH — ECONOMIC STABILITY: HOUSING INSECURITY
IN THE PAST 12 MONTHS HAS THE ELECTRIC, GAS, OIL, OR WATER COMPANY THREATENED TO SHUT OFF SERVICES IN YOUR HOME?: PATIENT DECLINED

## 2024-07-20 SDOH — ECONOMIC STABILITY: TRANSPORTATION INSECURITY
IN THE PAST 12 MONTHS, HAS LACK OF TRANSPORTATION KEPT YOU FROM MEETINGS, WORK, OR FROM GETTING THINGS NEEDED FOR DAILY LIVING?: YES

## 2024-07-20 SDOH — ECONOMIC STABILITY: HOUSING INSECURITY: IN THE LAST 12 MONTHS, WAS THERE A TIME WHEN YOU WERE NOT ABLE TO PAY THE MORTGAGE OR RENT ON TIME?: PATIENT DECLINED

## 2024-07-20 SDOH — ECONOMIC STABILITY: TRANSPORTATION INSECURITY: IN THE PAST 12 MONTHS, HAS LACK OF TRANSPORTATION KEPT YOU FROM MEDICAL APPOINTMENTS OR FROM GETTING MEDICATIONS?: NO

## 2024-07-20 SDOH — ECONOMIC STABILITY: INCOME INSECURITY: IN THE LAST 12 MONTHS, WAS THERE A TIME WHEN YOU WERE NOT ABLE TO PAY THE MORTGAGE OR RENT ON TIME?: PATIENT DECLINED

## 2024-07-20 SDOH — ECONOMIC STABILITY: FOOD INSECURITY

## 2024-07-20 SDOH — ECONOMIC STABILITY: TRANSPORTATION INSECURITY

## 2024-07-20 ASSESSMENT — COGNITIVE AND FUNCTIONAL STATUS - GENERAL
HELP NEEDED FOR BATHING: A LITTLE
TURNING FROM BACK TO SIDE WHILE IN FLAT BAD: A LITTLE
PERSONAL GROOMING: A LITTLE
EATING MEALS: A LITTLE
WALKING IN HOSPITAL ROOM: A LITTLE
MOVING FROM LYING ON BACK TO SITTING ON SIDE OF FLAT BED WITH BEDRAILS: A LITTLE
TOILETING: A LITTLE
STANDING UP FROM CHAIR USING ARMS: A LITTLE
DAILY ACTIVITIY SCORE: 18
DRESSING REGULAR UPPER BODY CLOTHING: A LITTLE
MOVING TO AND FROM BED TO CHAIR: A LITTLE
MOBILITY SCORE: 18
CLIMB 3 TO 5 STEPS WITH RAILING: A LITTLE
DRESSING REGULAR LOWER BODY CLOTHING: A LITTLE

## 2024-07-20 ASSESSMENT — PAIN SCALES - GENERAL: PAINLEVEL_OUTOF10: 5 - MODERATE PAIN

## 2024-07-20 ASSESSMENT — ACTIVITIES OF DAILY LIVING (ADL): LACK_OF_TRANSPORTATION: YES

## 2024-07-20 ASSESSMENT — SOCIAL DETERMINANTS OF HEALTH (SDOH)
IN THE PAST 12 MONTHS, HAS THE ELECTRIC, GAS, OIL, OR WATER COMPANY THREATENED TO SHUT OFF SERVICE IN YOUR HOME?: PATIENT DECLINED

## 2024-07-20 ASSESSMENT — PAIN - FUNCTIONAL ASSESSMENT: PAIN_FUNCTIONAL_ASSESSMENT: 0-10

## 2024-07-20 NOTE — H&P
[facility] Department of Orthopaedic Surgery   Surgical History & Physical <30 Days    Reason for Surgery: L pilon fx, L lateral mal fx  Planned Procedure: Removal of ex-fix, hindfoot fusion nail L ankle    History & Physical Reviewed:  I have reviewed the History and Physical within 30 days dated 7/16/24. Relevant findings and updates are noted below:  No significant changes.    Home medications were reviewed with significant updates noted below:  No significant changes.    ERAS patient?: No    COVID-19 Risk Consent:   Surgeon has reviewed the key risks related to yolanda COVID-19 and subsequent sequelae.     07/20/24 at 12:22 AM - Cortes Iraheta MD

## 2024-07-20 NOTE — CARE PLAN
The patient's goals for the shift include get sleep    The clinical goals for the shift include pt will remain safe and free from harm      Problem: Fall/Injury  Goal: Not fall by end of shift  Outcome: Progressing  Goal: Be free from injury by end of the shift  Outcome: Progressing  Goal: Verbalize understanding of personal risk factors for fall in the hospital  Outcome: Progressing  Goal: Verbalize understanding of risk factor reduction measures to prevent injury from fall in the home  Outcome: Progressing  Goal: Use assistive devices by end of the shift  Outcome: Progressing  Goal: Pace activities to prevent fatigue by end of the shift  Outcome: Progressing     Problem: Pain  Goal: Takes deep breaths with improved pain control throughout the shift  Outcome: Progressing  Goal: Turns in bed with improved pain control throughout the shift  Outcome: Progressing  Goal: Walks with improved pain control throughout the shift  Outcome: Progressing  Goal: Performs ADL's with improved pain control throughout shift  Outcome: Progressing  Goal: Participates in PT with improved pain control throughout the shift  Outcome: Progressing  Goal: Free from opioid side effects throughout the shift  Outcome: Progressing  Goal: Free from acute confusion related to pain meds throughout the shift  Outcome: Progressing     Problem: Skin  Goal: Decreased wound size/increased tissue granulation at next dressing change  Outcome: Progressing  Goal: Participates in plan/prevention/treatment measures  Outcome: Progressing  Goal: Prevent/manage excess moisture  Outcome: Progressing  Goal: Prevent/minimize sheer/friction injuries  Outcome: Progressing  Goal: Promote/optimize nutrition  Outcome: Progressing  Goal: Promote skin healing  Outcome: Progressing     Problem: Chronic Conditions and Co-morbidities  Goal: Patient's chronic conditions and co-morbidity symptoms are monitored and maintained or improved  Outcome: Progressing     Problem:  Discharge Planning  Goal: Discharge to home or other facility with appropriate resources  Outcome: Progressing     Problem: Safety - Adult  Goal: Free from fall injury  Outcome: Progressing

## 2024-07-20 NOTE — PROGRESS NOTES
"Renee Lombardo is a 73 y.o. female on day 5 of admission presenting with Closed left ankle fracture, initial encounter.    Subjective   Patient seen and examined at bedside this am.  Wheezing improved, no new complaints but does report mild congestion.  She does not quite feel back to baseline, but feels much better than yesterday.       Objective   General: NAD  HENT: atraumatic, normocephalic  Respiratory: CTAB, no excessive respiratory effort on NC  Cardio: RRR, no mrg  GI: NT/ND  Extremities: RLE WWP with no edema (SCD in place), LLL wrapped and elevated  Neuro: CN intact  Psych: cooperative     Last Recorded Vitals  Blood pressure 117/77, pulse 71, temperature 36.6 °C (97.9 °F), temperature source Temporal, resp. rate 16, height 1.448 m (4' 9.01\"), weight 65.8 kg (145 lb), SpO2 94%.  Intake/Output last 3 Shifts:  I/O last 3 completed shifts:  In: - (0 mL/kg)   Out: 1000 (15.2 mL/kg) [Urine:1000 (0.4 mL/kg/hr)]  Weight: 65.8 kg     Relevant Results  XR chest 1 view    Result Date: 7/19/2024  Interpreted By:  Alexx Schultz and Gupta Jayesh STUDY: XR CHEST 1 VIEW;  7/19/2024 1:08 pm   INDICATION: Signs/Symptoms:COPD exascerbation.   COMPARISON: Comparison radiograph 07/16/2024.   ACCESSION NUMBER(S): [id number]   ORDERING CLINICIAN: JUAN SUAREZ   FINDINGS: AP radiograph of the chest was provided.   Relevant clinical history: COPD exacerbation   Medical devices:None.   CARDIOMEDIASTINAL SILHOUETTE: Cardiomediastinal silhouette is enlarged but stable in size and configuration.   LUNGS: Bilateral low lung volumes. Linear opacities in bibasilar lungs representing atelectasis. Stable increased central pulmonary vascularity mild interstitial prominence in the right and left chest. No other consolidation, pleural effusion, or pneumothorax. Chronic changes noted.   ABDOMEN: No upper abdominal findings.   BONES: No osseous changes.       1. Similar bibasilar atelectasis. No other consolidation, effusion, or " pneumothorax.   MACRO: I personally reviewed the images/study and I agree with the findings as stated by Anirudh Jaimes MD. This study was interpreted at [facility], [place], [place].   Signed by: Alexx Schultz 7/19/2024 4:52 PM Dictation workstation:   [id number]    ECG 12 Lead    Result Date: 7/19/2024  Normal sinus rhythm Low voltage QRS Septal infarct , age undetermined Abnormal ECG      Assessment/Plan   73 y.o. female presenting with PMH chronic hypoxemic respiratory failure (on home O2 2 LPM via NC with exertion and at nighttime), COPD secondary to tobacco use, bronchiectasis, sub-centimeter pulmonary nodules, mild to moderate obstructive sleep apnea (on BIPAP at nighttime), chronic allergic rhinitis, HFpEF, anxiety, HTN, GERD, hypothyroidism, currently admitted with a L ankle fracture.  Pulmonary is consulted to assist with management of COPD exacerbation noticed in PACU prior to OR on 7/19.  She has been treated with duonebs, steroids, and restarted on home ICS/LABA with nocturnal BiPAP with significant improvement.    While there remains a modest risk to proceeding with intubation in the recovery phase of a COPD exacerbation, there is also risk to delaying surgery much further with respect to optimal recovery of her acute fracture.  She is clinically significantly improved compared to our exam yesterday, and currently the benefit of proceeding with surgery likely outweighs the small risk of postoperative bronchospasm/worsened atelectasis.    Recommendations:  -Continue spiriva and breo-ellipta (on home trelegy)  -Switched to albuterol nebs q6h scheduled in the perioperative period  -Orders placed for nocturnal BiPAP (12/8, 2L NC bleed in), may require post-operatively  -Started prednisone taper on 7/19: 30mg x 4days, 20mg x 4days, followed by 10mg x4 days  -Will order a CT chest without contrast post-operative to reassess the R lung base atelectasis identified on 9/2023 CT      Patient seen, examined, and discussed with Dr. Irby.    Tish Gresham MD

## 2024-07-20 NOTE — CARE PLAN
Problem: Fall/Injury  Goal: Not fall by end of shift  Outcome: Progressing  Goal: Be free from injury by end of the shift  Outcome: Progressing  Goal: Verbalize understanding of personal risk factors for fall in the hospital  Outcome: Progressing  Goal: Verbalize understanding of risk factor reduction measures to prevent injury from fall in the home  Outcome: Progressing  Goal: Use assistive devices by end of the shift  Outcome: Progressing  Goal: Pace activities to prevent fatigue by end of the shift  Outcome: Progressing     Problem: Pain  Goal: Takes deep breaths with improved pain control throughout the shift  Outcome: Progressing  Goal: Turns in bed with improved pain control throughout the shift  Outcome: Progressing  Goal: Walks with improved pain control throughout the shift  Outcome: Progressing  Goal: Performs ADL's with improved pain control throughout shift  Outcome: Progressing  Goal: Participates in PT with improved pain control throughout the shift  Outcome: Progressing  Goal: Free from opioid side effects throughout the shift  Outcome: Progressing  Goal: Free from acute confusion related to pain meds throughout the shift  Outcome: Progressing     Problem: Pain - Adult  Goal: Verbalizes/displays adequate comfort level or baseline comfort level  Outcome: Progressing     Problem: Safety - Adult  Goal: Free from fall injury  Outcome: Progressing     Problem: Discharge Planning  Goal: Discharge to home or other facility with appropriate resources  Outcome: Progressing     Problem: Chronic Conditions and Co-morbidities  Goal: Patient's chronic conditions and co-morbidity symptoms are monitored and maintained or improved  Outcome: Progressing     Problem: Skin  Goal: Decreased wound size/increased tissue granulation at next dressing change  Outcome: Progressing  Goal: Participates in plan/prevention/treatment measures  Outcome: Progressing  Goal: Prevent/manage excess moisture  Outcome: Progressing  Goal:  Prevent/minimize sheer/friction injuries  Outcome: Progressing  Goal: Promote/optimize nutrition  Outcome: Progressing  Goal: Promote skin healing  Outcome: Progressing       The clinical goals for the shift include remain free from falls and injury

## 2024-07-20 NOTE — PROGRESS NOTES
Orthopaedic Surgery Progress Note    Subjective:  Resting comfortably in bed this AM. Leg elevated. Received multiple breathing treatments throughout the day, on 2L O2. Pulmonology consulted yesterday. Pain controlled. Denies N/V. Endorsing some SOB on her baseline COPD. Denies N/T.    Objective:  Vitals:    07/20/24 0747   BP: 117/77   Pulse: 71   Resp: 16   Temp: 36.6 °C (97.9 °F)   SpO2: 94%     Physical Exam  - Constitutional: No acute distress, cooperative  - Eyes: EOM grossly intact  - Head/Neck: Trachea midline  - Respiratory/Thorax: NWOB  - Cardiovascular: RRR on peripheral palpation  - Gastrointestinal: Nondistended  - Psychological: Appropriate mood/behavior  - Skin: Warm and dry. Additional findings in musculoskeletal evaluation  - Musculoskeletal:  ---  Left lower extremity:  - Ex-fix w dressing C/D/I  - Compartments soft and compressible  - Fires TA/GS/EHL  - SILT in Miller/Sa/SP/DP/T distribution  - Palpable DP pulse      No results found for this or any previous visit (from the past 24 hour(s)).      XR chest 1 view   Final Result   1. Similar bibasilar atelectasis. No other consolidation, effusion,   or pneumothorax.        MACRO:   I personally reviewed the images/study and I agree with the findings   as stated by Anirudh Jaimes MD. This study was interpreted at   [place], [place].        Signed by: Alexx Schultz 7/19/2024 4:52 PM   Dictation workstation:   [id number]      CT ankle left wo IV contrast   Final Result   1. Interval external fixation of comminuted distal tibial pilon   fracture and distal fibular fracture with similar alignment from the   prior.   2. Small intra-articular body in the ankle joint laterally        I personally reviewed the images/study and I agree with the findings   as stated by Dr. Balaji Tapia. This study was interpreted at [place], [place].        MACRO:   None        Signed by:  Zurdo Chino 7/16/2024 6:57 PM   Dictation workstation:   [id number]      FL fluoro images no charge   Final Result      Lower extremity venous duplex bilateral   Final Result      Transthoracic Echo (TTE) Complete   Final Result      XR ankle left 3+ views   Final Result   Status post splinting of impacted distal tibial and fibular fractures   without significant change in alignment when compared to prior.        I personally reviewed the images/study and I agree with the findings   as stated by Marco A Lucas MD (Radiology Resident).   This study was interpreted at [place], [place].        MACRO:   None        Signed by: Kiesha Ordonez 7/16/2024 3:14 PM   Dictation workstation:   [id number]      XR chest 1 view   Final Result   1.  Atelectasis within the right mid lung and bilateral lung bases.   No consolidation, pleural effusion or pneumothorax.        I personally reviewed the images/study and I agree with the findings   as stated by Marco A Lucas MD (Radiology Resident).   This study was interpreted at [place], [place].        MACRO:   None        Signed by: Alexx Schultz 7/16/2024 10:20 AM   Dictation workstation:   [id number]      FL fluoro images no charge    (Results Pending)   FL less than 1 hour    (Results Pending)     Assessment:  73F (COPD on home O2, DANG, CHF, HTN) direct admit from [facility] p/w above after mGLF. Closed, NVI. CT w large posterior mal, small anterior cortical defect. Now s/p L ankle-spanning Ex-Fix with Dr. Mann.     Plan:  - Weight-bearing status: NWB LLE  - Tentative OR today w/ Dr. Vasquez for L hindfoot fusion nail, 7/19  - Post ex-fix CT L ankle complete  - Embolic ppx: Lovenox, hold @ midnight 7/19  - Feeding: Regular diet as tolerated, bowel regimen  - NPO for tentative OR today  - Analgesia: Multimodal  - Volume: mIV @ at  cc/hr, HLIVF  when tolerating PO, monitor BMP  - OT/PT: Consulted  - Respiratory: Encourage IS, maintain O2 sat >92%  - Infection: Madai-operative Ancef for 24 hours, afebrile   - Lines: Maintain PIVx2 while inpatient  - Drains: N  - Rhodes: N  - Transfusion: Trend CBC, no indication for transfusion  - Cardiac: No issues  - Glycemic: No issues  - C/w home medications - home COPD medications and breathing treatments  - Appreciate pulmonology recs  - Dispo pending PT, pain control    This patient will be followed by the Orthopaedic Trauma service. Please page or Epic Chat the corresponding residents below with questions or concerns.     This patient will be followed by the Orthopaedic Trauma service. Please page or Epic Chat the corresponding residents below with questions or concerns.     Ortho Trauma Service (Epic Chat Preferred)  First call: Félix Lazo MD PGY-1  First call: Fortunato Goss MD PGY-1  Second call: Renzo Samuels PGY-2  Third call: Donte Zaragoza PGY-3    6pm-6am M-F, Holidays, and weekends page Ortho on-call @[phone] with urgent questions/concerns.     Renzo Samuels MD  Orthopaedic Surgery PGY-1  On-call pager [phone]

## 2024-07-21 ENCOUNTER — APPOINTMENT (OUTPATIENT)
Dept: RADIOLOGY | Facility: HOSPITAL | Age: 74
DRG: 493 | End: 2024-07-21
Payer: MEDICARE

## 2024-07-21 ENCOUNTER — APPOINTMENT (OUTPATIENT)
Dept: CARDIOLOGY | Facility: HOSPITAL | Age: 74
DRG: 493 | End: 2024-07-21
Payer: MEDICARE

## 2024-07-21 VITALS
HEIGHT: 57 IN | WEIGHT: 145 LBS | OXYGEN SATURATION: 92 % | HEART RATE: 91 BPM | DIASTOLIC BLOOD PRESSURE: 83 MMHG | RESPIRATION RATE: 17 BRPM | SYSTOLIC BLOOD PRESSURE: 120 MMHG | TEMPERATURE: 96.8 F | BODY MASS INDEX: 31.28 KG/M2

## 2024-07-21 LAB
ABO GROUP (TYPE) IN BLOOD: NORMAL
ANION GAP SERPL CALC-SCNC: 18 MMOL/L (ref 10–20)
ANTIBODY SCREEN: NORMAL
APTT PPP: 31 SECONDS (ref 27–38)
BASOPHILS # BLD AUTO: 0.05 X10*3/UL (ref 0–0.1)
BASOPHILS NFR BLD AUTO: 0.8 %
BUN SERPL-MCNC: 37 MG/DL (ref 6–23)
CALCIUM SERPL-MCNC: 9.7 MG/DL (ref 8.6–10.6)
CHLORIDE SERPL-SCNC: 103 MMOL/L (ref 98–107)
CO2 SERPL-SCNC: 22 MMOL/L (ref 21–32)
CREAT SERPL-MCNC: 0.69 MG/DL (ref 0.5–1.05)
EGFRCR SERPLBLD CKD-EPI 2021: >90 ML/MIN/1.73M*2
EOSINOPHIL # BLD AUTO: 0.08 X10*3/UL (ref 0–0.4)
EOSINOPHIL NFR BLD AUTO: 1.3 %
ERYTHROCYTE [DISTWIDTH] IN BLOOD BY AUTOMATED COUNT: 15.9 % (ref 11.5–14.5)
GLUCOSE SERPL-MCNC: 116 MG/DL (ref 74–99)
HCT VFR BLD AUTO: 38.2 % (ref 36–46)
HGB BLD-MCNC: 11.4 G/DL (ref 12–16)
IMM GRANULOCYTES # BLD AUTO: 0.04 X10*3/UL (ref 0–0.5)
IMM GRANULOCYTES NFR BLD AUTO: 0.7 % (ref 0–0.9)
INR PPP: 1 (ref 0.9–1.1)
LYMPHOCYTES # BLD AUTO: 1.66 X10*3/UL (ref 0.8–3)
LYMPHOCYTES NFR BLD AUTO: 27.4 %
MCH RBC QN AUTO: 24.1 PG (ref 26–34)
MCHC RBC AUTO-ENTMCNC: 29.8 G/DL (ref 32–36)
MCV RBC AUTO: 81 FL (ref 80–100)
MONOCYTES # BLD AUTO: 0.35 X10*3/UL (ref 0.05–0.8)
MONOCYTES NFR BLD AUTO: 5.8 %
NEUTROPHILS # BLD AUTO: 3.88 X10*3/UL (ref 1.6–5.5)
NEUTROPHILS NFR BLD AUTO: 64 %
NRBC BLD-RTO: 0 /100 WBCS (ref 0–0)
PLATELET # BLD AUTO: 371 X10*3/UL (ref 150–450)
POTASSIUM SERPL-SCNC: 4.2 MMOL/L (ref 3.5–5.3)
PROTHROMBIN TIME: 11.3 SECONDS (ref 9.8–12.8)
RBC # BLD AUTO: 4.74 X10*6/UL (ref 4–5.2)
RH FACTOR (ANTIGEN D): NORMAL
SODIUM SERPL-SCNC: 139 MMOL/L (ref 136–145)
WBC # BLD AUTO: 6.1 X10*3/UL (ref 4.4–11.3)

## 2024-07-21 PROCEDURE — 86850 RBC ANTIBODY SCREEN: CPT

## 2024-07-21 PROCEDURE — 2500000001 HC RX 250 WO HCPCS SELF ADMINISTERED DRUGS (ALT 637 FOR MEDICARE OP)

## 2024-07-21 PROCEDURE — 93005 ELECTROCARDIOGRAM TRACING: CPT

## 2024-07-21 PROCEDURE — 2500000002 HC RX 250 W HCPCS SELF ADMINISTERED DRUGS (ALT 637 FOR MEDICARE OP, ALT 636 FOR OP/ED): Performed by: STUDENT IN AN ORGANIZED HEALTH CARE EDUCATION/TRAINING PROGRAM

## 2024-07-21 PROCEDURE — 80048 BASIC METABOLIC PNL TOTAL CA: CPT

## 2024-07-21 PROCEDURE — 2500000004 HC RX 250 GENERAL PHARMACY W/ HCPCS (ALT 636 FOR OP/ED)

## 2024-07-21 PROCEDURE — 85610 PROTHROMBIN TIME: CPT

## 2024-07-21 PROCEDURE — 85025 COMPLETE CBC W/AUTO DIFF WBC: CPT

## 2024-07-21 PROCEDURE — 93010 ELECTROCARDIOGRAM REPORT: CPT | Performed by: INTERNAL MEDICINE

## 2024-07-21 PROCEDURE — 99232 SBSQ HOSP IP/OBS MODERATE 35: CPT | Performed by: STUDENT IN AN ORGANIZED HEALTH CARE EDUCATION/TRAINING PROGRAM

## 2024-07-21 PROCEDURE — 2500000004 HC RX 250 GENERAL PHARMACY W/ HCPCS (ALT 636 FOR OP/ED): Performed by: STUDENT IN AN ORGANIZED HEALTH CARE EDUCATION/TRAINING PROGRAM

## 2024-07-21 PROCEDURE — 94640 AIRWAY INHALATION TREATMENT: CPT

## 2024-07-21 PROCEDURE — 1100000001 HC PRIVATE ROOM DAILY

## 2024-07-21 PROCEDURE — 36415 COLL VENOUS BLD VENIPUNCTURE: CPT

## 2024-07-21 PROCEDURE — 71045 X-RAY EXAM CHEST 1 VIEW: CPT

## 2024-07-21 RX ORDER — ENOXAPARIN SODIUM 100 MG/ML
40 INJECTION SUBCUTANEOUS ONCE
Status: COMPLETED | OUTPATIENT
Start: 2024-07-21 | End: 2024-07-21

## 2024-07-21 RX ORDER — ENOXAPARIN SODIUM 100 MG/ML
40 INJECTION SUBCUTANEOUS 2 TIMES DAILY
Status: DISCONTINUED | OUTPATIENT
Start: 2024-07-21 | End: 2024-07-21

## 2024-07-21 RX ORDER — ACETAMINOPHEN 500 MG
5 TABLET ORAL NIGHTLY
Status: DISCONTINUED | OUTPATIENT
Start: 2024-07-21 | End: 2024-07-29 | Stop reason: HOSPADM

## 2024-07-21 ASSESSMENT — COGNITIVE AND FUNCTIONAL STATUS - GENERAL
DRESSING REGULAR UPPER BODY CLOTHING: A LITTLE
STANDING UP FROM CHAIR USING ARMS: A LITTLE
MOVING FROM LYING ON BACK TO SITTING ON SIDE OF FLAT BED WITH BEDRAILS: A LITTLE
PERSONAL GROOMING: A LITTLE
MOBILITY SCORE: 18
HELP NEEDED FOR BATHING: A LITTLE
TURNING FROM BACK TO SIDE WHILE IN FLAT BAD: A LITTLE
WALKING IN HOSPITAL ROOM: A LITTLE
DRESSING REGULAR LOWER BODY CLOTHING: A LITTLE
CLIMB 3 TO 5 STEPS WITH RAILING: A LITTLE
TOILETING: A LITTLE
EATING MEALS: A LITTLE
MOVING TO AND FROM BED TO CHAIR: A LITTLE
DAILY ACTIVITIY SCORE: 18

## 2024-07-21 ASSESSMENT — PAIN - FUNCTIONAL ASSESSMENT: PAIN_FUNCTIONAL_ASSESSMENT: 0-10

## 2024-07-21 ASSESSMENT — PAIN SCALES - GENERAL
PAINLEVEL_OUTOF10: 0 - NO PAIN
PAINLEVEL_OUTOF10: 4

## 2024-07-21 NOTE — PROGRESS NOTES
Orthopaedic Surgery Progress Note    Subjective:  Resting comfortably in bed this AM. Leg elevated with improved swelling. Respiratory status has improved - appeciate recs from pulmonology. Pain controlled. Denies N/V. Denies N/T.    Objective:  Vitals:    07/21/24 0759   BP: 127/79   Pulse: 77   Resp: 18   Temp: 36.1 °C (97 °F)   SpO2: 93%     Physical Exam  - Constitutional: No acute distress, cooperative  - Eyes: EOM grossly intact  - Head/Neck: Trachea midline  - Respiratory/Thorax: NWOB  - Cardiovascular: RRR on peripheral palpation  - Gastrointestinal: Nondistended  - Psychological: Appropriate mood/behavior  - Skin: Warm and dry. Additional findings in musculoskeletal evaluation  - Musculoskeletal:  ---  Left lower extremity:  - Ex-fix w dressing C/D/I  - Leg elevated  - Compartments soft and compressible  - Fires TA/GS/EHL  - SILT in Miller/Sa/SP/DP/T distribution  - Palpable DP pulse    No results found for this or any previous visit (from the past 24 hour(s)).      XR chest 1 view   Final Result   1. Similar bibasilar atelectasis. No other consolidation, effusion,   or pneumothorax.        MACRO:   I personally reviewed the images/study and I agree with the findings   as stated by Anirudh Jaimes MD. This study was interpreted at   [place], [place].        Signed by: Alexx Schultz 7/19/2024 4:52 PM   Dictation workstation:   [id number]      CT ankle left wo IV contrast   Final Result   1. Interval external fixation of comminuted distal tibial pilon   fracture and distal fibular fracture with similar alignment from the   prior.   2. Small intra-articular body in the ankle joint laterally        I personally reviewed the images/study and I agree with the findings   as stated by Dr. Balaji Tapia. This study was interpreted at [place], [place].        MACRO:   None        Signed by: Zurdo Chino 7/16/2024 6:57 PM    Dictation workstation:   [id number]      FL fluoro images no charge   Final Result      Lower extremity venous duplex bilateral   Final Result      Transthoracic Echo (TTE) Complete   Final Result      XR ankle left 3+ views   Final Result   Status post splinting of impacted distal tibial and fibular fractures   without significant change in alignment when compared to prior.        I personally reviewed the images/study and I agree with the findings   as stated by Marco A Lucas MD (Radiology Resident).   This study was interpreted at [place], [place].        MACRO:   None        Signed by: Kiesha Ordonez 7/16/2024 3:14 PM   Dictation workstation:   [id number]      XR chest 1 view   Final Result   1.  Atelectasis within the right mid lung and bilateral lung bases.   No consolidation, pleural effusion or pneumothorax.        I personally reviewed the images/study and I agree with the findings   as stated by Marco A Lucas MD (Radiology Resident).   This study was interpreted at [place], [place].        MACRO:   None        Signed by: Alexx Schultz 7/16/2024 10:20 AM   Dictation workstation:   [id number]      FL less than 1 hour    (Results Pending)   XR chest 1 view    (Results Pending)     Assessment:  73F (COPD on home O2, DANG, CHF, HTN) direct admit from [facility] p/w above after mGLF. Closed, NVI. CT w large posterior mal, small anterior cortical defect. Now s/p L ankle-spanning Ex-Fix with Dr. Mann.     Plan:  - Weight-bearing status: NWB LLE  - Tentative OR today w/ Dr. Vasquez for L hindfoot fusion nail, 7/22. Discussed with patient that due to OR availability, her case will need to be delayed until 7/22 at the earliest.   - Post ex-fix CT L ankle complete  - Embolic ppx: Lovenox, hold @ midnight 7/19  - Feeding: Regular diet as tolerated, bowel regimen  - NPO at midnight for OR  7/22  - Analgesia: Multimodal  - Volume: mIV @ at  cc/hr, HLIVF when tolerating PO, monitor BMP  - OT/PT: Consulted  - Respiratory: Encourage IS, maintain O2 sat >92%  - Infection: Madai-operative Ancef for 24 hours, afebrile   - Lines: Maintain PIVx2 while inpatient  - Drains: N  - Rhodes: N  - Transfusion: Trend CBC, no indication for transfusion  - Cardiac: No issues  - Glycemic: No issues  - C/w home medications - home COPD medications and breathing treatments  - Appreciate pulmonology recs  - Dispo pending PT, pain control    This patient will be followed by the Orthopaedic Trauma service. Please page or Epic Chat the corresponding residents below with questions or concerns.     This patient will be followed by the Orthopaedic Trauma service. Please page or Epic Chat the corresponding residents below with questions or concerns.     Ortho Trauma Service (Epic Chat Preferred)  First call: Félix Lazo MD PGY-1  First call: Fortunato Goss MD PGY-1  Second call: Renzo Samuels PGY-2  Third call: Donte Zaragoza PGY-3    6pm-6am M-F, Holidays, and weekends page Ortho on-call @[phone] with urgent questions/concerns.     Renzo Samuels MD  Orthopaedic Surgery PGY-1  On-call pager [phone]

## 2024-07-21 NOTE — PROGRESS NOTES
"Renee Lombardo is a 73 y.o. female on day 6 of admission presenting with Closed left ankle fracture, initial encounter.    Subjective   Patient reports feeling well although having some difficulty sleeping, asking for melatonin.  She is unsure whether the surgical plan is for today or tomorrow.        Objective   General: NAD  HENT: atraumatic, normocephalic  Respiratory: CTAB, no excessive respiratory effort on 2L NC  Cardio: RRR, no mrg  GI: NT/ND  Extremities: RLE WWP with no edema (SCD in place), LLL wrapped and elevated  Neuro: CN intact  Psych: cooperative     Last Recorded Vitals  Blood pressure 127/79, pulse 77, temperature 36.1 °C (97 °F), temperature source Temporal, resp. rate 18, height 1.448 m (4' 9.01\"), weight 65.8 kg (145 lb), SpO2 93%.  Intake/Output last 3 Shifts:  I/O last 3 completed shifts:  In: - (0 mL/kg)   Out: 600 (9.1 mL/kg) [Urine:600 (0.3 mL/kg/hr)]  Weight: 65.8 kg     Relevant Results  XR chest 1 view    Result Date: 7/19/2024  Interpreted By:  Alexx Schultz and Gupta Jayesh STUDY: XR CHEST 1 VIEW;  7/19/2024 1:08 pm   INDICATION: Signs/Symptoms:COPD exascerbation.   COMPARISON: Comparison radiograph 07/16/2024.   ACCESSION NUMBER(S): [id number]   ORDERING CLINICIAN: JUAN SUAREZ   FINDINGS: AP radiograph of the chest was provided.   Relevant clinical history: COPD exacerbation   Medical devices:None.   CARDIOMEDIASTINAL SILHOUETTE: Cardiomediastinal silhouette is enlarged but stable in size and configuration.   LUNGS: Bilateral low lung volumes. Linear opacities in bibasilar lungs representing atelectasis. Stable increased central pulmonary vascularity mild interstitial prominence in the right and left chest. No other consolidation, pleural effusion, or pneumothorax. Chronic changes noted.   ABDOMEN: No upper abdominal findings.   BONES: No osseous changes.       1. Similar bibasilar atelectasis. No other consolidation, effusion, or pneumothorax.   MACRO: I personally reviewed " the images/study and I agree with the findings as stated by Anirudh Jaimes MD. This study was interpreted at [facility], [place], [place].   Signed by: Alexx Schultz 7/19/2024 4:52 PM Dictation workstation:   [id number]             Assessment/Plan   73 y.o. female presenting with PMH chronic hypoxemic respiratory failure (on home O2 2 LPM via NC with exertion and at nighttime), COPD secondary to tobacco use, bronchiectasis, sub-centimeter pulmonary nodules, mild to moderate obstructive sleep apnea (on BIPAP at nighttime), chronic allergic rhinitis, HFpEF, anxiety, HTN, GERD, hypothyroidism, currently admitted with a L ankle fracture.  Pulmonary is consulted to assist with management of COPD exacerbation noticed in PACU prior to OR on 7/19.  She has been treated with duonebs, steroids, and restarted on home ICS/LABA with nocturnal BiPAP with significant improvement.     While there remains a modest risk to proceeding with intubation in the recovery phase of a COPD exacerbation, there is also risk to delaying surgery much further with respect to optimal recovery of her acute fracture.  She is clinically significantly improved compared to our exam yesterday, and currently the benefit of proceeding with surgery likely outweighs the small risk of postoperative bronchospasm/worsened atelectasis.     Recommendations:  -Continue spiriva and breo-ellipta (on home trelegy)  -Switched to albuterol nebs q6h scheduled in the perioperative period  -Orders placed for nocturnal BiPAP (12/8, 2L NC bleed in), may require post-operatively  -Started prednisone taper on 7/19: 30mg x 4days, 20mg x 4days, followed by 10mg x4 days  -Will order a CT chest without contrast post-operative to reassess the R lung base atelectasis identified on 9/2023 CT      Patient seen, examined, and discussed with Dr. Irby.    Tish Gresham MD

## 2024-07-21 NOTE — CARE PLAN
The clinical goals for the shift include Patient will remain free from falls and injuries throughout shift        Problem: Fall/Injury  Goal: Not fall by end of shift  Outcome: Progressing  Goal: Be free from injury by end of the shift  Outcome: Progressing  Goal: Verbalize understanding of personal risk factors for fall in the hospital  Outcome: Progressing  Goal: Verbalize understanding of risk factor reduction measures to prevent injury from fall in the home  Outcome: Progressing  Goal: Use assistive devices by end of the shift  Outcome: Progressing  Goal: Pace activities to prevent fatigue by end of the shift  Outcome: Progressing     Problem: Pain  Goal: Takes deep breaths with improved pain control throughout the shift  Outcome: Progressing  Goal: Turns in bed with improved pain control throughout the shift  Outcome: Progressing  Goal: Walks with improved pain control throughout the shift  Outcome: Progressing  Goal: Performs ADL's with improved pain control throughout shift  Outcome: Progressing  Goal: Participates in PT with improved pain control throughout the shift  Outcome: Progressing  Goal: Free from opioid side effects throughout the shift  Outcome: Progressing  Goal: Free from acute confusion related to pain meds throughout the shift  Outcome: Progressing     Problem: Pain - Adult  Goal: Verbalizes/displays adequate comfort level or baseline comfort level  Outcome: Progressing     Problem: Safety - Adult  Goal: Free from fall injury  Outcome: Progressing     Problem: Discharge Planning  Goal: Discharge to home or other facility with appropriate resources  Outcome: Progressing     Problem: Chronic Conditions and Co-morbidities  Goal: Patient's chronic conditions and co-morbidity symptoms are monitored and maintained or improved  Outcome: Progressing     Problem: Skin  Goal: Decreased wound size/increased tissue granulation at next dressing change  Outcome: Progressing  Goal: Participates in  plan/prevention/treatment measures  Outcome: Progressing  Goal: Prevent/manage excess moisture  Outcome: Progressing  Goal: Prevent/minimize sheer/friction injuries  Outcome: Progressing  Goal: Promote/optimize nutrition  Outcome: Progressing  Goal: Promote skin healing  Outcome: Progressing

## 2024-07-22 ENCOUNTER — ANESTHESIA (OUTPATIENT)
Dept: OPERATING ROOM | Facility: HOSPITAL | Age: 74
End: 2024-07-22
Payer: MEDICARE

## 2024-07-22 ENCOUNTER — APPOINTMENT (OUTPATIENT)
Dept: RADIOLOGY | Facility: HOSPITAL | Age: 74
DRG: 493 | End: 2024-07-22
Payer: MEDICARE

## 2024-07-22 ENCOUNTER — ANESTHESIA EVENT (OUTPATIENT)
Dept: OPERATING ROOM | Facility: HOSPITAL | Age: 74
End: 2024-07-22
Payer: MEDICARE

## 2024-07-22 PROBLEM — D64.9 ANEMIA: Status: ACTIVE | Noted: 2024-07-22

## 2024-07-22 LAB
ANION GAP BLDA CALCULATED.4IONS-SCNC: 6 MMO/L (ref 10–25)
BASE EXCESS BLDA CALC-SCNC: 0.5 MMOL/L (ref -2–3)
BODY TEMPERATURE: 37 DEGREES CELSIUS
CA-I BLDA-SCNC: 1.25 MMOL/L (ref 1.1–1.33)
CHLORIDE BLDA-SCNC: 105 MMOL/L (ref 98–107)
GLUCOSE BLD MANUAL STRIP-MCNC: 154 MG/DL (ref 74–99)
GLUCOSE BLDA-MCNC: 160 MG/DL (ref 74–99)
HCO3 BLDA-SCNC: 28.9 MMOL/L (ref 22–26)
HCT VFR BLD EST: 32 % (ref 36–46)
HGB BLDA-MCNC: 10.8 G/DL (ref 12–16)
INHALED O2 CONCENTRATION: 100 %
LACTATE BLDA-SCNC: 0.9 MMOL/L (ref 0.4–2)
OXYHGB MFR BLDA: 89.7 % (ref 94–98)
PCO2 BLDA: 66 MM HG (ref 38–42)
PH BLDA: 7.25 PH (ref 7.38–7.42)
PO2 BLDA: 69 MM HG (ref 85–95)
POTASSIUM BLDA-SCNC: 3.5 MMOL/L (ref 3.5–5.3)
SAO2 % BLDA: 90 % (ref 94–100)
SODIUM BLDA-SCNC: 136 MMOL/L (ref 136–145)

## 2024-07-22 PROCEDURE — 20694 RMVL EXT FIXJ SYS UNDER ANES: CPT | Performed by: ORTHOPAEDIC SURGERY

## 2024-07-22 PROCEDURE — 3700000002 HC GENERAL ANESTHESIA TIME - EACH INCREMENTAL 1 MINUTE: Performed by: ORTHOPAEDIC SURGERY

## 2024-07-22 PROCEDURE — C1776 JOINT DEVICE (IMPLANTABLE): HCPCS | Performed by: ORTHOPAEDIC SURGERY

## 2024-07-22 PROCEDURE — 82947 ASSAY GLUCOSE BLOOD QUANT: CPT

## 2024-07-22 PROCEDURE — 2500000002 HC RX 250 W HCPCS SELF ADMINISTERED DRUGS (ALT 637 FOR MEDICARE OP, ALT 636 FOR OP/ED): Performed by: STUDENT IN AN ORGANIZED HEALTH CARE EDUCATION/TRAINING PROGRAM

## 2024-07-22 PROCEDURE — 99233 SBSQ HOSP IP/OBS HIGH 50: CPT | Performed by: ANESTHESIOLOGY

## 2024-07-22 PROCEDURE — C1713 ANCHOR/SCREW BN/BN,TIS/BN: HCPCS | Performed by: ORTHOPAEDIC SURGERY

## 2024-07-22 PROCEDURE — 3600000009 HC OR TIME - EACH INCREMENTAL 1 MINUTE - PROCEDURE LEVEL FOUR: Performed by: ORTHOPAEDIC SURGERY

## 2024-07-22 PROCEDURE — C1769 GUIDE WIRE: HCPCS | Performed by: ORTHOPAEDIC SURGERY

## 2024-07-22 PROCEDURE — 2780000003 HC OR 278 NO HCPCS: Performed by: ORTHOPAEDIC SURGERY

## 2024-07-22 PROCEDURE — 3700000001 HC GENERAL ANESTHESIA TIME - INITIAL BASE CHARGE: Performed by: ORTHOPAEDIC SURGERY

## 2024-07-22 PROCEDURE — 94660 CPAP INITIATION&MGMT: CPT

## 2024-07-22 PROCEDURE — 2500000004 HC RX 250 GENERAL PHARMACY W/ HCPCS (ALT 636 FOR OP/ED): Performed by: STUDENT IN AN ORGANIZED HEALTH CARE EDUCATION/TRAINING PROGRAM

## 2024-07-22 PROCEDURE — 1100000001 HC PRIVATE ROOM DAILY

## 2024-07-22 PROCEDURE — 71045 X-RAY EXAM CHEST 1 VIEW: CPT | Performed by: RADIOLOGY

## 2024-07-22 PROCEDURE — 2500000001 HC RX 250 WO HCPCS SELF ADMINISTERED DRUGS (ALT 637 FOR MEDICARE OP)

## 2024-07-22 PROCEDURE — 2500000004 HC RX 250 GENERAL PHARMACY W/ HCPCS (ALT 636 FOR OP/ED): Performed by: NURSE ANESTHETIST, CERTIFIED REGISTERED

## 2024-07-22 PROCEDURE — 0QSH06Z REPOSITION LEFT TIBIA WITH INTRAMEDULLARY INTERNAL FIXATION DEVICE, OPEN APPROACH: ICD-10-PCS | Performed by: STUDENT IN AN ORGANIZED HEALTH CARE EDUCATION/TRAINING PROGRAM

## 2024-07-22 PROCEDURE — 7100000001 HC RECOVERY ROOM TIME - INITIAL BASE CHARGE: Performed by: ORTHOPAEDIC SURGERY

## 2024-07-22 PROCEDURE — 2720000007 HC OR 272 NO HCPCS: Performed by: ORTHOPAEDIC SURGERY

## 2024-07-22 PROCEDURE — 2500000004 HC RX 250 GENERAL PHARMACY W/ HCPCS (ALT 636 FOR OP/ED): Mod: JZ

## 2024-07-22 PROCEDURE — 7100000002 HC RECOVERY ROOM TIME - EACH INCREMENTAL 1 MINUTE: Performed by: ORTHOPAEDIC SURGERY

## 2024-07-22 PROCEDURE — 2500000001 HC RX 250 WO HCPCS SELF ADMINISTERED DRUGS (ALT 637 FOR MEDICARE OP): Performed by: ANESTHESIOLOGY

## 2024-07-22 PROCEDURE — 27827 TREAT LOWER LEG FRACTURE: CPT | Performed by: ORTHOPAEDIC SURGERY

## 2024-07-22 PROCEDURE — 2500000005 HC RX 250 GENERAL PHARMACY W/O HCPCS: Performed by: NURSE ANESTHETIST, CERTIFIED REGISTERED

## 2024-07-22 PROCEDURE — 3600000004 HC OR TIME - INITIAL BASE CHARGE - PROCEDURE LEVEL FOUR: Performed by: ORTHOPAEDIC SURGERY

## 2024-07-22 PROCEDURE — 2500000004 HC RX 250 GENERAL PHARMACY W/ HCPCS (ALT 636 FOR OP/ED): Performed by: ANESTHESIOLOGY

## 2024-07-22 PROCEDURE — 84132 ASSAY OF SERUM POTASSIUM: CPT

## 2024-07-22 PROCEDURE — 2500000004 HC RX 250 GENERAL PHARMACY W/ HCPCS (ALT 636 FOR OP/ED)

## 2024-07-22 PROCEDURE — 71045 X-RAY EXAM CHEST 1 VIEW: CPT

## 2024-07-22 PROCEDURE — A4649 SURGICAL SUPPLIES: HCPCS | Performed by: ORTHOPAEDIC SURGERY

## 2024-07-22 DEVICE — IMPLANTABLE DEVICE: Type: IMPLANTABLE DEVICE | Site: LEG | Status: FUNCTIONAL

## 2024-07-22 DEVICE — PIN, APEX, 5 X 150MM: Type: IMPLANTABLE DEVICE | Site: LEG | Status: FUNCTIONAL

## 2024-07-22 DEVICE — ROD, CONNECTING 11 X 350 HOFFMANN3: Type: IMPLANTABLE DEVICE | Site: LEG | Status: FUNCTIONAL

## 2024-07-22 RX ORDER — METHOCARBAMOL 100 MG/ML
500 INJECTION, SOLUTION INTRAMUSCULAR; INTRAVENOUS ONCE
Status: COMPLETED | OUTPATIENT
Start: 2024-07-22 | End: 2024-07-22

## 2024-07-22 RX ORDER — SODIUM CHLORIDE, SODIUM LACTATE, POTASSIUM CHLORIDE, CALCIUM CHLORIDE 600; 310; 30; 20 MG/100ML; MG/100ML; MG/100ML; MG/100ML
INJECTION, SOLUTION INTRAVENOUS CONTINUOUS PRN
Status: DISCONTINUED | OUTPATIENT
Start: 2024-07-22 | End: 2024-07-22

## 2024-07-22 RX ORDER — TRANEXAMIC ACID 100 MG/ML
INJECTION, SOLUTION INTRAVENOUS AS NEEDED
Status: DISCONTINUED | OUTPATIENT
Start: 2024-07-22 | End: 2024-07-22

## 2024-07-22 RX ORDER — ESMOLOL HYDROCHLORIDE 10 MG/ML
INJECTION INTRAVENOUS AS NEEDED
Status: DISCONTINUED | OUTPATIENT
Start: 2024-07-22 | End: 2024-07-22

## 2024-07-22 RX ORDER — ROCURONIUM BROMIDE 10 MG/ML
INJECTION, SOLUTION INTRAVENOUS AS NEEDED
Status: DISCONTINUED | OUTPATIENT
Start: 2024-07-22 | End: 2024-07-22

## 2024-07-22 RX ORDER — FLUTICASONE FUROATE AND VILANTEROL 200; 25 UG/1; UG/1
1 POWDER RESPIRATORY (INHALATION) DAILY
Status: DISCONTINUED | OUTPATIENT
Start: 2024-07-22 | End: 2024-07-29 | Stop reason: HOSPADM

## 2024-07-22 RX ORDER — PREDNISONE 20 MG/1
10 TABLET ORAL DAILY
Status: DISCONTINUED | OUTPATIENT
Start: 2024-07-28 | End: 2024-07-29 | Stop reason: HOSPADM

## 2024-07-22 RX ORDER — ALBUTEROL SULFATE 0.83 MG/ML
2.5 SOLUTION RESPIRATORY (INHALATION) ONCE
Status: COMPLETED | OUTPATIENT
Start: 2024-07-22 | End: 2024-07-22

## 2024-07-22 RX ORDER — ALBUTEROL SULFATE 0.83 MG/ML
2.5 SOLUTION RESPIRATORY (INHALATION) EVERY 4 HOURS PRN
Status: DISCONTINUED | OUTPATIENT
Start: 2024-07-22 | End: 2024-07-23

## 2024-07-22 RX ORDER — ONDANSETRON HYDROCHLORIDE 2 MG/ML
4 INJECTION, SOLUTION INTRAVENOUS ONCE AS NEEDED
Status: DISCONTINUED | OUTPATIENT
Start: 2024-07-22 | End: 2024-07-22 | Stop reason: HOSPADM

## 2024-07-22 RX ORDER — LABETALOL HYDROCHLORIDE 5 MG/ML
5 INJECTION, SOLUTION INTRAVENOUS ONCE AS NEEDED
Status: DISCONTINUED | OUTPATIENT
Start: 2024-07-22 | End: 2024-07-22 | Stop reason: HOSPADM

## 2024-07-22 RX ORDER — OXYCODONE HYDROCHLORIDE 5 MG/1
5 TABLET ORAL EVERY 6 HOURS PRN
Status: DISCONTINUED | OUTPATIENT
Start: 2024-07-22 | End: 2024-07-29 | Stop reason: HOSPADM

## 2024-07-22 RX ORDER — OXYCODONE HYDROCHLORIDE 5 MG/1
2.5 TABLET ORAL EVERY 6 HOURS PRN
Status: DISCONTINUED | OUTPATIENT
Start: 2024-07-22 | End: 2024-07-29 | Stop reason: HOSPADM

## 2024-07-22 RX ORDER — CLINDAMYCIN PHOSPHATE 600 MG/50ML
600 INJECTION, SOLUTION INTRAVENOUS EVERY 8 HOURS
Status: COMPLETED | OUTPATIENT
Start: 2024-07-22 | End: 2024-07-23

## 2024-07-22 RX ORDER — PROPOFOL 10 MG/ML
INJECTION, EMULSION INTRAVENOUS AS NEEDED
Status: DISCONTINUED | OUTPATIENT
Start: 2024-07-22 | End: 2024-07-22

## 2024-07-22 RX ORDER — ONDANSETRON HYDROCHLORIDE 2 MG/ML
INJECTION, SOLUTION INTRAVENOUS AS NEEDED
Status: DISCONTINUED | OUTPATIENT
Start: 2024-07-22 | End: 2024-07-22

## 2024-07-22 RX ORDER — FENTANYL CITRATE 50 UG/ML
INJECTION, SOLUTION INTRAMUSCULAR; INTRAVENOUS AS NEEDED
Status: DISCONTINUED | OUTPATIENT
Start: 2024-07-22 | End: 2024-07-22

## 2024-07-22 RX ORDER — SODIUM CHLORIDE, SODIUM LACTATE, POTASSIUM CHLORIDE, CALCIUM CHLORIDE 600; 310; 30; 20 MG/100ML; MG/100ML; MG/100ML; MG/100ML
100 INJECTION, SOLUTION INTRAVENOUS CONTINUOUS
Status: DISCONTINUED | OUTPATIENT
Start: 2024-07-22 | End: 2024-07-22 | Stop reason: HOSPADM

## 2024-07-22 RX ORDER — ASPIRIN 81 MG/1
81 TABLET ORAL 2 TIMES DAILY
Status: DISCONTINUED | OUTPATIENT
Start: 2024-07-22 | End: 2024-07-29 | Stop reason: HOSPADM

## 2024-07-22 RX ORDER — OXYCODONE HYDROCHLORIDE 5 MG/1
5 TABLET ORAL EVERY 4 HOURS PRN
Status: DISCONTINUED | OUTPATIENT
Start: 2024-07-22 | End: 2024-07-22 | Stop reason: HOSPADM

## 2024-07-22 RX ORDER — MONTELUKAST SODIUM 10 MG/1
10 TABLET ORAL NIGHTLY
Status: DISCONTINUED | OUTPATIENT
Start: 2024-07-22 | End: 2024-07-29 | Stop reason: HOSPADM

## 2024-07-22 RX ORDER — PREDNISONE 5 MG/1
5 TABLET ORAL DAILY
Status: DISCONTINUED | OUTPATIENT
Start: 2024-07-31 | End: 2024-07-29 | Stop reason: HOSPADM

## 2024-07-22 RX ORDER — GLYCOPYRROLATE 0.2 MG/ML
INJECTION INTRAMUSCULAR; INTRAVENOUS AS NEEDED
Status: DISCONTINUED | OUTPATIENT
Start: 2024-07-22 | End: 2024-07-22

## 2024-07-22 RX ORDER — LIDOCAINE HYDROCHLORIDE 10 MG/ML
0.1 INJECTION INFILTRATION; PERINEURAL ONCE
Status: DISCONTINUED | OUTPATIENT
Start: 2024-07-22 | End: 2024-07-22 | Stop reason: HOSPADM

## 2024-07-22 RX ORDER — PHENYLEPHRINE HCL IN 0.9% NACL 0.4MG/10ML
SYRINGE (ML) INTRAVENOUS AS NEEDED
Status: DISCONTINUED | OUTPATIENT
Start: 2024-07-22 | End: 2024-07-22

## 2024-07-22 RX ORDER — LIDOCAINE HCL/PF 100 MG/5ML
SYRINGE (ML) INTRAVENOUS AS NEEDED
Status: DISCONTINUED | OUTPATIENT
Start: 2024-07-22 | End: 2024-07-22

## 2024-07-22 RX ORDER — HYDROMORPHONE HYDROCHLORIDE 1 MG/ML
0.2 INJECTION, SOLUTION INTRAMUSCULAR; INTRAVENOUS; SUBCUTANEOUS EVERY 5 MIN PRN
Status: DISCONTINUED | OUTPATIENT
Start: 2024-07-22 | End: 2024-07-22 | Stop reason: HOSPADM

## 2024-07-22 RX ORDER — PREDNISONE 20 MG/1
20 TABLET ORAL DAILY
Status: COMPLETED | OUTPATIENT
Start: 2024-07-22 | End: 2024-07-24

## 2024-07-22 RX ORDER — ACETAMINOPHEN 325 MG/1
650 TABLET ORAL EVERY 4 HOURS PRN
Status: DISCONTINUED | OUTPATIENT
Start: 2024-07-22 | End: 2024-07-22 | Stop reason: HOSPADM

## 2024-07-22 RX ORDER — CLINDAMYCIN PHOSPHATE 150 MG/ML
INJECTION, SOLUTION INTRAVENOUS AS NEEDED
Status: DISCONTINUED | OUTPATIENT
Start: 2024-07-22 | End: 2024-07-22

## 2024-07-22 SDOH — HEALTH STABILITY: MENTAL HEALTH: CURRENT SMOKER: 1

## 2024-07-22 ASSESSMENT — PAIN SCALES - GENERAL
PAINLEVEL_OUTOF10: 0 - NO PAIN
PAINLEVEL_OUTOF10: 0 - NO PAIN
PAINLEVEL_OUTOF10: 10 - WORST POSSIBLE PAIN
PAINLEVEL_OUTOF10: 8
PAINLEVEL_OUTOF10: 0 - NO PAIN
PAINLEVEL_OUTOF10: 10 - WORST POSSIBLE PAIN
PAINLEVEL_OUTOF10: 10 - WORST POSSIBLE PAIN
PAIN_LEVEL: 4
PAINLEVEL_OUTOF10: 10 - WORST POSSIBLE PAIN
PAINLEVEL_OUTOF10: 10 - WORST POSSIBLE PAIN
PAINLEVEL_OUTOF10: 0 - NO PAIN
PAINLEVEL_OUTOF10: 10 - WORST POSSIBLE PAIN
PAINLEVEL_OUTOF10: 0 - NO PAIN
PAINLEVEL_OUTOF10: 0 - NO PAIN
PAINLEVEL_OUTOF10: 10 - WORST POSSIBLE PAIN
PAINLEVEL_OUTOF10: 10 - WORST POSSIBLE PAIN
PAINLEVEL_OUTOF10: 0 - NO PAIN
PAINLEVEL_OUTOF10: 10 - WORST POSSIBLE PAIN
PAINLEVEL_OUTOF10: 8
PAINLEVEL_OUTOF10: 10 - WORST POSSIBLE PAIN
PAINLEVEL_OUTOF10: 10 - WORST POSSIBLE PAIN

## 2024-07-22 ASSESSMENT — COGNITIVE AND FUNCTIONAL STATUS - GENERAL
TOILETING: A LITTLE
PERSONAL GROOMING: A LITTLE
CLIMB 3 TO 5 STEPS WITH RAILING: A LITTLE
HELP NEEDED FOR BATHING: A LITTLE
DAILY ACTIVITIY SCORE: 18
EATING MEALS: A LITTLE
TURNING FROM BACK TO SIDE WHILE IN FLAT BAD: A LITTLE
MOVING TO AND FROM BED TO CHAIR: A LITTLE
STANDING UP FROM CHAIR USING ARMS: A LITTLE
WALKING IN HOSPITAL ROOM: A LITTLE
DRESSING REGULAR LOWER BODY CLOTHING: A LITTLE
DRESSING REGULAR UPPER BODY CLOTHING: A LITTLE
MOBILITY SCORE: 18
MOVING FROM LYING ON BACK TO SITTING ON SIDE OF FLAT BED WITH BEDRAILS: A LITTLE

## 2024-07-22 ASSESSMENT — COLUMBIA-SUICIDE SEVERITY RATING SCALE - C-SSRS
2. HAVE YOU ACTUALLY HAD ANY THOUGHTS OF KILLING YOURSELF?: NO
6. HAVE YOU EVER DONE ANYTHING, STARTED TO DO ANYTHING, OR PREPARED TO DO ANYTHING TO END YOUR LIFE?: NO
1. IN THE PAST MONTH, HAVE YOU WISHED YOU WERE DEAD OR WISHED YOU COULD GO TO SLEEP AND NOT WAKE UP?: NO

## 2024-07-22 NOTE — ANESTHESIA POSTPROCEDURE EVALUATION
Patient: Renee Lombardo    Procedure Summary       Date: 07/22/24 Room / Location: [facility] OR 08 / [facility]    Anesthesia Start: 0732 Anesthesia Stop: 0945    Procedures:       hind foot fusion nail (Left: Ankle)      Open Reduction Internal Fixation Tibia (Left: Leg Lower) Diagnosis:     Surgeons: Channing Vasquez MD Responsible Provider: Franck Lr DO    Anesthesia Type: general ASA Status: 3            Anesthesia Type: general    Vitals Value Taken Time   /71 07/22/24 1045   Temp 36.5 °C (97.7 °F) 07/22/24 0943   Pulse 83 07/22/24 1053   Resp 22 07/22/24 1053   SpO2 98 % 07/22/24 1053   Vitals shown include unfiled device data.    Anesthesia Post Evaluation    Patient location during evaluation: bedside  Patient participation: complete - patient participated  Level of consciousness: awake  Pain score: 4  Pain management: adequate  Multimodal analgesia pain management approach  Airway patency: patent  Cardiovascular status: acceptable  Respiratory status: acceptable and nasal cannula  Hydration status: acceptable  Postoperative Nausea and Vomiting: none        Encounter Notable Events   Notable Event Outcome Phase Comment   Airway obstruction requiring support  Intraprocedure mucus plug req. bronch and pulm laqvage   Hypoxemia resolved, returned to baseline O2 requirement on nasal cannula

## 2024-07-22 NOTE — BRIEF OP NOTE
Date: 7/15/2024 - 2024  OR Location: [facility]    Name: Renee Lombardo, : 1950, Age: 73 y.o., MRN: [id number], Sex: female    Diagnosis  * No Diagnosis Codes entered * * No Diagnosis Codes entered *     Procedures  hind foot fusion nail  27640 - [place] ARTHRODESIS ANKLE OPEN    Open Reduction Internal Fixation Tibia  19478 - [place] OPEN TREATMENT FRACTURE DISTAL TIBIA ONLY      Surgeons      * Channing Vasquez - Primary    Resident/Fellow/Other Assistant:  Surgeons and Role:     * Marco A Bunch MD - Resident - Assisting     * Jamaal Ramos DPM - Resident - Assisting    Procedure Summary  Anesthesia: Anesthesia type not filed in the log.  ASA: III  Anesthesia Staff: Anesthesiologist: Franck Lr DO  CRNA: LOURDES Banegas-CRNA  Estimated Blood Loss: 50mL  Intra-op Medications: * Intraprocedure medication information is unavailable because the case start and end events have not been set *           Anesthesia Record               Intraprocedure I/O Totals          Intake    Tranexamic Acid 0.00 mL    The total shown is the total volume documented since Anesthesia Start was filed.    Total Intake 0 mL          Specimen: No specimens collected     Staff:   Sitaulator: Reuben  Circulator: Moises  Scrub Person: Bob  Scrub Person: Nicci      Findings: Removal of ex-fix, hindfoot fusion    Complications:  None; patient tolerated the procedure well.     Disposition: PACU - hemodynamically stable.  Condition: stable  Specimens Collected: No specimens collected    Félix Lazo MD  Orthopaedic Surgery PGY-1  [facility]  Available by Epic Chat    Please reach out to the orthopaedic on-call ANGIE or resident (please refer to Ricardo)      On weekends and after 6PM:  At [facility]: Please reach out to the orthopaedic on-call resident ([phone])  At [place]: Please reach out to the orthopaedic on-call ANGIE or resident (please refer to Ricardo)

## 2024-07-22 NOTE — PROGRESS NOTES
Orthopaedic Surgery Progress Note    Subjective:  Resting comfortably in bed this AM. Leg elevated with improved swelling. Respiratory status has improved - appeciate recs from pulmonology. Pain controlled. Denies N/V. Denies N/T.    Objective:  Vitals:    07/22/24 0030   BP: 122/80   Pulse: 80   Resp: 16   Temp: 36.9 °C (98.4 °F)   SpO2: 92%     Physical Exam  - Constitutional: No acute distress, cooperative  - Eyes: EOM grossly intact  - Head/Neck: Trachea midline  - Respiratory/Thorax: NWOB  - Cardiovascular: RRR on peripheral palpation  - Gastrointestinal: Nondistended  - Psychological: Appropriate mood/behavior  - Skin: Warm and dry. Additional findings in musculoskeletal evaluation  - Musculoskeletal:  ---  Left lower extremity:  - Ex-fix w dressing C/D/I  - Leg elevated  - Compartments soft and compressible  - Fires TA/GS/EHL  - SILT in Miller/Sa/SP/DP/T distribution  - Palpable DP pulse    Results for orders placed or performed during the hospital encounter of 07/15/24 (from the past 24 hour(s))   Type And Screen   Result Value Ref Range    ABO TYPE O     Rh TYPE POS     ANTIBODY SCREEN NEG    Basic Metabolic Panel   Result Value Ref Range    Glucose 116 (H) 74 - 99 mg/dL    Sodium 139 136 - 145 mmol/L    Potassium 4.2 3.5 - 5.3 mmol/L    Chloride 103 98 - 107 mmol/L    Bicarbonate 22 21 - 32 mmol/L    Anion Gap 18 10 - 20 mmol/L    Urea Nitrogen 37 (H) 6 - 23 mg/dL    Creatinine 0.69 0.50 - 1.05 mg/dL    eGFR >90 >60 mL/min/1.73m*2    Calcium 9.7 8.6 - 10.6 mg/dL   CBC and Auto Differential   Result Value Ref Range    WBC 6.1 4.4 - 11.3 x10*3/uL    nRBC 0.0 0.0 - 0.0 /100 WBCs    RBC 4.74 4.00 - 5.20 x10*6/uL    Hemoglobin 11.4 (L) 12.0 - 16.0 g/dL    Hematocrit 38.2 36.0 - 46.0 %    MCV 81 80 - 100 fL    MCH 24.1 (L) 26.0 - 34.0 pg    MCHC 29.8 (L) 32.0 - 36.0 g/dL    RDW 15.9 (H) 11.5 - 14.5 %    Platelets 371 150 - 450 x10*3/uL    Neutrophils % 64.0 40.0 - 80.0 %    Immature Granulocytes %, Automated 0.7 0.0  - 0.9 %    Lymphocytes % 27.4 13.0 - 44.0 %    Monocytes % 5.8 2.0 - 10.0 %    Eosinophils % 1.3 0.0 - 6.0 %    Basophils % 0.8 0.0 - 2.0 %    Neutrophils Absolute 3.88 1.60 - 5.50 x10*3/uL    Immature Granulocytes Absolute, Automated 0.04 0.00 - 0.50 x10*3/uL    Lymphocytes Absolute 1.66 0.80 - 3.00 x10*3/uL    Monocytes Absolute 0.35 0.05 - 0.80 x10*3/uL    Eosinophils Absolute 0.08 0.00 - 0.40 x10*3/uL    Basophils Absolute 0.05 0.00 - 0.10 x10*3/uL   Coagulation Screen   Result Value Ref Range    Protime 11.3 9.8 - 12.8 seconds    INR 1.0 0.9 - 1.1    aPTT 31 27 - 38 seconds         XR chest 1 view   Final Result   1. Bandlike atelectasis in left mid lung.   2. No definite focal infiltrate, pleural effusion or pneumothorax.                  Signed by: Blanche Kimble 7/21/2024 12:01 PM   Dictation workstation:   [id number]      XR chest 1 view   Final Result   1. Similar bibasilar atelectasis. No other consolidation, effusion,   or pneumothorax.        MACRO:   I personally reviewed the images/study and I agree with the findings   as stated by Anirudh Jaimes MD. This study was interpreted at   [place], [place].        Signed by: Alexx Schultz 7/19/2024 4:52 PM   Dictation workstation:   [id number]      CT ankle left wo IV contrast   Final Result   1. Interval external fixation of comminuted distal tibial pilon   fracture and distal fibular fracture with similar alignment from the   prior.   2. Small intra-articular body in the ankle joint laterally        I personally reviewed the images/study and I agree with the findings   as stated by Dr. Balaji Tapia. This study was interpreted at [place], [place].        MACRO:   None        Signed by: Zurdo Chino 7/16/2024 6:57 PM   Dictation workstation:   [id number]      FL fluoro images no charge   Final Result      Lower extremity venous duplex bilateral   Final Result       Transthoracic Echo (TTE) Complete   Final Result      XR ankle left 3+ views   Final Result   Status post splinting of impacted distal tibial and fibular fractures   without significant change in alignment when compared to prior.        I personally reviewed the images/study and I agree with the findings   as stated by Marco A Lucas MD (Radiology Resident).   This study was interpreted at [place], [place].        MACRO:   None        Signed by: Kiesha Ordonez 7/16/2024 3:14 PM   Dictation workstation:   [id number]      XR chest 1 view   Final Result   1.  Atelectasis within the right mid lung and bilateral lung bases.   No consolidation, pleural effusion or pneumothorax.        I personally reviewed the images/study and I agree with the findings   as stated by Marco A Lucas MD (Radiology Resident).   This study was interpreted at [place], [place].        MACRO:   None        Signed by: Alexx Schultz 7/16/2024 10:20 AM   Dictation workstation:   [id number]      FL less than 1 hour    (Results Pending)   FL less than 1 hour    (Results Pending)     Assessment:  73F (COPD on home O2, DANG, CHF, HTN) direct admit from [facility] p/w above after mGLF. Closed, NVI. CT w large posterior mal, small anterior cortical defect. Now s/p L ankle-spanning Ex-Fix with Dr. Mann.     Plan:  - Weight-bearing status: NWB LLE  - Tentative OR today w/ Dr. Vasquez for L hindfoot fusion nail, 7/22.   - DVT: hold this AM for OR  - Post ex-fix CT L ankle complete  - Feeding: Regular diet as tolerated, bowel regimen  - NPO at midnight for OR 7/22  - Analgesia: Multimodal  - Volume: mIV @ at  cc/hr, HLIVF when tolerating PO, monitor BMP  - OT/PT: Consulted  - Respiratory: Encourage IS, maintain O2 sat >92%  - Infection: Madai-operative Ancef for 24 hours, afebrile   - Lines: Maintain PIVx2 while  inpatient  - Drains: N  - Rhodes: N  - Transfusion: Trend CBC, no indication for transfusion  - Cardiac: No issues  - Glycemic: No issues  - C/w home medications - home COPD medications and breathing treatments  - Appreciate pulmonology recs  - Dispo pending PT, pain control    This patient will be followed by the Orthopaedic Trauma service. Please page or Epic Chat the corresponding residents below with questions or concerns.     Ortho Trauma Service ([employer] Chat Preferred)  First call: Félix Lazo MD PGY-1  First call: Fortunato Goss MD PGY-1  Second call: Renzo Samules, PGY-2  Third call: Donte Zaragoza PGY-3    6pm-6am M-F, Holidays, and weekends page Ortho on-call @[phone] with urgent questions/concerns.     Félix Lazo MD  Orthopaedic Surgery PGY-1  [facility]  Available by Epic Chat    Please reach out to the orthopaedic on-call ANGIE or resident (please refer to Qgenda)      On weekends and after 6PM:  At [facility]: Please reach out to the orthopaedic on-call resident ([phone])  At [facility]: Please reach out to the orthopaedic on-call ANGIE or resident (please refer to Qgenda)

## 2024-07-22 NOTE — CARE PLAN
Problem: Fall/Injury  Goal: Not fall by end of shift  Outcome: Progressing  Goal: Be free from injury by end of the shift  Outcome: Progressing  Goal: Verbalize understanding of personal risk factors for fall in the hospital  Outcome: Progressing  Goal: Verbalize understanding of risk factor reduction measures to prevent injury from fall in the home  Outcome: Progressing  Goal: Use assistive devices by end of the shift  Outcome: Progressing  Goal: Pace activities to prevent fatigue by end of the shift  Outcome: Progressing     Problem: Pain  Goal: Takes deep breaths with improved pain control throughout the shift  Outcome: Progressing  Goal: Turns in bed with improved pain control throughout the shift  Outcome: Progressing  Goal: Walks with improved pain control throughout the shift  Outcome: Progressing  Goal: Performs ADL's with improved pain control throughout shift  Outcome: Progressing  Goal: Participates in PT with improved pain control throughout the shift  Outcome: Progressing  Goal: Free from opioid side effects throughout the shift  Outcome: Progressing  Goal: Free from acute confusion related to pain meds throughout the shift  Outcome: Progressing     Problem: Pain - Adult  Goal: Verbalizes/displays adequate comfort level or baseline comfort level  Outcome: Progressing     Problem: Safety - Adult  Goal: Free from fall injury  Outcome: Progressing     Problem: Discharge Planning  Goal: Discharge to home or other facility with appropriate resources  Outcome: Progressing     Problem: Chronic Conditions and Co-morbidities  Goal: Patient's chronic conditions and co-morbidity symptoms are monitored and maintained or improved  Outcome: Progressing     Problem: Skin  Goal: Decreased wound size/increased tissue granulation at next dressing change  Outcome: Progressing  Goal: Participates in plan/prevention/treatment measures  Outcome: Progressing  Goal: Prevent/manage excess moisture  Outcome: Progressing  Goal:  Prevent/minimize sheer/friction injuries  Outcome: Progressing  Goal: Promote/optimize nutrition  Outcome: Progressing  Goal: Promote skin healing  Outcome: Progressing   The patient's goals for the shift include get sleep    The clinical goals for the shift include Patient will remain free from falls and injuries throughout shift

## 2024-07-22 NOTE — H&P
[facility] Department of Orthopaedic Surgery   Surgical History & Physical <30 Days    Reason for Surgery: L pilon fx, L lateral mal fx  Planned Procedure: Removal of ex-fix, hindfoot fusion nail L ankle    History & Physical Reviewed:  I have reviewed the History and Physical within 30 days dated 7/16/24. Relevant findings and updates are noted below:  No significant changes.    Home medications were reviewed with significant updates noted below:  No significant changes.    ERAS patient?: No    COVID-19 Risk Consent:   Surgeon has reviewed the key risks related to yolanda COVID-19 and subsequent sequelae.     07/22/24 at 4:04 AM - Fortunato Goss MD

## 2024-07-22 NOTE — ANESTHESIA PREPROCEDURE EVALUATION
"Patient: Renee Lombardo    Procedure Information       Date/Time: 07/22/24 0715    Procedures:       hind foot fusion nail (Left: Ankle)      Open Reduction Internal Fixation Tibia (Left: Leg Lower)    Location: [facility] OR 08 / [facility] OR    Surgeons: Channing Vasquez MD          Briefly, 74 yo female presenting for left ankle ORIF. Case previously cancelled due to COPD exacerbation with improvement following initiation of steroids. Now on 3 L NC. Patient denies regularly wearing O2 at home, only using \"when needed such as when feeling congested\". Improved symptoms after starting steroids.     Relevant Problems   Anesthesia (within normal limits)      Cardiac   (+) CHF (congestive heart failure) (Multi)   (+) HTN (hypertension)      Pulmonary  Recent COPD exacerbation requiring steroid burst and taper during hospitalization   (+) Chronic obstructive pulmonary disease (Multi)   (+) DANG (obstructive sleep apnea)      GI   (+) Gastroesophageal reflux disease      /Renal (within normal limits)      Liver (within normal limits)      Endocrine   (+) Obesity      Hematology   (+) Anemia   (+) DVT (deep venous thrombosis) (Multi)       Clinical information reviewed:   Tobacco  Allergies  Meds  Problems  Med Hx  Surg Hx   Fam Hx  Soc   Hx        NPO Detail:  No data recorded     Physical Exam    Airway  Mallampati: II  TM distance: >3 FB  Neck ROM: limited     Cardiovascular   Rhythm: regular  Rate: normal     Dental   (+) upper dentures  Comments: Multiple missing lower teeth   Pulmonary   (+) rhonchi, decreased breath sounds     Abdominal   (+) obese  Abdomen: soft       Other findings: Speaking in full sentences on 3 L NC.           Anesthesia Plan    History of general anesthesia?: yes  History of complications of general anesthesia?: no    ASA 3     general     The patient is a current smoker.  Patient was previously instructed to abstain from smoking on day of procedure.  Patient did not smoke on " day of procedure.    intravenous induction   Postoperative administration of opioids is intended.  Trial extubation is planned.  Anesthetic plan and risks discussed with patient.  Use of blood products discussed with patient who consented to blood products.    Plan discussed with CRNA.

## 2024-07-22 NOTE — PROGRESS NOTES
"Orthopaedic Surgery Progress Note    Subjective: Evaluated in immediate postoperative period. Having some pain, requesting block. On O2. Denies chest pain, or fevers.    Objective:  /71   Pulse 71   Temp 36.4 °C (97.5 °F) (Temporal)   Resp 20   Ht 1.448 m (4' 9.01\")   Wt 65.8 kg (145 lb)   SpO2 94%   BMI 31.37 kg/m²     Gen: arousable, NAD, appropriately conversational  Cardiac: RRR to peripheral palpation  Resp: nonlabored on RA  GI: soft, nondistended    MSK:  - ACE dressing c/d/i  - Compartments soft and compressible  - Fires TA/GS/EHL  - SILT in Miller/Sa/SP/DP/T distribution  - Palpable DP pulse    Assessment/Plan: 73 y.o. female s/p removal of ex-fix, L hindfoot fusion nail on 7/22/24 with Dr. Vasquez.      Plan:  - Weight bearing: NWB LLE  - DVT ppx: SCDs, ASA 81 mg BID  - Diet: Regular  - Pain: Tylenol, oxycodone 5/10  - Antibiotics: perioperative ancef 2g q8hr x3 doses  - Dressing: ACE/kerlix  - FEN: HLIV with good PO intake  - Bowel Regimen: Colace, senna, dulcolax  - PT/OT  - Pulm: Encourage IS, appreciate pulmonology continuing to follow to optimize respiratory status  - Continue home medications  - No mccabe    Dispo: pending PT/OT, postoperative course    Félix Lazo MD  Orthopaedic Surgery PGY-1  [facility]  Available by Epic Chat    While admitted, this patient will be followed by the Ortho Trauma Team. Please contact below residents with any questions (available via Epic Chat).     First call: Félix Lazo & Fortunato Goss, PGY-1  Second call: Renzo Samuels, PGY-2   Third call: Donte Zaragoza, PGY-3    On weekends and after 6PM:  At [facility]: Please reach out to the orthopaedic on-call resident ([phone])  At [facility]: Please reach out to the orthopaedic on-call ANGIE or resident (please refer to Qgenda)   "

## 2024-07-22 NOTE — OP NOTE
Open Reduction Internal Fixation Tibia (L) Operative Note     Date: 7/15/2024 - 2024  OR Location: [facility] OR    Name: Renee Lombardo, : 1950, Age: 73 y.o., MRN: [id number], Sex: female    Diagnosis  Left pilon fracture    Procedures  Operative fixation of left pilon fracture with hindfoot intramedullary nail  Removal of ankle spanning external fixator    Surgeons      * Channing Vasquez - Primary    Resident/Fellow/Other Assistant:  Surgeons and Role:     * Marco A Bunch MD - Resident - Assisting     * Jamaal Ramos DPM - Resident - Assisting    Procedure Summary  Anesthesia: Anesthesia type not filed in the log.  ASA: III  Anesthesia Staff: Anesthesiologist: Franck Lr DO  CRNA: LOURDES Banegas-CRNA  Estimated Blood Loss: 20mL  Intra-op Medications: * Intraprocedure medication information is unavailable because the case start and end events have not been set *           Anesthesia Record               Intraprocedure I/O Totals          Intake    Tranexamic Acid 0.00 mL    The total shown is the total volume documented since Anesthesia Start was filed.    Total Intake 0 mL          Specimen: No specimens collected     Staff:   Circulator: Reuben  Circulator: Moises  Scrub Person: Nasseem  Scrub Person: Nicci         Drains and/or Catheters: * None in log *    Tourniquet Times:         Implants:  Implants       Type Name Action Serial No.       Screw 70mm Implanted      Screw SCREW, VALOR, 5MM X 75MM - [id number] Implanted       Nail Left 11.5 mm Implanted      Screw SCREW, VALOR, 5MM X 30MM - [id number] Implanted       nail end cap Implanted      Screw SCREW, VALOR, 5MM X 25MM - [id number] Implanted      Screw PIN, APEX, 5 X 150MM - [id number] Implanted      Implant ALEE, CONNECTING 11 X 350 HOFFMANN3 - [id number] Implanted               Findings: Left pilon fracture    Indications:   This is a 73-year-old female sustained a mechanical ground-level fall approximately 1 week ago.  Due  to her articular comminution, severely poor bone quality, as well as soft tissue swelling, the patient was initially temporized using an ankle spanning external fixator on 7/16/2024 with my colleague Dr. Mann.  With resolution of the patient's soft tissue swelling, she was indicated for definitive fixation of her left pilon fracture in the form of intramedullary hindfoot nail.  Risks and benefits of surgery were discussed with the patient preoperatively including but not limited to infection, bleeding, injury to surrounding structures including nerves, vessels, tendons, malunion/nonunion, potential need for additional surgery down the line.  After discussion, patient voiced understanding and agreed to proceed with surgical intervention.     Procedure Details:   On the day of surgery, the patient was met in the preoperative holding area and informed consent was obtained from the patient.  The correct operative site was marked.  The patient was brought back to the operating room. A timeout was performed to verify patient identity, procedure, and correct operative site.  General anesthesia with endotracheal intubation was performed. Patient was placed supine on the operating room table.  The ankle spanning external fixator was removed.  The left lower extremity was prepped and draped in the normal sterile fashion.  Prophylactic Ancef was given.    To start, under fluoroscopic guidance, the ideal start point for hindfoot intramedullary nail was obtained (center of the talus on the lateral, and just lateral of center on the AP).  Opening guidewire was advanced past the subtalar joint and tibiotalar joint.  Opening reamer was placed over top.  Ball-tipped guidewire was advanced retrograde up the tibia which measured approximately 300 mm.  An appropriately sized 250 mm-nail was then selected.  The hindfoot and tibia was then sequentially reamed up to 12.5 in order to accommodate an 11.5 mm nail.  The nail was then gently  inserted.  Using the distal targeting guide, the interlocking calcaneal and subtalar bolts were inserted as well as a medial to lateral static interlock within the distal tibial shaft.  Proximally, an additional medial to lateral interlocking bolt was placed using perfect [place] technique.  Final fluoroscopic evaluation of both the tibia and ankle confirm maintenance of hindfoot-ankle alignment as well as appropriate position of hardware.  All wounds were copiously irrigated using sterile normal saline at this point.  Previous external fixator pin sites were curetted.  Deep dermal layers were approximated using 2-0 Monocryl suture.  Skin was closed using combination of staples and 2-0 nylon.  The patient was extubated without complication and returned to PACU in stable condition.    Dr. Vasquez was present for the entirety of the case.     Postoperative Plan:  Patient will be weightbearing as tolerated to the left lower extremity postoperatively.  She will return to the floor under the care of the medicine team.  She will receive 24 hours of Ancef postoperatively.  She may resume DVT chemoprophylaxis at the discretion of the primary team.  She will follow-up with Dr. Vasquez in approximately 2 to 3 weeks for routine postoperative evaluation and at that time we will obtain repeat x-rays of the left ankle and tibia (2 views, AP and lateral).          Attending Attestation:     Channing Vasquez  Phone Number: [phone]

## 2024-07-22 NOTE — DISCHARGE INSTRUCTIONS
Orthopaedic Surgery Discharge Instructions:  Follow-Up Instructions  You will need to be seen in clinic by Dr. Vasquez in 3 weeks for a post-operative evaluation.    You will need to call and schedule an appointment, unless there is a previous appointment that appears on your discharge instructions.  The direct orthopaedic clinic appointment line phone number is [phone].  Please do not delay in calling to make this appointment.    You should also follow up with your primary care provider in 1-2 weeks.    Activity Restrictions  1) No driving until further instructed by your orthopaedic physician, which will be addressed at your outpatient appointments.    2) No driving or operating heavy machinery while taking narcotic pain medication.    3) Weight bearing status --> weight bearing as tolerated left lower extremity.     Discharge Medications  You have been sent home with the following home medications: Oxycodone, Colace, and Aspirin.  Please wean yourself off the oxycodone, as tolerated. A good time to take the medication is before physical therapy sessions and bedtime. Colace is a stool softener to reduce the narcotic pain medications cause. Take it twice a day while taking narcotic pain medication to ensure you maintain your regular bowel movement frequency. Baby aspirin is taken twice daily to help reduce your risk of blood clots.    You should also take tylenol 650mg every 6 hours as needed to reduce the amount of oxycodone you need for pain.    Wound care instructions:   1) Leave operative dressing in place until POD7. Then remove and leave incision open to air. Let water run freely over incision when showering, do not scrub. Do not soak in pool or tub.    2) Call if any drainage after 7 days, increased redness/warmth/swelling at incision site, abnormal pain/tenderness of the extremity, abnormal swelling of the extremity that does not respond to elevation, SOB/chest pain.

## 2024-07-22 NOTE — PROGRESS NOTES
Renee Lombardo is a 73 y.o. female on day 7 of admission presenting with Closed left ankle fracture, initial encounter.      Subjective   S/p ORIF today, hoarse voice from intubation       Objective     Last Recorded Vitals  /88 (BP Location: Left arm, Patient Position: Lying)   Pulse 91   Temp 36.5 °C (97.7 °F) (Temporal)   Resp 19   Wt 65.8 kg (145 lb)   SpO2 92%   Intake/Output last 3 Shifts:    Intake/Output Summary (Last 24 hours) at 7/22/2024 1554  Last data filed at 7/22/2024 1216  Gross per 24 hour   Intake 625 ml   Output 450 ml   Net 175 ml       Admission Weight  Weight: 65.8 kg (145 lb) (07/16/24 0250)    Daily Weight  07/16/24 : 65.8 kg (145 lb)    Image Results  Electrocardiogram, 12-lead PRN ACS symptoms  Normal sinus rhythm  Nonspecific ST abnormality  Abnormal ECG  When compared with ECG of 16-JUL-2024 02:51,  Criteria for Septal infarct are no longer Present  XR chest 1 view  Narrative: Interpreted By:  Renzo Vergara,  Marlon Oleary   STUDY:  XR CHEST 1 VIEW;  7/22/2024 9:25 am      INDICATION:  Signs/Symptoms:hypoxemia with increased airway pressure.      COMPARISON:  Comparison radiograph 07/21/2024      ACCESSION NUMBER(S):  [id number]      ORDERING CLINICIAN:  KING OLIVEIRA      FINDINGS:  AP radiograph of the chest was provided.      Relevant clinical history: Hypoxemia with increased airway pressure      Medical devices:ETT with tip at the level of the aniket.      CARDIOMEDIASTINAL SILHOUETTE:  Cardiomediastinal silhouette is enlarged but stable in size and  configuration.      LUNGS:  Bilateral low lung volumes with bronchovascular crowding. Left lower  lung linear opacity representing atelectasis. No pneumothorax.      ABDOMEN:  No upper abdominal findings.      BONES:  No osseous changes.      Impression: 1. ETT tip at the level of the aniket/in the right mainstem. Retract  4-5 cm.  2. Left basilar atelectasis.      MACRO:  I personally reviewed the images/study and I  agree with the findings  as stated by Anirudh Jaimes MD. This study was interpreted at  [facility], [place], [place].      Signed by: Renzo Vergara 7/22/2024 11:53 AM  Dictation workstation:   [id number]  FL fluoro images no charge  These images are not reportable by radiology and will not be interpreted   by  Radiologists.  ECG 12 Lead  Normal sinus rhythm  Nonspecific ST and T wave abnormality  Abnormal ECG  When compared with ECG of 21-JUL-2024 12:00,  No significant change was found      Physical Exam     AOX 3, postop pain+  Rt LE dressing  SIS2  Lungs clear without wheeze    Assessment/Plan      Renee Lombardo is a 73 y.o. female with a history of COPD, DANG on nightly BiPAP, HFpEF, HTN, DVT, GERD, hypothyroidism presenting with L ankle fracture. S/p ORIF    COPD exacerbation- Seen by Pulm preop and started on Prednisone taper  Missed dose this morning but talked to nurse to administer the dose now  Continue Breo Ellipta Inhaler+ Spiriva Inhaler+  Continue Singulair      DANG- Pt should have head end up at 40 dgree postop at all times  Uses Bipap at nights with 2l O2 bleed  RT to ensure mask fit  Would avoid high dose opiates for postop pain control and consider IV NSAIDs for 48hrs pain control  Place pt on continous Pulse ox for 48hrs postop     HFpEF- continue lasix 40mg daily from tmro if no JEWELL by labs       Pt stable on floor and no indication for transfer to Medicine       Mahin Heck MD

## 2024-07-22 NOTE — ANESTHESIA PROCEDURE NOTES
Airway  Date/Time: 7/22/2024 8:02 AM  Urgency: elective    Airway not difficult    Staffing  Performed: CRNA   Authorized by: Franck Lr DO    Performed by: LOURDES Banegas-JOSUE  Patient location during procedure: OR    Indications and Patient Condition  Indications for airway management: anesthesia and airway protection  Spontaneous Ventilation: absent  Sedation level: deep  Preoxygenated: yes  Patient position: sniffing  MILS not maintained throughout  Mask difficulty assessment: 1 - vent by mask  Planned trial extubation    Final Airway Details  Final airway type: endotracheal airway      Successful airway: ETT  Cuffed: yes   Successful intubation technique: direct laryngoscopy  Facilitating devices/methods: intubating stylet  Endotracheal tube insertion site: oral  Blade: Colleen  Blade size: #3  ETT size (mm): 7.0  Cormack-Lehane Classification: grade I - full view of glottis  Placement verified by: chest auscultation and capnometry   Measured from: teeth  ETT to teeth (cm): 21  Number of attempts at approach: 1  Ventilation between attempts: BVM  Number of other approaches attempted: 0    Additional Comments  Ett taped in place, min air to cuff for seal

## 2024-07-22 NOTE — ANESTHESIA PROCEDURE NOTES
Arterial Line:    Date/Time: 7/22/2024 8:39 AM    Staffing  Performed: attending   Authorized by: Franck Lr DO    Performed by: LOURDES Banegas-CRNA    An arterial line was placed. Procedure performed using surface landmarks.in the OR for the following indication(s): continuous blood pressure monitoring and blood sampling needed.    A 20 gauge (size), 1 and 3/8 inch (length), Angiocath (type) catheter was placed into the Right radial artery, secured by Tegaderm and good flow,   Seldinger technique used.  Events:  patient tolerated procedure well with no complications and per dr webb.

## 2024-07-23 ENCOUNTER — APPOINTMENT (OUTPATIENT)
Dept: RADIOLOGY | Facility: HOSPITAL | Age: 74
DRG: 493 | End: 2024-07-23
Payer: MEDICARE

## 2024-07-23 LAB
ATRIAL RATE: 77 BPM
ATRIAL RATE: 77 BPM
ATRIAL RATE: 88 BPM
ATRIAL RATE: 88 BPM
P AXIS: 39 DEGREES
P AXIS: 55 DEGREES
P AXIS: 61 DEGREES
P AXIS: 64 DEGREES
P OFFSET: 201 MS
P OFFSET: 206 MS
P OFFSET: 208 MS
P OFFSET: 214 MS
P ONSET: 141 MS
P ONSET: 143 MS
P ONSET: 151 MS
P ONSET: 153 MS
PR INTERVAL: 148 MS
PR INTERVAL: 148 MS
PR INTERVAL: 166 MS
PR INTERVAL: 170 MS
Q ONSET: 224 MS
Q ONSET: 225 MS
Q ONSET: 227 MS
Q ONSET: 228 MS
QRS COUNT: 13 BEATS
QRS COUNT: 13 BEATS
QRS COUNT: 14 BEATS
QRS COUNT: 14 BEATS
QRS DURATION: 64 MS
QRS DURATION: 72 MS
QT INTERVAL: 374 MS
QT INTERVAL: 390 MS
QT INTERVAL: 392 MS
QT INTERVAL: 414 MS
QTC CALCULATION(BAZETT): 441 MS
QTC CALCULATION(BAZETT): 452 MS
QTC CALCULATION(BAZETT): 468 MS
QTC CALCULATION(BAZETT): 474 MS
QTC FREDERICIA: 423 MS
QTC FREDERICIA: 425 MS
QTC FREDERICIA: 445 MS
QTC FREDERICIA: 449 MS
R AXIS: 22 DEGREES
R AXIS: 30 DEGREES
R AXIS: 35 DEGREES
R AXIS: 52 DEGREES
T AXIS: 58 DEGREES
T AXIS: 60 DEGREES
T AXIS: 61 DEGREES
T AXIS: 78 DEGREES
T OFFSET: 414 MS
T OFFSET: 421 MS
T OFFSET: 423 MS
T OFFSET: 431 MS
VENTRICULAR RATE: 77 BPM
VENTRICULAR RATE: 77 BPM
VENTRICULAR RATE: 88 BPM
VENTRICULAR RATE: 88 BPM

## 2024-07-23 PROCEDURE — 99232 SBSQ HOSP IP/OBS MODERATE 35: CPT | Performed by: ANESTHESIOLOGY

## 2024-07-23 PROCEDURE — 97530 THERAPEUTIC ACTIVITIES: CPT | Mod: GP

## 2024-07-23 PROCEDURE — 2500000001 HC RX 250 WO HCPCS SELF ADMINISTERED DRUGS (ALT 637 FOR MEDICARE OP)

## 2024-07-23 PROCEDURE — 97161 PT EVAL LOW COMPLEX 20 MIN: CPT | Mod: GP

## 2024-07-23 PROCEDURE — 94640 AIRWAY INHALATION TREATMENT: CPT

## 2024-07-23 PROCEDURE — 97116 GAIT TRAINING THERAPY: CPT | Mod: GP

## 2024-07-23 PROCEDURE — 2500000004 HC RX 250 GENERAL PHARMACY W/ HCPCS (ALT 636 FOR OP/ED): Performed by: ANESTHESIOLOGY

## 2024-07-23 PROCEDURE — 94667 MNPJ CHEST WALL 1ST: CPT

## 2024-07-23 PROCEDURE — 97165 OT EVAL LOW COMPLEX 30 MIN: CPT | Mod: GO

## 2024-07-23 PROCEDURE — 1100000001 HC PRIVATE ROOM DAILY

## 2024-07-23 PROCEDURE — 71250 CT THORAX DX C-: CPT

## 2024-07-23 PROCEDURE — 97530 THERAPEUTIC ACTIVITIES: CPT | Mod: GO

## 2024-07-23 PROCEDURE — 2500000004 HC RX 250 GENERAL PHARMACY W/ HCPCS (ALT 636 FOR OP/ED): Mod: JZ

## 2024-07-23 PROCEDURE — 2500000004 HC RX 250 GENERAL PHARMACY W/ HCPCS (ALT 636 FOR OP/ED)

## 2024-07-23 PROCEDURE — 2500000002 HC RX 250 W HCPCS SELF ADMINISTERED DRUGS (ALT 637 FOR MEDICARE OP, ALT 636 FOR OP/ED): Performed by: ANESTHESIOLOGY

## 2024-07-23 PROCEDURE — 2500000001 HC RX 250 WO HCPCS SELF ADMINISTERED DRUGS (ALT 637 FOR MEDICARE OP): Performed by: ANESTHESIOLOGY

## 2024-07-23 PROCEDURE — 71250 CT THORAX DX C-: CPT | Performed by: RADIOLOGY

## 2024-07-23 RX ORDER — ALBUTEROL SULFATE 0.83 MG/ML
2.5 SOLUTION RESPIRATORY (INHALATION) EVERY 6 HOURS
Status: DISPENSED | OUTPATIENT
Start: 2024-07-23 | End: 2024-07-25

## 2024-07-23 ASSESSMENT — COGNITIVE AND FUNCTIONAL STATUS - GENERAL
WALKING IN HOSPITAL ROOM: A LOT
TURNING FROM BACK TO SIDE WHILE IN FLAT BAD: A LITTLE
MOVING TO AND FROM BED TO CHAIR: A LITTLE
MOVING FROM LYING ON BACK TO SITTING ON SIDE OF FLAT BED WITH BEDRAILS: A LITTLE
CLIMB 3 TO 5 STEPS WITH RAILING: A LOT
STANDING UP FROM CHAIR USING ARMS: A LITTLE
DRESSING REGULAR LOWER BODY CLOTHING: A LITTLE
MOBILITY SCORE: 16
HELP NEEDED FOR BATHING: A LITTLE
DAILY ACTIVITIY SCORE: 21
TOILETING: A LITTLE

## 2024-07-23 ASSESSMENT — PAIN - FUNCTIONAL ASSESSMENT
PAIN_FUNCTIONAL_ASSESSMENT: 0-10
PAIN_FUNCTIONAL_ASSESSMENT: 0-10

## 2024-07-23 ASSESSMENT — PAIN SCALES - WONG BAKER: WONGBAKER_NUMERICALRESPONSE: HURTS WHOLE LOT

## 2024-07-23 ASSESSMENT — PAIN SCALES - GENERAL
PAINLEVEL_OUTOF10: 0 - NO PAIN
PAINLEVEL_OUTOF10: 8
PAINLEVEL_OUTOF10: 8
PAINLEVEL_OUTOF10: 3
PAINLEVEL_OUTOF10: 0 - NO PAIN

## 2024-07-23 ASSESSMENT — ACTIVITIES OF DAILY LIVING (ADL)
BATHING_ASSISTANCE: MINIMAL
ADL_ASSISTANCE: INDEPENDENT
ADL_ASSISTANCE: INDEPENDENT

## 2024-07-23 NOTE — PROGRESS NOTES
"Orthopaedic Surgery Progress Note    Subjective: Evaluated in bed this AM. Pain well controlled considering recent surgery. On O2. Denies chest pain, or fevers.    Objective:  /88 (BP Location: Left arm, Patient Position: Lying)   Pulse 84   Temp 36.7 °C (98.1 °F) (Temporal)   Resp 18   Ht 1.448 m (4' 9.01\")   Wt 65.8 kg (145 lb)   SpO2 93%   BMI 31.37 kg/m²     Gen: arousable, NAD, appropriately conversational  Cardiac: RRR to peripheral palpation  Resp: nonlabored on RA  GI: soft, nondistended    MSK:  - ACE dressing c/d/i  - Compartments soft and compressible  - Fires TA/GS/EHL  - SILT in Miller/Sa/SP/DP/T distribution  - Palpable DP pulse    Assessment/Plan: 73 y.o. female s/p removal of ex-fix, L hindfoot fusion nail on 7/22/24 with Dr. Vasquez.      Plan:  - Weight bearing: WBAT LLE  - DVT ppx: SCDs, ASA 81 mg BID  - Diet: Regular  - Pain: Tylenol, oxycodone 5/10  - Antibiotics: perioperative ancef 2g q8hr x3 doses  - Dressing: ACE/kerlix  - FEN: HLIV with good PO intake  - Bowel Regimen: Colace, senna, dulcolax  - PT/OT  - Pulm: Encourage IS, appreciate pulmonology continuing to follow to optimize respiratory status  - Continue home medications  - No mccabe    Dispo: pending PT/OT, postoperative course    This patient will be followed by the Orthopaedic Trauma service. Please page or Epic Chat the corresponding residents below with questions or concerns.     Ortho Trauma Service (Epic Chat Preferred)  First call: Félix Lazo MD PGY-1  First call: Fortunato Goss MD PGY-1  Second call: Renzo Samuels, PGY-2  Third call: Donte Zaragoza PGY-3    6pm-6am M-F, Holidays, and weekends page Ortho on-call @[phone] with urgent questions/concerns.     Renzo Samuels MD  Orthopaedic Surgery PGY-2  On-call pager [phone]  "

## 2024-07-23 NOTE — CARE PLAN
The patient's goals for the shift include keep pain at a tolerable level throughout shift.     The clinical goals for the shift include patient will remain free from falls this shift      Problem: Fall/Injury  Goal: Not fall by end of shift  Outcome: Progressing  Goal: Be free from injury by end of the shift  Outcome: Progressing  Goal: Verbalize understanding of personal risk factors for fall in the hospital  Outcome: Progressing  Goal: Verbalize understanding of risk factor reduction measures to prevent injury from fall in the home  Outcome: Progressing  Goal: Use assistive devices by end of the shift  Outcome: Progressing  Goal: Pace activities to prevent fatigue by end of the shift  Outcome: Progressing     Problem: Pain  Goal: Takes deep breaths with improved pain control throughout the shift  Outcome: Progressing  Goal: Turns in bed with improved pain control throughout the shift  Outcome: Progressing  Goal: Walks with improved pain control throughout the shift  Outcome: Progressing  Goal: Performs ADL's with improved pain control throughout shift  Outcome: Progressing  Goal: Participates in PT with improved pain control throughout the shift  Outcome: Progressing  Goal: Free from opioid side effects throughout the shift  Outcome: Progressing  Goal: Free from acute confusion related to pain meds throughout the shift  Outcome: Progressing     Problem: Pain - Adult  Goal: Verbalizes/displays adequate comfort level or baseline comfort level  Outcome: Progressing     Problem: Safety - Adult  Goal: Free from fall injury  Outcome: Progressing     Problem: Discharge Planning  Goal: Discharge to home or other facility with appropriate resources  Outcome: Progressing     Problem: Chronic Conditions and Co-morbidities  Goal: Patient's chronic conditions and co-morbidity symptoms are monitored and maintained or improved  Outcome: Progressing     Problem: Skin  Goal: Decreased wound size/increased tissue granulation at  next dressing change  Outcome: Progressing  Goal: Participates in plan/prevention/treatment measures  Outcome: Progressing  Goal: Prevent/manage excess moisture  Outcome: Progressing  Goal: Prevent/minimize sheer/friction injuries  Outcome: Progressing  Goal: Promote/optimize nutrition  Outcome: Progressing  Goal: Promote skin healing  Outcome: Progressing

## 2024-07-23 NOTE — PROGRESS NOTES
Occupational Therapy    Evaluation and Treatment    Patient Name: Renee Lombardo  MRN: [id number]  Today's Date: 7/23/2024  Room: [address] [place], [place] [address]  Time Calculation  Start Time: 1219  Stop Time: 1250  Time Calculation (min): 31 min    Assessment  IP OT Assessment  OT Assessment: Pt presents with impaired functional mobility, decreased balance, and impaired ADLs/IADLs. Pt likely to benefit from HIGH intensity OT services to address noted deficits prior to going home as she is IND at baseline  Prognosis: Good  Barriers to Discharge: None  Evaluation/Treatment Tolerance: Patient tolerated treatment well  Medical Staff Made Aware: Yes  End of Session Communication: Bedside nurse, Physician  End of Session Patient Position: Up in chair, Alarm on  Plan:  Inpatient Plan  Treatment Interventions: ADL retraining, Functional transfer training, Equipment evaluation/education, Compensatory technique education  OT Frequency: 4 times per week  OT Discharge Recommendations: High intensity level of continued care  Equipment Recommended upon Discharge:  (TBD)  OT Recommended Transfer Status: Assist of 1  OT - OK to Discharge: Yes  OT Assessment  OT Assessment Results: Decreased ADL status, Decreased functional mobility, Decreased IADLs  Prognosis: Good  Barriers to Discharge: None  Evaluation/Treatment Tolerance: Patient tolerated treatment well  Medical Staff Made Aware: Yes  Strengths: Ability to acquire knowledge, Attitude of self, Coping skills, Support of Caregivers  Barriers to Participation: Insight into problems    Subjective   Current Problem:  1. Closed left ankle fracture, initial encounter  Case Request Operating Room: Open Reduction Internal Fixation Ankle    Case Request Operating Room: Open Reduction Internal Fixation Ankle    Transthoracic Echo (TTE) Complete    Transthoracic Echo (TTE) Complete    Lower extremity venous duplex bilateral    Lower extremity venous duplex bilateral    CANCELED: Case Request Operating Room:  Arthrodesis Ankle    CANCELED: Case Request Operating Room: Arthrodesis Ankle      2. Heart failure, diastolic, due to CAD, unspecified failure chronicity (Multi)  Transthoracic Echo (TTE) Complete    Transthoracic Echo (TTE) Complete      3. Localized swelling, mass and lump, lower limb, bilateral  Lower extremity venous duplex bilateral    Lower extremity venous duplex bilateral      4. Other specified soft tissue disorders  Lower extremity venous duplex bilateral        General:  Reason for Referral: s/p removal of ex-fix, L hindfoot fusion nail on 7/22/24  Past Medical History Relevant to Rehab: COPD on home O2, DANG, CHF, HTN  Prior to Session Communication: Bedside nurse  Patient Position Received: Up in chair, Alarm on  Family/Caregiver Present: No  Caregiver Feedback: Daughter on phone- agitated about mixed reports on WB status. Clarified status with MD and provided appropriate education to daughter and pt from OT perspective.  General Comment: Pt sitting in chair upon arrival. Slightly agitated talking to daughter on phone about WBAT vs NWB. Agreeable to OT eval. Reports frustrations with mixed WB status reports.   Precautions:  LE Weight Bearing Status: Weight Bearing as Tolerated (LLE)  Medical Precautions: Fall precautions, Oxygen therapy device and L/min (2L O2 via NC)  Precautions Comment: Pt and daughter reporting NWB status, clarified WBAT status with Dr. Félix Lazo and educated pt appropriately.    Pain:  Pain Assessment  Pain Assessment: 0-10  0-10 (Numeric) Pain Score: 0 - No pain    Objective   Cognition:  Overall Cognitive Status: Within Functional Limits  Orientation Level: Oriented X4    Home Living:  Type of Home: House  Lives With: Adult children (son (works M-Sat during day))  Home Adaptive Equipment: Walker rolling or standard, Wheelchair-manual  Home Layout: Multi-level, Able to live on main level with bedroom/bathroom  Home Access: Stairs to enter with rails  Entrance Stairs-Rails:  Both  Entrance Stairs-Number of Steps: 3  Bathroom Shower/Tub: Tub/shower unit  Bathroom Toilet: Standard  Bathroom Equipment: Shower chair with back, Hand-held shower hose  Home Living Comments: Pt has basement and second level but does not access, pt resides on main level   Prior Function:  Level of [place]: Independent with ADLs and functional transfers, Independent with homemaking with ambulation  ADL Assistance: Independent  Homemaking Assistance: Independent  Ambulatory Assistance: Independent  Hand Dominance: Ambidextrous (Writes with L and does everything else with R)  Prior Function Comments: IND and active at baseline per report  IADL History:  Homemaking Responsibilities: Yes  Homemaking Comments: Pt is primary homemaker although tasks shared with her son  Current License: Yes  Mode of Transportation: Car  Occupation: Self employed  Type of Occupation: [email or web address]  Leisure and Hobbies: Garage sales, read, music  ADL:  Eating Assistance: Independent (Anticipated)  Grooming Assistance: Modified independent (Device) (Anticipated)  Grooming Deficit: Other (Comment) (seated)  Bathing Assistance: Minimal (Anticipated)  Bathing Deficit: Left lower leg including foot  UE Dressing Assistance: Independent (Anticipated)  LE Dressing Assistance: Minimal (Anticipated)  LE Dressing Deficit: Thread LLE into underwear, Thread LLE into pants, Don/doff L shoe, Don/doff L sock  Toileting Assistance with Device: Minimal (Anticipated)  Toileting Deficit: Clothing management up  Activity Tolerance:  Endurance: Tolerates 30 min exercise with multiple rests  Balance:  Dynamic Sitting Balance  Dynamic Sitting-Comments: SBA  Dynamic Standing Balance  Dynamic Standing-Comments: CGA and SW  Static Sitting Balance  Static Sitting-Comment/Number of Minutes: IND  Static Standing Balance  Static Standing-Comment/Number of Minutes: CGA and SW  Bed Mobility/Transfers: Bed Mobility/Transfers: Bed Mobility  Bed Mobility: No  Functional  Mobility  Functional Mobility Performed: Yes  Functional Mobility 1  Comments 1: Pt took 4 steps forward and 4 steps back with CGA and SW, decreased step length   and Transfers  Transfer: Yes  Transfer 1  Transfer From 1: Chair with arms to, Stand to  Transfer to 1: Stand, Chair with arms  Technique 1: Sit to stand, Stand to sit  Transfer Device 1: Walker  Transfer Level of Assistance 1: Contact guard, Minimal verbal cues  Trials/Comments 1: x1 trial, VCs for sequencing and hand placement  IADL's:   Homemaking Responsibilities: Yes  Homemaking Comments: Pt is primary homemaker although tasks shared with her son  Current License: Yes  Mode of Transportation: Car  Occupation: Self employed  Type of Occupation: [employer]  Leisure and Hobbies: Garage sales, read, music  Vision: Vision - Basic Assessment  Current Vision: Wears glasses only for reading   and Vision - Complex Assessment  Ocular Range of Motion: Within Functional Limits  Sensation:  Light Touch: No apparent deficits (BUE)  Strength:  Strength Comments: BUE WFL- grossly 4/5  Perception:  Inattention/Neglect: Appears intact  Coordination:  Movements are Fluid and Coordinated: Yes   Hand Function:  Hand Function  Gross Grasp: Functional  Coordination: Functional  Extremities:   RUE   RUE : Within Functional Limits, LUE   LUE: Within Functional Limits    Outcome Measures: [facility] Daily Activity  Putting on and taking off regular lower body clothing: A little  Bathing (including washing, rinsing, drying): A little  Putting on and taking off regular upper body clothing: None  Toileting, which includes using toilet, bedpan or urinal: A little  Taking care of personal grooming such as brushing teeth: None  Eating Meals: None  Daily Activity - Total Score: 21    OT Adult Other Outcome Measures  4AT: -    Education Documentation  Body Mechanics, taught by Hansel Vance OT at 7/23/2024  3:17 PM.  Learner: Patient  Readiness: Acceptance  Method: Explanation,  Demonstration  Response: Verbalizes Understanding, Demonstrated Understanding    Precautions, taught by Hansel Vance OT at 7/23/2024  3:17 PM.  Learner: Patient  Readiness: Acceptance  Method: Explanation, Demonstration  Response: Verbalizes Understanding, Demonstrated Understanding    ADL Training, taught by Hansel Vance OT at 7/23/2024  3:17 PM.  Learner: Patient  Readiness: Acceptance  Method: Explanation, Demonstration  Response: Verbalizes Understanding, Demonstrated Understanding    Education Comments  No comments found.      Goals:   Encounter Problems       Encounter Problems (Active)       ADLs       Pt will complete UB /LB bathing tasks with modified independence while seated and AE as needed.        Start:  07/23/24    Expected End:  08/06/24            Pt will complete LB dressing with modified independence while seated and/or standing and AE as needed.        Start:  07/23/24    Expected End:  08/06/24               BALANCE       Pt will maintain dynamic standing balance during ADL task with modified independent level of assistance in order to demonstrate decreased risk of falling and improved postural control.       Start:  07/23/24    Expected End:  08/06/24               COGNITION/SAFETY       Patient will recall and adhere to weight bearing and /or ROM restrictions with all ADL and functional mobility in order to promote healing and safety with functional tasks       Start:  07/23/24    Expected End:  08/06/24               MOBILITY       Patient will perform Functional mobility max Household distances/Community Distances with modified independent level of assistance and least restrictive device in order to improve safety and functional mobility.       Start:  07/23/24    Expected End:  08/06/24               TRANSFERS       Patient will complete sit to stand transfer with modified independent level of assistance and least restrictive device in order to improve safety and prepare  for out of bed mobility.       Start:  07/23/24    Expected End:  08/06/24                   Treatment Completed on Evaluation    Therapy/Activity:     Therapeutic Activity  Therapeutic Activity Performed: Yes  Therapeutic Activity 1: Pt and daughter provided education on WB precautions and safety while performing mobility and ADLs/IADLs.  Therapeutic Activity 2: Pt educated on compensatory mobility techniques and energy conservation techniques in relation to ADLs/IADLs.    07/23/24 at 3:18 PM   Hansel Vance, OT   Rehab Office: [phone]

## 2024-07-23 NOTE — PROGRESS NOTES
Renee Lombardo is a 73 y.o. female on day 8 of admission s/p Lt ankle fracture surgery      Subjective   Feeling better overall, intermittent wheeze but sob stable.       Objective     Last Recorded Vitals  /87 (Patient Position: Sitting)   Pulse 66   Temp 35.4 °C (95.7 °F) (Temporal)   Resp 18   Wt 65.8 kg (145 lb)   SpO2 95%   Intake/Output last 3 Shifts:    Intake/Output Summary (Last 24 hours) at 7/23/2024 1559  Last data filed at 7/23/2024 0300  Gross per 24 hour   Intake --   Output 300 ml   Net -300 ml       Admission Weight  Weight: 65.8 kg (145 lb) (07/16/24 0250)    Daily Weight  07/16/24 : 65.8 kg (145 lb)    Image Results  ECG 12 Lead  Normal sinus rhythm  Low voltage QRS  Septal infarct , age undetermined  Abnormal ECG    Confirmed by Henrry Silverio ([id number]) on 7/23/2024 1:54:13 PM  ECG 12 Lead  Normal sinus rhythm  Low voltage QRS  Septal infarct , age undetermined  Abnormal ECG    Confirmed by Henrry Silverio [id number]) on 7/23/2024 1:54:05 PM  Electrocardiogram, 12-lead PRN ACS symptoms  Normal sinus rhythm  Nonspecific ST abnormality  Abnormal ECG  When compared with ECG of 16-JUL-2024 02:51,  Criteria for Septal infarct are no longer Present  Confirmed by Henrry Silverio [id number]) on 7/23/2024 12:52:37 PM  ECG 12 Lead  Normal sinus rhythm  Nonspecific ST and T wave abnormality  Abnormal ECG  When compared with ECG of 21-JUL-2024 12:00,  No significant change was found  Confirmed by Henrry Silverio [id number]) on 7/23/2024 11:02:58 AM      Physical Exam  AOX 3, postop pain+  Rt LE dressing  SIS2  Lungs clear with scattered wheeze and coarse crackles      Relevant Results  Results for orders placed or performed during the hospital encounter of 07/15/24 (from the past 24 hour(s))   POCT GLUCOSE   Result Value Ref Range    POCT Glucose 154 (H) 74 - 99 mg/dL        Assessment/Plan   Renee Lombardo is a 73 y.o. female with a history of COPD, DANG on nightly BiPAP, HFpEF, HTN, DVT, GERD, hypothyroidism  presenting with L ankle fracture. S/p ORIF     COPD exacerbation- Seen by Pulm preop and started on Prednisone taper  Complete prednisone taper  Continue Breo Ellipta Inhaler+ Spiriva Inhaler+  Continue Singulair  Mild scattered wheeze  Ordered Flutter therapy for coarse crackles and Neb Albuterol q 6hrs scheduled for 48hrs  Upright with head end up as much as possible and out ob bed as much as possible  May need to continue Flutter valve therapy or Bronchial Hygiene routine at DC Rehab/SNF    Chronic Hypoxic Resp failure  Baseline uses O2 at home on exertion, has O2 cylinder, concentrator and portable O2 at home  She uses it PRN at home  Would suggest attempt wean with Sat targets of 88- 92% but if not successful reasonable to stay on 2lnc at DC to SNF/Rehab as she does have Severe COPD and has chronic O2 needs        DANG- Pt should have head end up at 40 dgree postop at all times  Used auto titrate CPAP last night for 4hrs and did well per pt  Uses Bipap at nights with 2l O2 bleed  RT to ensure mask fit  Would avoid high dose opiates for postop pain control and consider IV NSAIDs for 48hrs pain control  Place pt on continous Pulse ox for 48hrs postop      HFpEF- continue lasix 40mg daily from tmro if no JEWELL by labs    Periop Medicine will continue to follow              Mahin Heck MD

## 2024-07-23 NOTE — PROGRESS NOTES
"Physical Therapy    Physical Therapy Evaluation & Treatment    Patient Name: Renee Lombardo  MRN: [id number]  Today's Date: 7/23/2024   Time Calculation  Start Time: 0853  Stop Time: 0947  Time Calculation (min): 54 min    Assessment/Plan   PT Assessment  PT Assessment Results: Decreased strength, Decreased range of motion, Impaired balance, Decreased endurance, Decreased mobility, Pain  Rehab Prognosis: Good  Barriers to Discharge: assist at home; unsteadiness  Evaluation/Treatment Tolerance: Patient tolerated treatment well  Medical Staff Made Aware: Yes  End of Session Communication: Bedside nurse  Assessment Comment: pt POD #1 for removal of ex-fix, L hindfoot fusion nail. pt independent at baseline. now requires Lauren with FWW. pt demonstrates decreased strength, mobiliy and balance which benefits from continued PT.  End of Session Patient Position: Up in chair, Alarm on   IP OR SWING BED PT PLAN  Inpatient or Swing Bed: Inpatient  PT Plan  Treatment/Interventions: Bed mobility, Transfer training, Gait training, Balance training, Stair training, Strengthening, Endurance training, Range of motion, Therapeutic exercise, Therapeutic activity, Home exercise program, Positioning  PT Plan: Ongoing PT  PT Frequency: 5 times per week  PT Discharge Recommendations: High intensity level of continued care  Equipment Recommended upon Discharge: Wheeled walker  PT Recommended Transfer Status: Assist x1  PT - OK to Discharge: Yes      Subjective     General Visit Information:  General  Reason for Referral: s/p removal of ex-fix, L hindfoot fusion nail on 7/22/24  Past Medical History Relevant to Rehab: COPD on home O2, DANG, CHF, HTN  Family/Caregiver Present: No  Prior to Session Communication: Bedside nurse  Patient Position Received: Bed, 3 rail up, Alarm on  General Comment: pt supine. alert and agreeable for PT. extended time taken for gait training 2/2 pt now WBAT to LLE but \"nervous\" to WB through extremity.  Home " Living:  Home Living  Type of Home: House  Lives With: Adult children (son (works M-Sat during day))  Home Adaptive Equipment: None  Home Layout: One level  Home Access: Stairs to enter with rails  Entrance Stairs-Rails: Both  Entrance Stairs-Number of Steps: 3  Bathroom Shower/Tub: Walk-in shower  Bathroom Toilet: Standard  Prior Level of Function:  Prior Function Per Pt/Caregiver Report  Level of [place]: Independent with ADLs and functional transfers  ADL Assistance: Independent  Homemaking Assistance: Independent  Ambulatory Assistance: Independent  Vocational: Full time employment ([employer])  Leisure: + drive  Prior Function Comments: x1 fall  Precautions:  Precautions  LE Weight Bearing Status: Weight Bearing as Tolerated (LLE)  Medical Precautions: Fall precautions, Oxygen therapy device and L/min (2L)  Vital Signs:  Vital Signs  SpO2: 92 % (2L O2)    Objective   Pain:  Pain Assessment  Pain Assessment: 0-10  0-10 (Numeric) Pain Score: 8  Pain Location: Leg  Pain Orientation: Left  Cognition:  Cognition  Overall Cognitive Status: Within Functional Limits  Orientation Level: Oriented X4    General Assessments:     Activity Tolerance  Endurance: Tolerates 30 min exercise with multiple rests    Sensation  Light Touch: No apparent deficits            Perception  Inattention/Neglect: Appears intact      Coordination  Movements are Fluid and Coordinated: Yes    Postural Control  Postural Control: Within Functional Limits    Static Sitting Balance  Static Sitting-Balance Support:  (1-2 UE support)  Static Sitting-Level of Assistance:  (SBA)  Dynamic Sitting Balance  Dynamic Sitting-Balance Support:  (1-2)  Dynamic Sitting-Balance: Trunk control activities  Dynamic Sitting-Comments: SBA    Static Standing Balance  Static Standing-Balance Support: Bilateral upper extremity supported (FWW)  Static Standing-Level of Assistance: Contact guard  Dynamic Standing Balance  Dynamic Standing-Balance Support: Bilateral upper  extremity supported (FWW)  Dynamic Standing-Balance: Turning  Dynamic Standing-Comments: CGA-Lauren  Functional Assessments:  Bed Mobility  Bed Mobility: Yes  Bed Mobility 1  Bed Mobility 1: Supine to sitting  Level of Assistance 1: Contact guard, Minimal verbal cues  Bed Mobility Comments 1: HOB elevated    Transfers  Transfer: Yes  Transfer 1  Transfer From 1: Bed to  Transfer to 1: Stand  Technique 1: Sit to stand  Transfer Device 1: Walker  Transfer Level of Assistance 1: Contact guard, Minimal verbal cues  Trials/Comments 1: x1; VC for safe hand placement  Transfers 2  Transfer From 2: Stand to  Transfer to 2: Commode-standard  Technique 2: Stand to sit  Transfer Device 2: Walker  Transfer Level of Assistance 2: Minimum assistance, Minimal verbal cues  Trials/Comments 2: x1  Transfers 3  Transfer From 3: Commode-standard to  Transfer to 3: Stand  Technique 3: Sit to stand  Transfer Device 3: Walker  Transfer Level of Assistance 3: Minimum assistance, Minimal verbal cues  Trials/Comments 3: x1  Transfers 4  Transfer From 4: Stand to  Transfer to 4: Chair with arms  Technique 4: Stand to sit  Transfer Device 4: Walker  Transfer Level of Assistance 4: Minimum assistance, Minimal verbal cues  Trials/Comments 4: x1  Transfers 5  Transfer From 5: Chair with arms to  Transfer to 5: Sit, Stand  Technique 5: Sit to stand, Stand to sit  Transfer Device 5: Walker  Transfer Level of Assistance 5: Minimum assistance, Minimal verbal cues  Trials/Comments 5: x1    Ambulation/Gait Training  Ambulation/Gait Training Performed: Yes  Ambulation/Gait Training 1  Surface 1: Level tile  Device 1: Rolling walker  Assistance 1: Contact guard, Moderate verbal cues  Quality of Gait 1: Narrow base of support, Inconsistent stride length (pt favored NWB LLE 2/2 fear placing weight through LE despite cues for WBAT)  Comments/Distance (ft) 1: ~3ftx2  Ambulation/Gait Training 2  Surface 2: Level tile  Device 2: Rolling walker  Assistance 2:  Minimum assistance, Moderate verbal cues  Quality of Gait 2: Narrow base of support, Inconsistent stride length, Decreased step length, Antalgic (decreased cadance; unsteady)  Comments/Distance (ft) 2: ~2ft forward/back focusing on WB through LLE. cues for sequencing. noted increased fear of WB/falling    Stairs  Stairs: No  Extremity/Trunk Assessments:  RLE   RLE : Within Functional Limits  LLE   LLE : Exceptions to WFL  AROM LLE (degrees)  LLE AROM Comment: limited ankle DF/PF 2/2 ace wrapping  Strength LLE  LLE Overall Strength: Greater than or equal to 3/5 as evidenced by functional mobility  Treatments:   Pt completed multiple STS CGA-Lauren with FWW and cues for safety. Transferred to [facility] [place] [facility] with CGA and FWW however pt favored NWB to LLE 2/2 fear. Educated pt on new WBAT to LLE. Pt transferred additional 3ft from BSC to chair min A and FWW. Extended seated rest break in chair. Trialed WBAT on LLE with UE support on FWW with mod cues for sequencing, (~2ft forward and back). Ended session in chair with needs met.   Outcome Measures:  [facility] Basic Mobility  Turning from your back to your side while in a flat bed without using bedrails: A little  Moving from lying on your back to sitting on the side of a flat bed without using bedrails: A little  Moving to and from bed to chair (including a wheelchair): A little  Standing up from a chair using your arms (e.g. wheelchair or bedside chair): A little  To walk in hospital room: A lot  Climbing 3-5 steps with railing: A lot  Basic Mobility - Total Score: 16    Encounter Problems       Encounter Problems (Active)       Mobility       STG - Patient will ambulate >/= 100 ft with SBA and FWW (Progressing)       Start:  07/23/24    Expected End:  08/06/24            STG - Patient will ascend and descend four to six stairs CGA (Progressing)       Start:  07/23/24    Expected End:  08/06/24               PT Transfers       STG - Transfer from bed to chair SBA and FWW  (Progressing)       Start:  07/23/24    Expected End:  08/06/24            STG - Patient will perform bed mobility SBA (Progressing)       Start:  07/23/24    Expected End:  08/06/24            STG - Patient will transfer sit to and from stand SBA and FWW (Progressing)       Start:  07/23/24    Expected End:  08/06/24               Pain - Adult              Education Documentation  Precautions, taught by Radha Magallanes, PT at 7/23/2024 12:52 PM.  Learner: Patient  Readiness: Acceptance  Method: Explanation  Response: Verbalizes Understanding    Body Mechanics, taught by Radha Magallanes PT at 7/23/2024 12:52 PM.  Learner: Patient  Readiness: Acceptance  Method: Explanation  Response: Verbalizes Understanding    Mobility Training, taught by Radha Magallanes PT at 7/23/2024 12:52 PM.  Learner: Patient  Readiness: Acceptance  Method: Explanation  Response: Verbalizes Understanding    Education Comments  No comments found.      07/23/24 at 12:53 PM - Radha Magallanes, PT

## 2024-07-24 LAB
ANION GAP SERPL CALC-SCNC: 16 MMOL/L (ref 10–20)
BUN SERPL-MCNC: 27 MG/DL (ref 6–23)
CALCIUM SERPL-MCNC: 9.3 MG/DL (ref 8.6–10.6)
CHLORIDE SERPL-SCNC: 101 MMOL/L (ref 98–107)
CO2 SERPL-SCNC: 22 MMOL/L (ref 21–32)
CREAT SERPL-MCNC: 0.67 MG/DL (ref 0.5–1.05)
EGFRCR SERPLBLD CKD-EPI 2021: >90 ML/MIN/1.73M*2
GLUCOSE SERPL-MCNC: 174 MG/DL (ref 74–99)
POTASSIUM SERPL-SCNC: 4.2 MMOL/L (ref 3.5–5.3)
SODIUM SERPL-SCNC: 135 MMOL/L (ref 136–145)

## 2024-07-24 PROCEDURE — 99231 SBSQ HOSP IP/OBS SF/LOW 25: CPT

## 2024-07-24 PROCEDURE — 2500000002 HC RX 250 W HCPCS SELF ADMINISTERED DRUGS (ALT 637 FOR MEDICARE OP, ALT 636 FOR OP/ED): Performed by: ANESTHESIOLOGY

## 2024-07-24 PROCEDURE — 97530 THERAPEUTIC ACTIVITIES: CPT | Mod: GP,CQ

## 2024-07-24 PROCEDURE — 94640 AIRWAY INHALATION TREATMENT: CPT

## 2024-07-24 PROCEDURE — 2500000001 HC RX 250 WO HCPCS SELF ADMINISTERED DRUGS (ALT 637 FOR MEDICARE OP)

## 2024-07-24 PROCEDURE — 80048 BASIC METABOLIC PNL TOTAL CA: CPT | Performed by: ORTHOPAEDIC SURGERY

## 2024-07-24 PROCEDURE — 2500000001 HC RX 250 WO HCPCS SELF ADMINISTERED DRUGS (ALT 637 FOR MEDICARE OP): Performed by: ANESTHESIOLOGY

## 2024-07-24 PROCEDURE — 36415 COLL VENOUS BLD VENIPUNCTURE: CPT | Performed by: ORTHOPAEDIC SURGERY

## 2024-07-24 PROCEDURE — 1100000001 HC PRIVATE ROOM DAILY

## 2024-07-24 PROCEDURE — 2500000004 HC RX 250 GENERAL PHARMACY W/ HCPCS (ALT 636 FOR OP/ED)

## 2024-07-24 PROCEDURE — 94668 MNPJ CHEST WALL SBSQ: CPT

## 2024-07-24 PROCEDURE — 94660 CPAP INITIATION&MGMT: CPT

## 2024-07-24 PROCEDURE — 2500000004 HC RX 250 GENERAL PHARMACY W/ HCPCS (ALT 636 FOR OP/ED): Performed by: ANESTHESIOLOGY

## 2024-07-24 ASSESSMENT — PAIN SCALES - GENERAL
PAINLEVEL_OUTOF10: 0 - NO PAIN
PAINLEVEL_OUTOF10: 9
PAINLEVEL_OUTOF10: 0 - NO PAIN

## 2024-07-24 ASSESSMENT — COGNITIVE AND FUNCTIONAL STATUS - GENERAL
MOVING TO AND FROM BED TO CHAIR: A LITTLE
WALKING IN HOSPITAL ROOM: A LOT
MOBILITY SCORE: 15
MOVING FROM LYING ON BACK TO SITTING ON SIDE OF FLAT BED WITH BEDRAILS: A LITTLE
CLIMB 3 TO 5 STEPS WITH RAILING: TOTAL
STANDING UP FROM CHAIR USING ARMS: A LITTLE
TURNING FROM BACK TO SIDE WHILE IN FLAT BAD: A LITTLE

## 2024-07-24 ASSESSMENT — PAIN DESCRIPTION - DESCRIPTORS: DESCRIPTORS: BURNING

## 2024-07-24 ASSESSMENT — PAIN - FUNCTIONAL ASSESSMENT: PAIN_FUNCTIONAL_ASSESSMENT: 0-10

## 2024-07-24 NOTE — PROGRESS NOTES
"Renee Lombardo is a 73 y.o. female on day 9 of admission presenting with Closed left ankle fracture, initial encounter.    Subjective   Renee Lombardo is a 73 y.o. female presenting with history of chronic hypoxemic respiratory failure (on home O2 2 LPM via NC with exertion and at nighttime), severe COPD secondary to tobacco use, bronchiectasis, sub-centimeter pulmonary nodules, mild to moderate obstructive sleep apnea (on BIPAP at nighttime), chronic allergic rhinitis, chronic diastolic CHF, anxiety, hypertension, acid reflux disease, hypothyroidism, and OA S/P left hip replacement surgery, who is hospitalized for ORIF L ankle.     Currently patient was seen and examined at bedside this AM. Wheezing is continuously improving, with no new complaints. She denies any cough, sob, increased sputum production or chest pain.     Objective     Physical Exam  Constitutional:       General: She is not in acute distress.     Appearance: Normal appearance. She is not ill-appearing.   Cardiovascular:      Heart sounds: Normal heart sounds. No murmur heard.     No friction rub. No gallop.   Pulmonary:      Effort: No respiratory distress.      Breath sounds: Wheezing present. No decreased breath sounds.   Abdominal:      General: Bowel sounds are normal.      Palpations: Abdomen is soft.      Tenderness: There is no abdominal tenderness.   Musculoskeletal:         General: Signs of injury present.      Comments: LLE wrapped            Last Recorded Vitals  Blood pressure 123/82, pulse 77, temperature 36.6 °C (97.9 °F), temperature source Temporal, resp. rate 18, height 1.448 m (4' 9.01\"), weight 65.8 kg (145 lb), SpO2 93%.  Intake/Output last 3 Shifts:  I/O last 3 completed shifts:  In: - (0 mL/kg)   Out: 900 (13.7 mL/kg) [Urine:900 (0.4 mL/kg/hr)]  Weight: 65.8 kg                    Assessment/Plan   Principal Problem:    Closed left ankle fracture, initial encounter  Active Problems:    Chronic obstructive pulmonary disease " (Multi)    DANG (obstructive sleep apnea)    CHF (congestive heart failure) (Multi)    Gastroesophageal reflux disease    HTN (hypertension)    DVT (deep venous thrombosis) (Multi)    Obesity    Anemia    Renee Lombardo is a 73 y.o. female presenting with pmh of chronic hypoxemic respiratory failure (on home O2 2 LPM via NC with exertion and at nighttime), severe COPD secondary to tobacco use, bronchiectasis, chronic allergic rhinitis, chronic diastolic CHF, anxiety, hypertension, acid reflux disease, hypothyroidism, and OA, who is hospitalized for ORIF L ankle. Pulmonology was consulted for increase in wheezing with a history of COPD and exacerbations In addition to a R lower lobe collapse finding on CT imaging from 9/15/24.     # Chronic obstructive pulmonary disease exacerbation   : Symptoms of congestion, episode of cough with sputum production- Improving  : Similar episodes in past that resolved with increased management  Plan  - Tapered Prednisone 30 mg PO, 20 mg PO, 10 mg PO.   - Continue home medication- Trelegy, albuterol    # Chronic obstructive pulmonary disease  : Previously diagnosed   Plan  - Continue home medication  - Trelegy equivalent   - Albuterol     # DANG  : Previously diagnosed   Plan:;   - Home CPAP or BiPAP       Recommendations   - Continue COPD management  - Prednisone Taper  - Spiriva  + Breo Ellipta   - Albuterol   - Continue CPAP/BiPAP  - Patient is improving with mild wheezing, tolerating prednisone taper and Trelegy equivalent.   - Spoke with patient in regards to scheduling outpatient appointment with primary pulmonologist  - Pulmonology will sign off      Torri Joya MD

## 2024-07-24 NOTE — CARE PLAN
The patient's goals for the shift include get sleep    The clinical goals for the shift include Patient's pain will remain within patient's acceptable limits this shift.      Problem: Fall/Injury  Goal: Not fall by end of shift  Outcome: Progressing  Goal: Be free from injury by end of the shift  Outcome: Progressing  Goal: Verbalize understanding of personal risk factors for fall in the hospital  Outcome: Progressing  Goal: Verbalize understanding of risk factor reduction measures to prevent injury from fall in the home  Outcome: Progressing  Goal: Use assistive devices by end of the shift  Outcome: Progressing  Goal: Pace activities to prevent fatigue by end of the shift  Outcome: Progressing     Problem: Pain  Goal: Takes deep breaths with improved pain control throughout the shift  Outcome: Progressing  Goal: Turns in bed with improved pain control throughout the shift  Outcome: Progressing  Goal: Walks with improved pain control throughout the shift  Outcome: Progressing  Goal: Performs ADL's with improved pain control throughout shift  Outcome: Progressing  Goal: Participates in PT with improved pain control throughout the shift  Outcome: Progressing  Goal: Free from opioid side effects throughout the shift  Outcome: Progressing  Goal: Free from acute confusion related to pain meds throughout the shift  Outcome: Progressing     Problem: Pain - Adult  Goal: Verbalizes/displays adequate comfort level or baseline comfort level  Outcome: Progressing     Problem: Safety - Adult  Goal: Free from fall injury  Outcome: Progressing

## 2024-07-24 NOTE — PROGRESS NOTES
"Orthopaedic Surgery Progress Note    Subjective: NAEO. Doing well this AM. Doing breathing treatment without concerns.    Objective:  /83   Pulse 64   Temp 36 °C (96.8 °F) (Temporal)   Resp 14   Ht 1.448 m (4' 9.01\")   Wt 65.8 kg (145 lb)   SpO2 97%   BMI 31.37 kg/m²     Gen: arousable, NAD, appropriately conversational  Cardiac: RRR to peripheral palpation  Resp: nonlabored on RA  GI: soft, nondistended    MSK:  - ACE dressing c/d/i  - Compartments soft and compressible  - Fires TA/GS/EHL  - SILT in Miller/Sa/SP/DP/T distribution  - Palpable DP pulse    Assessment/Plan: 73 y.o. female s/p removal of ex-fix, L hindfoot fusion nail on 7/22/24 with Dr. Vasquez.      Plan:  - Weight bearing: NWB LLE  - DVT ppx: SCDs, ASA 81 mg BID  - Diet: Regular  - Pain: Tylenol, oxycodone 5/10  - Antibiotics: perioperative ancef 2g q8hr x3 doses  - Dressing: ACE/kerlix  - FEN: HLIV with good PO intake  - Bowel Regimen: Colace, senna, dulcolax  - PT/OT  - Pulm: Encourage IS, appreciate pulmonology continuing to follow to optimize respiratory status  - Continue home medications  - No mccabe    Dispo: pending PT/OT, anticipate SNF    This patient will be followed by the Orthopaedic Trauma service. Please page or Epic Chat the corresponding residents below with questions or concerns.     Ortho Trauma Service (Epic Chat Preferred)  First call: Félix Lazo MD PGY-1  First call: Fortunato Goss MD PGY-1  Second call: Renzo Samuels PGY-2  Third call: Donte Zaragoza, PGY-3    Félix Lazo MD  Orthopaedic Surgery PGY-1  [facility]  Available by Epic Chat    Please reach out to the orthopaedic on-call ANGIE or resident (please refer to Qgenda)      On weekends and after 6PM:  At [facility]: Please reach out to the orthopaedic on-call resident ([phone])  At [facility]: Please reach out to the orthopaedic on-call ANGIE or resident (please refer to Qgenda)  "

## 2024-07-24 NOTE — PROGRESS NOTES
Physical Therapy    Physical Therapy Treatment    Patient Name: Renee Lombardo  MRN: [id number]  Today's Date: 7/24/2024  Room: [address] [place] [address]  Time Calculation  Start Time: 0943  Stop Time: 1009  Time Calculation (min): 26 min       Assessment/Plan   PT Assessment  PT Assessment Results: Decreased strength, Decreased range of motion, Impaired balance, Decreased endurance, Decreased mobility, Pain  Rehab Prognosis: Good  Barriers to Discharge: assist at home; unsteadiness  Evaluation/Treatment Tolerance: Patient limited by pain  End of Session Communication: Bedside nurse  End of Session Patient Position: Up in chair, Alarm on     PT Plan  Treatment/Interventions: Bed mobility, Transfer training, Gait training, Balance training, Stair training, Strengthening, Endurance training, Range of motion, Therapeutic exercise, Therapeutic activity, Home exercise program, Positioning  PT Plan: Ongoing PT  PT Frequency: 5 times per week  PT Discharge Recommendations: High intensity level of continued care  Equipment Recommended upon Discharge: Wheeled walker  PT Recommended Transfer Status: Assist x1  PT - OK to Discharge: Yes  Assessment: Pt declined ambulation today d/t pain. Unable to get any foot clearance with transfer d/t inability to WB on LLE. Reviewed/educated on new WB status. Pt appears anxious/frustrated with WB change. Would continue to benefit from continued skilled PT to address all mobility deficits; remains appropriate for HIGH intensity therapy when medically appropriate for discharge from acute stay.  Will continue to follow.     General Visit Information:   PT  Visit  PT Received On: 07/24/24  Prior to Session Communication: Bedside nurse  Patient Position Received:  (up on commode)     Subjective   Subjective: Pt up on commode, reporting increased LE pain.   Precautions:  Precautions  LE Weight Bearing Status: Weight Bearing as Tolerated (LLE)  Medical Precautions: Fall precautions  Vital Signs:       Objective    Pain:  Pain Assessment  Pain Assessment: 0-10  0-10 (Numeric) Pain Score: 9  Pain Type: Surgical pain  Pain Location: Ankle  Pain Orientation: Left  Pain Descriptors: Burning  Pain Interventions: Medication (See MAR)  Cognition:  Cognition  Orientation Level: Oriented X4  Insight: Mild  Impulsive: Mildly     Static Sitting balance:  Static Sitting Balance  Static Sitting-Balance Support: Feet supported  Static Sitting-Level of Assistance: Distant supervision  Static Standing Balance:  Static Standing Balance  Static Standing-Balance Support: Bilateral upper extremity supported  Static Standing-Level of Assistance: Contact guard  Static Standing-Comment/Number of Minutes: 2min      PT Treatments:     Therapeutic Activity  Therapeutic Activity 1: Pt still expresses frustration from change in WB status from last date. Pt educated and reviewed new WB status. Encouraged to avoid twising on RLE since pt is not able to WB at all thru LLE d/t pain. Pt reports she is unable to hop, states fear of fx with her osteoporosis.           Transfer 1  Transfer From 1: Commode-standard to  Transfer to 1: Stand  Technique 1: Sit to stand, Stand to sit  Transfer Device 1: Walker  Transfer Level of Assistance 1: Contact guard, Minimal verbal cues  Trials/Comments 1: x1, vc for handplacement  Transfers 2  Transfer From 2: Stand to  Transfer to 2: Chair with arms  Technique 2: Stand pivot  Transfer Device 2: Walker  Transfer Level of Assistance 2: Contact guard  Trials/Comments 2: x1, mod vc to attempt WB on LLE, pt declined. Unable to get foot clearance on either LE       Activity tolerance:  Activity Tolerance  Endurance: Decreased tolerance for upright activites    Outcome Measures:  [facility] Basic Mobility  Turning from your back to your side while in a flat bed without using bedrails: A little  Moving from lying on your back to sitting on the side of a flat bed without using bedrails: A little  Moving to and from bed to chair  (including a wheelchair): A little  Standing up from a chair using your arms (e.g. wheelchair or bedside chair): A little  To walk in hospital room: A lot  Climbing 3-5 steps with railing: Total  Basic Mobility - Total Score: 15            Education Documentation  Body Mechanics, taught by Clare Martinez PTA at 7/24/2024 10:25 AM.  Learner: Patient  Readiness: Acceptance  Method: Explanation  Response: Needs Reinforcement, Verbalizes Understanding    Precautions, taught by Clare Martinez PTA at 7/24/2024 10:25 AM.  Learner: Patient  Readiness: Acceptance  Method: Explanation  Response: Needs Reinforcement, Verbalizes Understanding    ADL Training, taught by Clare Martinez PTA at 7/24/2024 10:25 AM.  Learner: Patient  Readiness: Acceptance  Method: Explanation  Response: Needs Reinforcement, Verbalizes Understanding    Precautions, taught by Clare Martinez PTA at 7/24/2024 10:25 AM.  Learner: Patient  Readiness: Acceptance  Method: Explanation  Response: Needs Reinforcement, Verbalizes Understanding    Body Mechanics, taught by Clare Martinez PTA at 7/24/2024 10:25 AM.  Learner: Patient  Readiness: Acceptance  Method: Explanation  Response: Needs Reinforcement, Verbalizes Understanding    Mobility Training, taught by Clare Martinez PTA at 7/24/2024 10:25 AM.  Learner: Patient  Readiness: Acceptance  Method: Explanation  Response: Needs Reinforcement, Verbalizes Understanding    Education Comments  No comments found.          OP EDUCATION:       Encounter Problems       Encounter Problems (Active)       Mobility       STG - Patient will ambulate >/= 100 ft with SBA and FWW (Progressing)       Start:  07/23/24    Expected End:  08/06/24            STG - Patient will ascend and descend four to six stairs CGA (Progressing)       Start:  07/23/24    Expected End:  08/06/24               PT Transfers       STG - Transfer from bed to chair SBA and FWW (Progressing)       Start:  07/23/24    Expected End:  08/06/24            STG -  Patient will perform bed mobility SBA (Progressing)       Start:  07/23/24    Expected End:  08/06/24            STG - Patient will transfer sit to and from stand SBA and FWW (Progressing)       Start:  07/23/24    Expected End:  08/06/24               Pain - Adult

## 2024-07-25 PROCEDURE — 2500000001 HC RX 250 WO HCPCS SELF ADMINISTERED DRUGS (ALT 637 FOR MEDICARE OP)

## 2024-07-25 PROCEDURE — 94668 MNPJ CHEST WALL SBSQ: CPT

## 2024-07-25 PROCEDURE — 94660 CPAP INITIATION&MGMT: CPT

## 2024-07-25 PROCEDURE — 94640 AIRWAY INHALATION TREATMENT: CPT

## 2024-07-25 PROCEDURE — 2500000004 HC RX 250 GENERAL PHARMACY W/ HCPCS (ALT 636 FOR OP/ED): Performed by: ANESTHESIOLOGY

## 2024-07-25 PROCEDURE — 1100000001 HC PRIVATE ROOM DAILY

## 2024-07-25 PROCEDURE — 2500000001 HC RX 250 WO HCPCS SELF ADMINISTERED DRUGS (ALT 637 FOR MEDICARE OP): Performed by: ANESTHESIOLOGY

## 2024-07-25 ASSESSMENT — COGNITIVE AND FUNCTIONAL STATUS - GENERAL
MOVING FROM LYING ON BACK TO SITTING ON SIDE OF FLAT BED WITH BEDRAILS: A LITTLE
MOVING TO AND FROM BED TO CHAIR: A LITTLE
TURNING FROM BACK TO SIDE WHILE IN FLAT BAD: A LITTLE
STANDING UP FROM CHAIR USING ARMS: A LITTLE
WALKING IN HOSPITAL ROOM: A LOT
CLIMB 3 TO 5 STEPS WITH RAILING: TOTAL
TOILETING: A LITTLE
DRESSING REGULAR LOWER BODY CLOTHING: A LITTLE
MOBILITY SCORE: 15
HELP NEEDED FOR BATHING: A LITTLE
DAILY ACTIVITIY SCORE: 21

## 2024-07-25 ASSESSMENT — PAIN SCALES - GENERAL
PAINLEVEL_OUTOF10: 7
PAINLEVEL_OUTOF10: 0 - NO PAIN
PAINLEVEL_OUTOF10: 4

## 2024-07-25 ASSESSMENT — PAIN - FUNCTIONAL ASSESSMENT
PAIN_FUNCTIONAL_ASSESSMENT: 0-10
PAIN_FUNCTIONAL_ASSESSMENT: 0-10

## 2024-07-25 NOTE — PROGRESS NOTES
"Orthopaedic Surgery Progress Note    Subjective: NAEO. Doing well this AM. Doing breathing treatment without concerns.    Objective:  /83   Pulse 77   Temp 36.1 °C (97 °F) (Temporal)   Resp 16   Ht 1.448 m (4' 9.01\")   Wt 65.8 kg (145 lb)   SpO2 95%   BMI 31.37 kg/m²     Gen: arousable, NAD, appropriately conversational  Cardiac: RRR to peripheral palpation  Resp: nonlabored on RA  GI: soft, nondistended    MSK:  - ACE dressing c/d/i  - Compartments soft and compressible  - Fires TA/GS/EHL  - SILT in Miller/Sa/SP/DP/T distribution  - Palpable DP pulse    Assessment/Plan: 73 y.o. female s/p removal of ex-fix, L hindfoot fusion nail on 7/22/24 with Dr. Vasquez.      Plan:  - Weight bearing: NWB LLE  - DVT ppx: SCDs, ASA 81 mg BID  - Diet: Regular  - Pain: Tylenol, oxycodone 5/10  - Antibiotics: perioperative ancef 2g q8hr x3 doses  - Dressing: ACE/kerlix  - FEN: HLIV with good PO intake  - Bowel Regimen: Colace, senna, dulcolax  - PT/OT  - Pulm: Encourage IS, appreciate pulmonology continuing to follow to optimize respiratory status  - Continue home medications  - No mccabe    Dispo: pending PT/OT, anticipate SNF    This patient will be followed by the Orthopaedic Trauma service. Please page or Epic Chat the corresponding residents below with questions or concerns.     Ortho Trauma Service ([employer] Chat Preferred)  First call: Félix Lazo MD PGY-1  First call: Fortunato Goss MD PGY-1  Second call: Renzo Samuels PGY-2  Third call: Donte Zaragoza, PGY-3    This patient will be followed by the Orthopaedic Trauma service. Please page or Epic Chat the corresponding residents below with questions or concerns.     Ortho Trauma Service ([employer] Preferred)  First call: Félix Lazo MD PGY-1  First call: Fortunato Goss MD PGY-1  Second call: Renzo Samuels PGY-2  Third call: Donte Zaragoza PGY-3    6pm-6am M-F, Holidays, and weekends page Ortho on-call @[phone] with urgent questions/concerns.     Renzo Samuels, " MD  Orthopaedic Surgery PGY-2  On-call pager [phone]

## 2024-07-25 NOTE — PROGRESS NOTES
SW received vm from daughter stating patient and family prefer SNF instead of acute rehab. SW sent SNF list to daughter. TCC and MD notified. Pending choices. SW will continue to follow.    1410-Daughter provided 5 SNF choices. 1st choice was accidentally deleted; SW called daughter to get the 1st choice again. Daughter became states she did not see [facility] on the list. SW updated daughter that is because [facility] is a rehab and the wanted a SNF list. Daughter reports she doesn't know what she wants. SW reitereated PT recs high intensity but patient and daughter reported they wanted SNF. Daughter hung up on SW. SW spoke with patient. SW updated TCC to notified too to follow up as this SW will not be contacting daughter further for discharge needs. Referral sent to 4 choices daughter sent.    ANIL Pena

## 2024-07-25 NOTE — CARE PLAN
Problem: Fall/Injury  Goal: Not fall by end of shift  Outcome: Progressing  Goal: Be free from injury by end of the shift  Outcome: Progressing  Goal: Verbalize understanding of personal risk factors for fall in the hospital  Outcome: Progressing  Goal: Verbalize understanding of risk factor reduction measures to prevent injury from fall in the home  Outcome: Progressing  Goal: Use assistive devices by end of the shift  Outcome: Progressing  Goal: Pace activities to prevent fatigue by end of the shift  Outcome: Progressing     Problem: Pain  Goal: Takes deep breaths with improved pain control throughout the shift  Outcome: Progressing  Goal: Turns in bed with improved pain control throughout the shift  Outcome: Progressing  Goal: Walks with improved pain control throughout the shift  Outcome: Progressing  Goal: Performs ADL's with improved pain control throughout shift  Outcome: Progressing  Goal: Participates in PT with improved pain control throughout the shift  Outcome: Progressing  Goal: Free from opioid side effects throughout the shift  Outcome: Progressing  Goal: Free from acute confusion related to pain meds throughout the shift  Outcome: Progressing     Problem: Pain - Adult  Goal: Verbalizes/displays adequate comfort level or baseline comfort level  Outcome: Progressing     Problem: Safety - Adult  Goal: Free from fall injury  Outcome: Progressing     Problem: Discharge Planning  Goal: Discharge to home or other facility with appropriate resources  Outcome: Progressing     Problem: Chronic Conditions and Co-morbidities  Goal: Patient's chronic conditions and co-morbidity symptoms are monitored and maintained or improved  Outcome: Progressing     Problem: Skin  Goal: Decreased wound size/increased tissue granulation at next dressing change  Outcome: Progressing  Goal: Participates in plan/prevention/treatment measures  Outcome: Progressing  Goal: Prevent/manage excess moisture  Outcome: Progressing  Goal:  Prevent/minimize sheer/friction injuries  Outcome: Progressing  Goal: Promote/optimize nutrition  Outcome: Progressing  Goal: Promote skin healing  Outcome: Progressing   The patient's goals for the shift include get sleep    The clinical goals for the shift include patient will be safe from falls this shift

## 2024-07-26 PROCEDURE — 2500000001 HC RX 250 WO HCPCS SELF ADMINISTERED DRUGS (ALT 637 FOR MEDICARE OP)

## 2024-07-26 PROCEDURE — 94668 MNPJ CHEST WALL SBSQ: CPT

## 2024-07-26 PROCEDURE — 94640 AIRWAY INHALATION TREATMENT: CPT

## 2024-07-26 PROCEDURE — 2500000004 HC RX 250 GENERAL PHARMACY W/ HCPCS (ALT 636 FOR OP/ED): Performed by: ANESTHESIOLOGY

## 2024-07-26 PROCEDURE — 94660 CPAP INITIATION&MGMT: CPT

## 2024-07-26 PROCEDURE — 2500000004 HC RX 250 GENERAL PHARMACY W/ HCPCS (ALT 636 FOR OP/ED)

## 2024-07-26 PROCEDURE — 1100000001 HC PRIVATE ROOM DAILY

## 2024-07-26 PROCEDURE — 2500000001 HC RX 250 WO HCPCS SELF ADMINISTERED DRUGS (ALT 637 FOR MEDICARE OP): Performed by: ANESTHESIOLOGY

## 2024-07-26 RX ORDER — FUROSEMIDE 40 MG/1
40 TABLET ORAL DAILY
Status: DISCONTINUED | OUTPATIENT
Start: 2024-07-26 | End: 2024-07-29 | Stop reason: HOSPADM

## 2024-07-26 ASSESSMENT — PAIN - FUNCTIONAL ASSESSMENT
PAIN_FUNCTIONAL_ASSESSMENT: 0-10

## 2024-07-26 ASSESSMENT — COGNITIVE AND FUNCTIONAL STATUS - GENERAL
DRESSING REGULAR LOWER BODY CLOTHING: A LITTLE
DAILY ACTIVITIY SCORE: 21
MOVING FROM LYING ON BACK TO SITTING ON SIDE OF FLAT BED WITH BEDRAILS: A LITTLE
STANDING UP FROM CHAIR USING ARMS: A LITTLE
WALKING IN HOSPITAL ROOM: A LOT
HELP NEEDED FOR BATHING: A LITTLE
MOVING TO AND FROM BED TO CHAIR: A LITTLE
TOILETING: A LITTLE
CLIMB 3 TO 5 STEPS WITH RAILING: TOTAL
TURNING FROM BACK TO SIDE WHILE IN FLAT BAD: A LITTLE
MOBILITY SCORE: 15

## 2024-07-26 ASSESSMENT — PAIN SCALES - GENERAL
PAINLEVEL_OUTOF10: 7
PAINLEVEL_OUTOF10: 4
PAINLEVEL_OUTOF10: 7

## 2024-07-26 NOTE — CARE PLAN
The patient's goals for the shift include get sleep    The clinical goals for the shift include Patient will remain comfortable throughout shift.      Problem: Fall/Injury  Goal: Not fall by end of shift  Outcome: Progressing     Problem: Fall/Injury  Goal: Be free from injury by end of the shift  Outcome: Progressing     Problem: Pain  Goal: Performs ADL's with improved pain control throughout shift  Outcome: Progressing     Problem: Pain - Adult  Goal: Verbalizes/displays adequate comfort level or baseline comfort level  Outcome: Progressing     Problem: Safety - Adult  Goal: Free from fall injury  Outcome: Progressing     Problem: Chronic Conditions and Co-morbidities  Goal: Patient's chronic conditions and co-morbidity symptoms are monitored and maintained or improved  Outcome: Progressing     Problem: Skin  Goal: Prevent/manage excess moisture  Outcome: Progressing     Problem: Skin  Goal: Prevent/minimize sheer/friction injuries  Outcome: Progressing

## 2024-07-26 NOTE — PROGRESS NOTES
"Orthopaedic Surgery Progress Note    Subjective: NAEO. Doing well this AM. Doing breathing treatment without concerns. SW spoke with family yesterday - planning for safe DC to SNF once approved. Patient and family in agreement.     Objective:  /77 (BP Location: Left arm, Patient Position: Lying)   Pulse 76   Temp 36.5 °C (97.7 °F) (Temporal)   Resp 21   Ht 1.448 m (4' 9.01\")   Wt 65.8 kg (145 lb)   SpO2 93%   BMI 31.37 kg/m²     Gen: arousable, NAD, appropriately conversational  Cardiac: RRR to peripheral palpation  Resp: nonlabored on RA  GI: soft, nondistended    MSK:  - ACE dressing c/d/i  - Compartments soft and compressible  - Fires TA/GS/EHL  - SILT in Miller/Sa/SP/DP/T distribution  - Palpable DP pulse    Assessment/Plan: 73 y.o. female s/p removal of ex-fix, L hindfoot fusion nail on 7/22/24 with Dr. Vasquez.      Plan:  - Weight bearing: NWB LLE  - DVT ppx: SCDs, ASA 81 mg BID  - Diet: Regular  - Pain: Tylenol, oxycodone 5/10  - Antibiotics: perioperative ancef 2g q8hr x3 doses  - Dressing: ACE/kerlix  - FEN: HLIV with good PO intake  - Bowel Regimen: Colace, senna, dulcolax  - PT/OT  - Pulm: Encourage IS, appreciate pulmonology continuing to follow to optimize respiratory status  - Continue home medications  - No mccabe    Dispo: pending PT/OT, anticipate SNF    This patient will be followed by the Orthopaedic Trauma service. Please page or Epic Chat the corresponding residents below with questions or concerns.     Ortho Trauma Service (Epic Chat Preferred)  First call: Félix Lazo MD PGY-1  First call: Fortunato Goss MD PGY-1  Second call: Renzo Samuels PGY-2  Third call: Donte Zaragoza PGY-3    This patient will be followed by the Orthopaedic Trauma service. Please page or Epic Chat the corresponding residents below with questions or concerns.     Ortho Trauma Service (Epic Chat Preferred)  First call: Félix Lazo MD PGY-1  First call: Fortunato Goss MD PGY-1  Second call: Renzo Samuels, " PGY-2  Third call: Donte Zaragoza, PGY-3    6pm-6am M-F, Holidays, and weekends page Ortho on-call @[phone] with urgent questions/concerns.     Renzo Samuels MD  Orthopaedic Surgery PGY-2  On-call pager [phone]

## 2024-07-26 NOTE — CARE PLAN
Problem: Fall/Injury  Goal: Not fall by end of shift  Outcome: Progressing  Goal: Be free from injury by end of the shift  Outcome: Progressing  Goal: Verbalize understanding of personal risk factors for fall in the hospital  Outcome: Progressing  Goal: Verbalize understanding of risk factor reduction measures to prevent injury from fall in the home  Outcome: Progressing  Goal: Use assistive devices by end of the shift  Outcome: Progressing  Goal: Pace activities to prevent fatigue by end of the shift  Outcome: Progressing     Problem: Pain  Goal: Takes deep breaths with improved pain control throughout the shift  Outcome: Progressing  Goal: Turns in bed with improved pain control throughout the shift  Outcome: Progressing  Goal: Walks with improved pain control throughout the shift  Outcome: Progressing  Goal: Performs ADL's with improved pain control throughout shift  Outcome: Progressing  Goal: Participates in PT with improved pain control throughout the shift  Outcome: Progressing  Goal: Free from opioid side effects throughout the shift  Outcome: Progressing  Goal: Free from acute confusion related to pain meds throughout the shift  Outcome: Progressing     Problem: Pain - Adult  Goal: Verbalizes/displays adequate comfort level or baseline comfort level  Outcome: Progressing     Problem: Safety - Adult  Goal: Free from fall injury  Outcome: Progressing     Problem: Discharge Planning  Goal: Discharge to home or other facility with appropriate resources  Outcome: Progressing     Problem: Chronic Conditions and Co-morbidities  Goal: Patient's chronic conditions and co-morbidity symptoms are monitored and maintained or improved  Outcome: Progressing     Problem: Skin  Goal: Decreased wound size/increased tissue granulation at next dressing change  Outcome: Progressing  Goal: Participates in plan/prevention/treatment measures  Outcome: Progressing  Goal: Prevent/manage excess moisture  Outcome: Progressing  Goal:  Prevent/minimize sheer/friction injuries  Outcome: Progressing  Goal: Promote/optimize nutrition  Outcome: Progressing  Goal: Promote skin healing  Outcome: Progressing   The patient's goals for the shift include get sleep    The clinical goals for the shift include patient will remain free from falls this shift

## 2024-07-27 PROCEDURE — 2500000001 HC RX 250 WO HCPCS SELF ADMINISTERED DRUGS (ALT 637 FOR MEDICARE OP)

## 2024-07-27 PROCEDURE — 2500000002 HC RX 250 W HCPCS SELF ADMINISTERED DRUGS (ALT 637 FOR MEDICARE OP, ALT 636 FOR OP/ED)

## 2024-07-27 PROCEDURE — 2500000004 HC RX 250 GENERAL PHARMACY W/ HCPCS (ALT 636 FOR OP/ED): Performed by: ANESTHESIOLOGY

## 2024-07-27 PROCEDURE — 94660 CPAP INITIATION&MGMT: CPT

## 2024-07-27 PROCEDURE — 94640 AIRWAY INHALATION TREATMENT: CPT

## 2024-07-27 PROCEDURE — 2500000001 HC RX 250 WO HCPCS SELF ADMINISTERED DRUGS (ALT 637 FOR MEDICARE OP): Performed by: ANESTHESIOLOGY

## 2024-07-27 PROCEDURE — 1100000001 HC PRIVATE ROOM DAILY

## 2024-07-27 PROCEDURE — 94668 MNPJ CHEST WALL SBSQ: CPT

## 2024-07-27 RX ORDER — IPRATROPIUM BROMIDE AND ALBUTEROL SULFATE 2.5; .5 MG/3ML; MG/3ML
3 SOLUTION RESPIRATORY (INHALATION)
Status: DISCONTINUED | OUTPATIENT
Start: 2024-07-27 | End: 2024-07-29 | Stop reason: HOSPADM

## 2024-07-27 ASSESSMENT — PAIN SCALES - GENERAL
PAINLEVEL_OUTOF10: 4
PAINLEVEL_OUTOF10: 7

## 2024-07-27 ASSESSMENT — COGNITIVE AND FUNCTIONAL STATUS - GENERAL
MOVING TO AND FROM BED TO CHAIR: A LITTLE
WALKING IN HOSPITAL ROOM: A LOT
TOILETING: A LITTLE
HELP NEEDED FOR BATHING: A LITTLE
TURNING FROM BACK TO SIDE WHILE IN FLAT BAD: A LITTLE
MOBILITY SCORE: 15
DAILY ACTIVITIY SCORE: 21
DRESSING REGULAR LOWER BODY CLOTHING: A LITTLE
MOVING FROM LYING ON BACK TO SITTING ON SIDE OF FLAT BED WITH BEDRAILS: A LITTLE
STANDING UP FROM CHAIR USING ARMS: A LITTLE
CLIMB 3 TO 5 STEPS WITH RAILING: TOTAL

## 2024-07-27 NOTE — CARE PLAN
The patient's goals for the shift include get sleep    The clinical goals for the shift include Patient will remain comfortable throughout shift.      Problem: Fall/Injury  Goal: Not fall by end of shift  Outcome: Progressing     Problem: Fall/Injury  Goal: Not fall by end of shift  Outcome: Progressing  Goal: Be free from injury by end of the shift  Outcome: Progressing  Goal: Verbalize understanding of personal risk factors for fall in the hospital  Outcome: Progressing  Goal: Verbalize understanding of risk factor reduction measures to prevent injury from fall in the home  Outcome: Progressing  Goal: Use assistive devices by end of the shift  Outcome: Progressing  Goal: Pace activities to prevent fatigue by end of the shift  Outcome: Progressing     Problem: Pain  Goal: Takes deep breaths with improved pain control throughout the shift  Outcome: Progressing  Goal: Turns in bed with improved pain control throughout the shift  Outcome: Progressing  Goal: Walks with improved pain control throughout the shift  Outcome: Progressing  Goal: Performs ADL's with improved pain control throughout shift  Outcome: Progressing  Goal: Participates in PT with improved pain control throughout the shift  Outcome: Progressing  Goal: Free from opioid side effects throughout the shift  Outcome: Progressing  Goal: Free from acute confusion related to pain meds throughout the shift  Outcome: Progressing     Problem: Pain - Adult  Goal: Verbalizes/displays adequate comfort level or baseline comfort level  Outcome: Progressing

## 2024-07-27 NOTE — PROGRESS NOTES
"Orthopaedic Surgery Progress Note    Subjective: NAEO. Doing well this AM. Doing breathing treatment without concerns. SW spoke with family, planning for safe DC to SNF once approved. Patient and family in agreement.     Objective:  /74 (BP Location: Left arm, Patient Position: Lying)   Pulse 75   Temp 36.7 °C (98.1 °F) (Temporal)   Resp 16   Ht 1.448 m (4' 9.01\")   Wt 65.8 kg (145 lb)   SpO2 94%   BMI 31.37 kg/m²     Gen: arousable, NAD, appropriately conversational  Cardiac: RRR to peripheral palpation  Resp: nonlabored on RA  GI: soft, nondistended    MSK:  - ACE dressing c/d/i  - Compartments soft and compressible  - Fires TA/GS/EHL  - SILT in Miller/Sa/SP/DP/T distribution  - Palpable DP pulse    Assessment/Plan: 73 y.o. female s/p removal of ex-fix, L hindfoot fusion nail on 7/22/24 with Dr. Vasquez.      Plan:  - Weight bearing: NWB LLE  - DVT ppx: SCDs, ASA 81 mg BID  - Diet: Regular  - Pain: Tylenol, oxycodone 5/10  - Antibiotics: perioperative ancef 2g q8hr x3 doses  - Dressing: ACE/kerlix  - FEN: HLIV with good PO intake  - Bowel Regimen: Colace, senna, dulcolax  - PT/OT  - Pulm: Encourage IS, appreciate pulmonology continuing to follow to optimize respiratory status  - Continue home medications  - No mccabe    Dispo: pending PT/OT, anticipate SNF    This patient will be followed by the Orthopaedic Trauma service. Please page or Epic Chat the corresponding residents below with questions or concerns.     Ortho Trauma Service (Epic Chat Preferred)  First call: Félix Lazo MD PGY-1  First call: Fortunato Goss MD PGY-1  Second call: Renzo Samuels PGY-2  Third call: Donte Zaragoza PGY-3    6pm-6am M-F, Holidays, and weekends page Ortho on-call @[phone] with urgent questions/concerns.     Fortunato Goss MD  Orthopaedic Surgery PGY-1  On-call pager [phone]  "

## 2024-07-28 VITALS
OXYGEN SATURATION: 93 % | HEART RATE: 75 BPM | TEMPERATURE: 96.8 F | SYSTOLIC BLOOD PRESSURE: 112 MMHG | BODY MASS INDEX: 31.28 KG/M2 | WEIGHT: 145 LBS | DIASTOLIC BLOOD PRESSURE: 72 MMHG | RESPIRATION RATE: 15 BRPM | HEIGHT: 57 IN

## 2024-07-28 PROCEDURE — 2500000004 HC RX 250 GENERAL PHARMACY W/ HCPCS (ALT 636 FOR OP/ED)

## 2024-07-28 PROCEDURE — 2500000001 HC RX 250 WO HCPCS SELF ADMINISTERED DRUGS (ALT 637 FOR MEDICARE OP): Performed by: ANESTHESIOLOGY

## 2024-07-28 PROCEDURE — 94668 MNPJ CHEST WALL SBSQ: CPT

## 2024-07-28 PROCEDURE — 2500000001 HC RX 250 WO HCPCS SELF ADMINISTERED DRUGS (ALT 637 FOR MEDICARE OP)

## 2024-07-28 PROCEDURE — 94640 AIRWAY INHALATION TREATMENT: CPT

## 2024-07-28 PROCEDURE — 1100000001 HC PRIVATE ROOM DAILY

## 2024-07-28 PROCEDURE — 2500000002 HC RX 250 W HCPCS SELF ADMINISTERED DRUGS (ALT 637 FOR MEDICARE OP, ALT 636 FOR OP/ED)

## 2024-07-28 PROCEDURE — 2500000004 HC RX 250 GENERAL PHARMACY W/ HCPCS (ALT 636 FOR OP/ED): Performed by: ANESTHESIOLOGY

## 2024-07-28 RX ORDER — OXYCODONE HYDROCHLORIDE 5 MG/1
5-10 TABLET ORAL EVERY 6 HOURS PRN
Qty: 40 TABLET | Refills: 0 | Status: SHIPPED | OUTPATIENT
Start: 2024-07-28 | End: 2024-08-04

## 2024-07-28 RX ORDER — DOCUSATE SODIUM 100 MG/1
100 CAPSULE, LIQUID FILLED ORAL 2 TIMES DAILY
Qty: 30 CAPSULE | Refills: 0 | Status: SHIPPED | OUTPATIENT
Start: 2024-07-28 | End: 2024-08-12

## 2024-07-28 RX ORDER — ONDANSETRON 4 MG/1
4 TABLET, FILM COATED ORAL EVERY 8 HOURS PRN
Qty: 20 TABLET | Refills: 0 | Status: SHIPPED | OUTPATIENT
Start: 2024-07-28

## 2024-07-28 RX ORDER — ASPIRIN 81 MG/1
81 TABLET ORAL 2 TIMES DAILY
Qty: 60 TABLET | Refills: 0 | Status: SHIPPED | OUTPATIENT
Start: 2024-07-28 | End: 2024-08-27

## 2024-07-28 RX ORDER — ACETAMINOPHEN 500 MG
1000 TABLET ORAL EVERY 8 HOURS
Qty: 60 TABLET | Refills: 1 | Status: SHIPPED | OUTPATIENT
Start: 2024-07-28 | End: 2024-08-17

## 2024-07-28 RX ORDER — FERROUS SULFATE, DRIED 160(50) MG
1 TABLET, EXTENDED RELEASE ORAL 2 TIMES DAILY
Qty: 60 TABLET | Refills: 2 | Status: SHIPPED | OUTPATIENT
Start: 2024-07-28 | End: 2024-10-26

## 2024-07-28 ASSESSMENT — PAIN DESCRIPTION - ORIENTATION
ORIENTATION: LEFT
ORIENTATION: LEFT

## 2024-07-28 ASSESSMENT — COGNITIVE AND FUNCTIONAL STATUS - GENERAL
TURNING FROM BACK TO SIDE WHILE IN FLAT BAD: A LITTLE
MOBILITY SCORE: 15
DRESSING REGULAR LOWER BODY CLOTHING: A LITTLE
STANDING UP FROM CHAIR USING ARMS: A LITTLE
DAILY ACTIVITIY SCORE: 21
MOVING TO AND FROM BED TO CHAIR: A LITTLE
MOVING FROM LYING ON BACK TO SITTING ON SIDE OF FLAT BED WITH BEDRAILS: A LITTLE
HELP NEEDED FOR BATHING: A LITTLE
TOILETING: A LITTLE
CLIMB 3 TO 5 STEPS WITH RAILING: TOTAL
TURNING FROM BACK TO SIDE WHILE IN FLAT BAD: A LITTLE
STANDING UP FROM CHAIR USING ARMS: A LITTLE
HELP NEEDED FOR BATHING: A LITTLE
DRESSING REGULAR LOWER BODY CLOTHING: A LITTLE
MOBILITY SCORE: 15
MOVING FROM LYING ON BACK TO SITTING ON SIDE OF FLAT BED WITH BEDRAILS: A LITTLE
MOVING TO AND FROM BED TO CHAIR: A LITTLE
CLIMB 3 TO 5 STEPS WITH RAILING: TOTAL
TOILETING: A LITTLE
DAILY ACTIVITIY SCORE: 21
WALKING IN HOSPITAL ROOM: A LOT
WALKING IN HOSPITAL ROOM: A LOT

## 2024-07-28 ASSESSMENT — PAIN SCALES - GENERAL
PAINLEVEL_OUTOF10: 5 - MODERATE PAIN
PAINLEVEL_OUTOF10: 7
PAINLEVEL_OUTOF10: 5 - MODERATE PAIN
PAINLEVEL_OUTOF10: 5 - MODERATE PAIN
PAINLEVEL_OUTOF10: 8

## 2024-07-28 ASSESSMENT — PAIN - FUNCTIONAL ASSESSMENT
PAIN_FUNCTIONAL_ASSESSMENT: 0-10

## 2024-07-28 ASSESSMENT — PAIN DESCRIPTION - LOCATION
LOCATION: ANKLE
LOCATION: ANKLE

## 2024-07-28 NOTE — PROGRESS NOTES
Discharge Planning Note:      Renee Lombardo is a 73 y.o. female on day 13 of admission presenting with Closed left ankle fracture, initial encounter.     Call placed to the patient for FOC with SNFs since all 4 referrals are able to accept. Patient asked to reach out to her daughter Zulay Roy  [phone], since she's handling it. Call placed no response left a message for a return call.                 Neeru Lora RN

## 2024-07-28 NOTE — CARE PLAN
The patient's goals for the shift include get sleep    The clinical goals for the shift include Patient will remain comfortable throughout shift.      Problem: Fall/Injury  Goal: Not fall by end of shift  Outcome: Progressing     Problem: Fall/Injury  Goal: Be free from injury by end of the shift  Outcome: Progressing     Problem: Fall/Injury  Goal: Verbalize understanding of personal risk factors for fall in the hospital  Outcome: Progressing     Problem: Fall/Injury  Goal: Verbalize understanding of risk factor reduction measures to prevent injury from fall in the home  Outcome: Progressing     Problem: Fall/Injury  Goal: Use assistive devices by end of the shift  Outcome: Progressing     Problem: Pain  Goal: Free from opioid side effects throughout the shift  Outcome: Progressing     Problem: Pain - Adult  Goal: Verbalizes/displays adequate comfort level or baseline comfort level  Outcome: Progressing     Problem: Safety - Adult  Goal: Free from fall injury  Outcome: Progressing     Problem: Chronic Conditions and Co-morbidities  Goal: Patient's chronic conditions and co-morbidity symptoms are monitored and maintained or improved  Outcome: Progressing     Problem: Skin  Goal: Prevent/manage excess moisture  Outcome: Progressing     Problem: Skin  Goal: Prevent/minimize sheer/friction injuries  Outcome: Progressing     Problem: Skin  Goal: Promote/optimize nutrition  Outcome: Progressing

## 2024-07-28 NOTE — CARE PLAN
Problem: Fall/Injury  Goal: Not fall by end of shift  Outcome: Progressing  Goal: Be free from injury by end of the shift  Outcome: Progressing  Goal: Verbalize understanding of personal risk factors for fall in the hospital  Outcome: Progressing  Goal: Verbalize understanding of risk factor reduction measures to prevent injury from fall in the home  Outcome: Progressing  Goal: Use assistive devices by end of the shift  Outcome: Progressing  Goal: Pace activities to prevent fatigue by end of the shift  Outcome: Progressing     Problem: Pain  Goal: Takes deep breaths with improved pain control throughout the shift  Outcome: Progressing  Goal: Turns in bed with improved pain control throughout the shift  Outcome: Progressing  Goal: Walks with improved pain control throughout the shift  Outcome: Progressing  Goal: Performs ADL's with improved pain control throughout shift  Outcome: Progressing  Goal: Participates in PT with improved pain control throughout the shift  Outcome: Progressing  Goal: Free from opioid side effects throughout the shift  Outcome: Progressing  Goal: Free from acute confusion related to pain meds throughout the shift  Outcome: Progressing     Problem: Pain - Adult  Goal: Verbalizes/displays adequate comfort level or baseline comfort level  Outcome: Progressing     Problem: Safety - Adult  Goal: Free from fall injury  Outcome: Progressing     Problem: Discharge Planning  Goal: Discharge to home or other facility with appropriate resources  Outcome: Progressing     Problem: Chronic Conditions and Co-morbidities  Goal: Patient's chronic conditions and co-morbidity symptoms are monitored and maintained or improved  Outcome: Progressing     Problem: Skin  Goal: Decreased wound size/increased tissue granulation at next dressing change  Outcome: Progressing  Goal: Participates in plan/prevention/treatment measures  Outcome: Progressing  Goal: Prevent/manage excess moisture  Outcome: Progressing  Goal:  Prevent/minimize sheer/friction injuries  Outcome: Progressing  Goal: Promote/optimize nutrition  Outcome: Progressing  Goal: Promote skin healing  Outcome: Progressing   The patient's goals for the shift include get sleep    The clinical goals for the shift include patient will be free from falls this shift

## 2024-07-28 NOTE — PROGRESS NOTES
"Orthopaedic Surgery Progress Note    Subjective: NAEO. Doing well this AM. More comfortable following PM breathing treatment.     Objective:  /72   Pulse 86   Temp 36.6 °C (97.9 °F) (Temporal)   Resp 16   Ht 1.448 m (4' 9.01\")   Wt 65.8 kg (145 lb)   SpO2 96%   BMI 31.37 kg/m²     Gen: arousable, NAD, appropriately conversational  Cardiac: RRR to peripheral palpation  Resp: nonlabored on RA  GI: soft, nondistended    MSK:  - ACE dressing c/d/i  - Compartments soft and compressible  - Fires TA/GS/EHL  - SILT in Miller/Sa/SP/DP/T distribution  - Palpable DP pulse    Assessment/Plan: 73 y.o. female s/p removal of ex-fix, L hindfoot fusion nail on 7/22/24 with Dr. Vasquez.      Plan:  - Weight bearing: NWB LLE  - DVT ppx: SCDs, ASA 81 mg BID  - Diet: Regular  - Pain: Tylenol, oxycodone 5/10  - Antibiotics: perioperative ancef 2g q8hr x3 doses  - Dressing: ACE/kerlix  - FEN: HLIV with good PO intake  - Bowel Regimen: Colace, senna, dulcolax  - PT/OT  - Pulm: Encourage IS, appreciate pulmonology continuing to follow to optimize respiratory status  - Continue home medications  - No mccabe    Dispo: pending SNF placement    This patient will be followed by the Orthopaedic Trauma service. Please page or Epic Chat the corresponding residents below with questions or concerns.     Ortho Trauma Service (Epic Chat Preferred)  First call: Félix Lazo MD PGY-1  First call: Fortunato Goss MD PGY-1  Second call: Renzo Samuels PGY-2  Third call: Donte Zaragoza PGY-3    6pm-6am M-F, Holidays, and weekends page Ortho on-call @[phone] with urgent questions/concerns.     Fortunato Goss MD  Orthopaedic Surgery PGY-1  On-call pager [phone]  " Insurance does not typically cover this.  Not sure what kind of an order she is looking for.  I usually send people to a web site called [email or web address] for compression stocking needs.  She can also get compression stocking at [employer] or [employer].    This pharmacy no longer carries them.      Will have staff notify patient.    Gui Marques MD

## 2024-07-29 VITALS
BODY MASS INDEX: 31.28 KG/M2 | DIASTOLIC BLOOD PRESSURE: 78 MMHG | OXYGEN SATURATION: 95 % | RESPIRATION RATE: 16 BRPM | TEMPERATURE: 97.2 F | SYSTOLIC BLOOD PRESSURE: 114 MMHG | HEART RATE: 76 BPM | HEIGHT: 57 IN | WEIGHT: 145 LBS

## 2024-07-29 PROCEDURE — 94660 CPAP INITIATION&MGMT: CPT

## 2024-07-29 PROCEDURE — 94668 MNPJ CHEST WALL SBSQ: CPT

## 2024-07-29 PROCEDURE — 94640 AIRWAY INHALATION TREATMENT: CPT

## 2024-07-29 PROCEDURE — 2500000001 HC RX 250 WO HCPCS SELF ADMINISTERED DRUGS (ALT 637 FOR MEDICARE OP)

## 2024-07-29 PROCEDURE — 2500000002 HC RX 250 W HCPCS SELF ADMINISTERED DRUGS (ALT 637 FOR MEDICARE OP, ALT 636 FOR OP/ED)

## 2024-07-29 PROCEDURE — 2500000004 HC RX 250 GENERAL PHARMACY W/ HCPCS (ALT 636 FOR OP/ED): Performed by: ANESTHESIOLOGY

## 2024-07-29 NOTE — PROGRESS NOTES
Social Work Note:    TACOS spoke with the patient's daughter who reported that she believes the patient would like to go to [facility] [place].  Patient is medicare and doesn't need a precert.  TROYW completed 7000 and sent it to facility.  Patient is leaving at 3pm via stretcher with 24/7 transport.  TROYW let patient know.  TACOS will continue to follow     VIRGINIA Bradford, TACOS-S

## 2024-07-29 NOTE — PROGRESS NOTES
"Orthopaedic Surgery Progress Note    Subjective: NAEO. Doing well this AM. More comfortable after breathing treatments. Has CPAP machine for night.      Objective:  /72 (BP Location: Left arm, Patient Position: Lying)   Pulse 75   Temp 36 °C (96.8 °F) (Temporal)   Resp 15   Ht 1.448 m (4' 9.01\")   Wt 65.8 kg (145 lb)   SpO2 93%   BMI 31.37 kg/m²     Gen: arousable, NAD, appropriately conversational  Cardiac: RRR to peripheral palpation  Resp: nonlabored on RA  GI: soft, nondistended    MSK:  - ACE dressing c/d/i  - Compartments soft and compressible  - Fires TA/GS/EHL  - SILT in Miller/Sa/SP/DP/T distribution  - Palpable DP pulse    Assessment/Plan: 73 y.o. female s/p removal of ex-fix, L hindfoot fusion nail on 7/22/24 with Dr. Vasquez.      Plan:  - Weight bearing: NWB LLE  - DVT ppx: SCDs, ASA 81 mg BID  - Diet: Regular  - Pain: Tylenol, oxycodone 5/10  - Antibiotics: perioperative ancef 2g q8hr x3 doses  - Dressing: ACE/kerlix  - FEN: HLIV with good PO intake  - Bowel Regimen: Colace, senna, dulcolax  - PT/OT  - Pulm: Encourage IS, appreciate pulmonology continuing to follow to optimize respiratory status  - Continue home medications  - No mccabe    Dispo: pending SNF placement    This patient will be followed by the Orthopaedic Trauma service. Please page or Epic Chat the corresponding residents below with questions or concerns.     Ortho Trauma Service (Epic Chat Preferred)  First call: Félix Lazo MD PGY-1  First call: Fortunato Goss MD PGY-1  Second call: Renzo Samuels, PGY-2  Third call: Donte Zaragoza PGY-3    6pm-6am M-F, Holidays, and weekends page Ortho on-call @[phone] with urgent questions/concerns.     Renzo Samuels MD  Orthopaedic Surgery PGY-2  On-call pager [phone]  "

## 2024-07-29 NOTE — DISCHARGE SUMMARY
Discharge Diagnosis  Closed left ankle fracture, initial encounter    Issues Requiring Follow-Up  Postoperative evaluation    Test Results Pending At Discharge  Pending Labs       No current pending labs.            Hospital Course  73 year-old female who presented with left trimalleolar ankle fracture. Patient is now s/p external fixation on 7/16 by Dr. Mann. Patient then returned to OR for removal of external fixation, L hindfoot fusion nail on 7/22 by Dr. Vasquez. On the day of surgery, patient was identified in the pre-operative holding area and agreeable to proceed with surgery. Written consent was obtained.  Please see operative note for further details of this procedure. Patient received 24 hours of matt-operative antibiotics. Patient recovered in the PACU before transfer to a regular nursing floor. Patient was started on multimodal pain control and lovenox for DVT prophylaxis. Physical therapy recommended continued recovery at skilled nursing facility with continued physical therapy and wound care. On the day of discharge, patient was afebrile with stable vital signs. Patient was neurovascularly intact at time of discharge. Patient was discharged with prescription of ASA 81 mg bid for DVT prophylaxis for 4 weeks. Patient will follow-up with Dr. Vasquez in 3 weeks for post-operative visit.      Pertinent Physical Exam At Time of Discharge  Gen: arousable, NAD, appropriately conversational  Cardiac: RRR to peripheral palpation  Resp: nonlabored on RA  GI: soft, nondistended     MSK:  - ACE dressing c/d/i  - Compartments soft and compressible  - Fires TA/GS/EHL  - SILT in Miller/Sa/SP/DP/T distribution  - Palpable DP pulse     Home Medications     Medication List      START taking these medications     aspirin 81 mg EC tablet; Take 1 tablet (81 mg) by mouth 2 times a day.   calcium carbonate-vitamin D3 500 mg-5 mcg (200 unit) tablet; Take 1   tablet by mouth 2 times a day.   ondansetron 4 mg tablet; Commonly known  as: Zofran; Take 1 tablet (4 mg)   by mouth every 8 hours if needed for nausea or vomiting.   oxyCODONE 5 mg immediate release tablet; Commonly known as: Roxicodone;   Take 1-2 tablets (5-10 mg) by mouth every 6 hours if needed for severe   pain (7 - 10) for up to 7 days.     CHANGE how you take these medications     acetaminophen 500 mg tablet; Commonly known as: Tylenol; Take 2 tablets   (1,000 mg) by mouth every 8 hours for 20 days.; What changed: when to take   this, reasons to take this   * docusate sodium 100 mg capsule; Commonly known as: Colace; What   changed: Another medication with the same name was added. Make sure you   understand how and when to take each.   * docusate sodium 100 mg capsule; Commonly known as: Colace; Take 1   capsule (100 mg) by mouth 2 times a day for 15 days.; What changed: You   were already taking a medication with the same name, and this prescription   was added. Make sure you understand how and when to take each.  * This list has 2 medication(s) that are the same as other medications   prescribed for you. Read the directions carefully, and ask your doctor or   other care provider to review them with you.     CONTINUE taking these medications     albuterol 90 mcg/actuation inhaler   alum-mag hydroxide-simeth 400-400-40 mg/5 mL suspension; Commonly known   as: Maalox MAX   furosemide 40 mg tablet; Commonly known as: Lasix   ipratropium-albuteroL 0.5-2.5 mg/3 mL nebulizer solution; Commonly known   as: Duo-Neb   levothyroxine 100 mcg tablet; Commonly known as: Synthroid, Levoxyl   Ocean Nasal 0.65 % nasal spray; Generic drug: sodium chloride   potassium chloride 20 mEq packet; Commonly known as: Klor-Con   sucralfate 1 gram tablet; Commonly known as: Carafate   Trelegy Ellipta 200-62.5-25 mcg blister with device; Generic drug:   fluticasone-umeclidin-vilanter       Outpatient Follow-Up  Future Appointments   Date Time Provider Department Center   8/29/2024  9:45 AM Channing Vasquez  MD [id number]       Félix Lazo MD

## 2024-07-29 NOTE — CARE PLAN
The patient's goals for the shift include get sleep    The clinical goals for the shift include Patient will remain comfortable throughout shift.      Problem: Fall/Injury  Goal: Not fall by end of shift  Outcome: Progressing  Goal: Be free from injury by end of the shift  Outcome: Progressing  Goal: Verbalize understanding of personal risk factors for fall in the hospital  Outcome: Progressing  Goal: Verbalize understanding of risk factor reduction measures to prevent injury from fall in the home  Outcome: Progressing  Goal: Use assistive devices by end of the shift  Outcome: Progressing  Goal: Pace activities to prevent fatigue by end of the shift  Outcome: Progressing     Problem: Pain  Goal: Takes deep breaths with improved pain control throughout the shift  Outcome: Progressing  Goal: Turns in bed with improved pain control throughout the shift  Outcome: Progressing  Goal: Walks with improved pain control throughout the shift  Outcome: Progressing  Goal: Performs ADL's with improved pain control throughout shift  Outcome: Progressing  Goal: Participates in PT with improved pain control throughout the shift  Outcome: Progressing  Goal: Free from opioid side effects throughout the shift  Outcome: Progressing  Goal: Free from acute confusion related to pain meds throughout the shift  Outcome: Progressing

## 2024-07-29 NOTE — CARE PLAN
Problem: Fall/Injury  Goal: Not fall by end of shift  Outcome: Progressing  Goal: Be free from injury by end of the shift  Outcome: Progressing  Goal: Verbalize understanding of personal risk factors for fall in the hospital  Outcome: Progressing  Goal: Verbalize understanding of risk factor reduction measures to prevent injury from fall in the home  Outcome: Progressing  Goal: Use assistive devices by end of the shift  Outcome: Progressing  Goal: Pace activities to prevent fatigue by end of the shift  Outcome: Progressing     Problem: Pain  Goal: Takes deep breaths with improved pain control throughout the shift  Outcome: Progressing  Goal: Turns in bed with improved pain control throughout the shift  Outcome: Progressing  Goal: Walks with improved pain control throughout the shift  Outcome: Progressing  Goal: Performs ADL's with improved pain control throughout shift  Outcome: Progressing  Goal: Participates in PT with improved pain control throughout the shift  Outcome: Progressing  Goal: Free from opioid side effects throughout the shift  Outcome: Progressing  Goal: Free from acute confusion related to pain meds throughout the shift  Outcome: Progressing     Problem: Pain - Adult  Goal: Verbalizes/displays adequate comfort level or baseline comfort level  Outcome: Progressing     Problem: Safety - Adult  Goal: Free from fall injury  Outcome: Progressing     Problem: Discharge Planning  Goal: Discharge to home or other facility with appropriate resources  Outcome: Progressing     Problem: Chronic Conditions and Co-morbidities  Goal: Patient's chronic conditions and co-morbidity symptoms are monitored and maintained or improved  Outcome: Progressing     Problem: Skin  Goal: Decreased wound size/increased tissue granulation at next dressing change  Outcome: Progressing  Goal: Participates in plan/prevention/treatment measures  Outcome: Progressing  Goal: Prevent/manage excess moisture  Outcome: Progressing  Goal:  Prevent/minimize sheer/friction injuries  Outcome: Progressing  Goal: Promote/optimize nutrition  Outcome: Progressing  Goal: Promote skin healing  Outcome: Progressing   The patient's goals for the shift include get sleep    The clinical goals for the shift include Patient will remain comfortable throughout shift.

## 2024-07-30 ENCOUNTER — NURSING HOME VISIT (OUTPATIENT)
Dept: POST ACUTE CARE | Facility: EXTERNAL LOCATION | Age: 74
End: 2024-07-30
Payer: MEDICARE

## 2024-07-30 DIAGNOSIS — K21.9 GASTROESOPHAGEAL REFLUX DISEASE WITHOUT ESOPHAGITIS: ICD-10-CM

## 2024-07-30 DIAGNOSIS — I50.9 CONGESTIVE HEART FAILURE, UNSPECIFIED HF CHRONICITY, UNSPECIFIED HEART FAILURE TYPE (MULTI): ICD-10-CM

## 2024-07-30 DIAGNOSIS — D64.9 ANEMIA, UNSPECIFIED TYPE: ICD-10-CM

## 2024-07-30 DIAGNOSIS — I10 PRIMARY HYPERTENSION: Primary | ICD-10-CM

## 2024-07-30 PROCEDURE — 99309 SBSQ NF CARE MODERATE MDM 30: CPT | Performed by: REGISTERED NURSE

## 2024-07-30 NOTE — LETTER
Patient: Renee Lombardo  : 1950    Encounter Date: 2024    PROGRESS NOTE    Subjective  Chief complaint: Renee Lombardo is a 73 y.o. female who is an acute skilled patient being seen and evaluated for weakness    HPI:  24 Patient admitted to SNF for therapy d/t weakness after recent hospitalization.   Patient requires assist with ADLs and transfers.  No new complaints.      Objective  Vital signs: 125/86, 97.8, 87, 18, 94%    Physical Exam  Constitutional:       General: She is not in acute distress.  Eyes:      Extraocular Movements: Extraocular movements intact.   Pulmonary:      Effort: Pulmonary effort is normal.   Musculoskeletal:      Cervical back: Neck supple.   Neurological:      Mental Status: She is alert.   Psychiatric:         Mood and Affect: Mood normal.         Behavior: Behavior is cooperative.         Assessment/Plan  Problem List Items Addressed This Visit       CHF (congestive heart failure) (Multi)      no SOB, Rales, edema   control BP   EMILIANO diet   monitor weights   Lasix            Gastroesophageal reflux disease      no complaints   reinforced sitting up after eating   Carafate            HTN (hypertension) - Primary      BP within goal   Continue Lasix T, TH, SA, PRICE   routine BMP   monitor BP            Anemia     Monitor hgb           Medications, treatments, and labs reviewed  Continue medications and treatments as listed in PCC    Scribe Attestation  IClare Scribe   attest that this documentation has been prepared under the direction and in the presence of CARLOS ALBERTO Hughes.    Provider Attestation - Scribe documentation  All medical record entries made by the Scribe were at my direction and personally dictated by me. I have reviewed the chart and agree that the record accurately reflects my personal performance of the history, physical exam, discussion and plan.    CARLOS ALBERTO Hughes        Electronically Signed By: Marco A  JULIÁN Hdez, APRN-CNP   8/5/24  8:22 PM   Hatchet Flap Text: The defect edges were debeveled with a #15 scalpel blade.  Given the location of the defect, shape of the defect and the proximity to free margins a hatchet flap was deemed most appropriate.  Using a sterile surgical marker, an appropriate hatchet flap was drawn incorporating the defect and placing the expected incisions within the inferior melolabial crease, with the SCD medially and inferiorly marked.    The area thus outlined was incised deep to adipose tissue with a #15 scalpel blade.  The skin margins were undermined to an appropriate distance in all directions utilizing iris scissors superficial to the orbicularis oris muscle.

## 2024-07-31 ENCOUNTER — NURSING HOME VISIT (OUTPATIENT)
Dept: POST ACUTE CARE | Facility: EXTERNAL LOCATION | Age: 74
End: 2024-07-31
Payer: MEDICARE

## 2024-07-31 ENCOUNTER — DOCUMENTATION (OUTPATIENT)
Dept: ORTHOPEDIC SURGERY | Facility: CLINIC | Age: 74
End: 2024-07-31
Payer: MEDICARE

## 2024-07-31 DIAGNOSIS — J44.9 CHRONIC OBSTRUCTIVE PULMONARY DISEASE, UNSPECIFIED COPD TYPE (MULTI): Primary | ICD-10-CM

## 2024-07-31 DIAGNOSIS — I50.9 CONGESTIVE HEART FAILURE, UNSPECIFIED HF CHRONICITY, UNSPECIFIED HEART FAILURE TYPE (MULTI): ICD-10-CM

## 2024-07-31 DIAGNOSIS — D64.9 ANEMIA, UNSPECIFIED TYPE: ICD-10-CM

## 2024-07-31 DIAGNOSIS — I10 PRIMARY HYPERTENSION: ICD-10-CM

## 2024-07-31 PROCEDURE — 99309 SBSQ NF CARE MODERATE MDM 30: CPT | Performed by: REGISTERED NURSE

## 2024-07-31 NOTE — LETTER
Patient: Renee Lombardo  : 1950    Encounter Date: 2024    PROGRESS NOTE    Subjective  Chief complaint: Renee Lombardo is a 73 y.o. female patient being seen and evaluated for monthly general medical care and follow-up    HPI:  Patient presents for general medical care and f/u.  Patient seen and examined at bedside.  No issues per nursing.  Patient has no acute complaints.        Objective  Vital signs: 116/71, 98.2, 83, 18, 95%    Physical Exam  Constitutional:       General: She is not in acute distress.  Eyes:      Extraocular Movements: Extraocular movements intact.   Pulmonary:      Effort: Pulmonary effort is normal.   Musculoskeletal:      Cervical back: Neck supple.   Neurological:      Mental Status: She is alert.   Psychiatric:         Mood and Affect: Mood normal.         Behavior: Behavior is cooperative.         Assessment/Plan  Problem List Items Addressed This Visit       Chronic obstructive pulmonary disease (Multi) - Primary      Albuterol - budesonide, Trelegy   CXR, Mucinex, DuoNebs         CHF (congestive heart failure) (Multi)      no SOB, Rales, edema   control BP   EMILIANO diet   monitor weights   Lasix            HTN (hypertension)      BP within goal   Continue Lasix T, TH, SA, PRICE   routine BMP   monitor BP            Anemia     Monitor hgb           Medications, treatments, and labs reviewed  Continue medications and treatments as listed in EMR    Scribe Attestation  IClare Scribe   attest that this documentation has been prepared under the direction and in the presence of CARLOS ALBERTO Hughes.    Provider Attestation - Scribe documentation  All medical record entries made by the Scribe were at my direction and personally dictated by me. I have reviewed the chart and agree that the record accurately reflects my personal performance of the history, physical exam, discussion and plan.    CARLOS ALBERTO Hughes      Electronically Signed By:  Marco A Hdez, APRN-CNP   8/7/24  2:52 PM

## 2024-07-31 NOTE — PROGRESS NOTES
Pt is s/p left ankle hind foot fusion nail on 7/22/24. She is to be NO WEIGHT BEARING until her follow up appointment with Dr. Vasquez on 8/29/24.    Ayaka Alexander PA-C

## 2024-08-01 ENCOUNTER — NURSING HOME VISIT (OUTPATIENT)
Dept: POST ACUTE CARE | Facility: EXTERNAL LOCATION | Age: 74
End: 2024-08-01
Payer: MEDICARE

## 2024-08-01 DIAGNOSIS — J44.9 CHRONIC OBSTRUCTIVE PULMONARY DISEASE, UNSPECIFIED COPD TYPE (MULTI): ICD-10-CM

## 2024-08-01 DIAGNOSIS — I10 PRIMARY HYPERTENSION: Primary | ICD-10-CM

## 2024-08-01 DIAGNOSIS — I50.9 CONGESTIVE HEART FAILURE, UNSPECIFIED HF CHRONICITY, UNSPECIFIED HEART FAILURE TYPE (MULTI): ICD-10-CM

## 2024-08-01 DIAGNOSIS — K21.9 GASTROESOPHAGEAL REFLUX DISEASE WITHOUT ESOPHAGITIS: ICD-10-CM

## 2024-08-01 PROCEDURE — 99309 SBSQ NF CARE MODERATE MDM 30: CPT

## 2024-08-01 NOTE — PROGRESS NOTES
PROGRESS NOTE    Subjective   Chief complaint: Renee Lombardo is a 73 y.o. female who is an acute skilled patient being seen and evaluated for weakness    HPI:  7/30/24 Patient admitted to SNF for therapy d/t weakness after recent hospitalization.   Patient requires assist with ADLs and transfers.  No new complaints.      8/1/2024 Therapy has been working with the patient to improve strength and endurance with ADLs, transfers, and mobility.  Patient continues to work toward goals.  Patient is stable and has no new complaints.  Nursing staff voices no new concerns today.          Objective   Vital signs: 116/71 - 98.2-83-18-95%    Physical Exam  Constitutional:       General: She is not in acute distress.     Appearance: Normal appearance.   HENT:      Head: Normocephalic.      Mouth/Throat:      Mouth: Mucous membranes are moist.   Eyes:      Extraocular Movements: Extraocular movements intact.   Cardiovascular:      Pulses: Normal pulses.      Heart sounds: Normal heart sounds.   Pulmonary:      Effort: Pulmonary effort is normal.      Breath sounds: Decreased breath sounds present.   Abdominal:      General: Abdomen is flat.      Palpations: Abdomen is soft.   Musculoskeletal:         General: Swelling present. Normal range of motion.      Cervical back: Neck supple.      Comments:  L LE splinted- MSP intact- Swelling to toes   Skin:     General: Skin is warm and dry.      Capillary Refill: Capillary refill takes less than 2 seconds.   Neurological:      Mental Status: She is alert and oriented to person, place, and time.   Psychiatric:         Mood and Affect: Mood normal.         Behavior: Behavior is cooperative.         Assessment/Plan   Problem List Items Addressed This Visit       Chronic obstructive pulmonary disease (Multi)     No complaints of SOB, Rales, edema  Albuterol budesonide Trelegy  DuoNebs as needed  Monitor           CHF (congestive heart failure) (Multi)      Stable   Continue Lasix T, TH, SA,  PRICE   EMILIANO diet   BP control         Gastroesophageal reflux disease      no complaints   reinforced sitting up after eating   Carafate         HTN (hypertension) - Primary     BP within goal  Lasix    Monitor BMP  Monitor BP          Medications, treatments, and labs reviewed  Continue medications and treatments as listed in EMR      Scribe Attestation  IChet Scribe   attest that this documentation has been prepared under the direction and in the presence of CARLOS ALBERTO Ramirez    Provider Attestation - Scribe documentation  All medical record entries made by the Scribe were at my direction and personally dictated by me. I have reviewed the chart and agree that the record accurately reflects my personal performance of the history, physical exam, discussion and plan.   CARLOS ALBERTO Ramirez

## 2024-08-01 NOTE — PROGRESS NOTES
PROGRESS NOTE    Subjective   Chief complaint: Renee Lombardo is a 73 y.o. female who is an acute skilled patient being seen and evaluated for weakness    HPI:  7/30/24 Patient admitted to SNF for therapy d/t weakness after recent hospitalization.   Patient requires assist with ADLs and transfers.  No new complaints.      Objective   Vital signs: 125/86, 97.8, 87, 18, 94%    Physical Exam  Constitutional:       General: She is not in acute distress.  Eyes:      Extraocular Movements: Extraocular movements intact.   Pulmonary:      Effort: Pulmonary effort is normal.   Musculoskeletal:      Cervical back: Neck supple.   Neurological:      Mental Status: She is alert.   Psychiatric:         Mood and Affect: Mood normal.         Behavior: Behavior is cooperative.         Assessment/Plan   Problem List Items Addressed This Visit       CHF (congestive heart failure) (Multi)      no SOB, Rales, edema   control BP   EMILIANO diet   monitor weights   Lasix            Gastroesophageal reflux disease      no complaints   reinforced sitting up after eating   Carafate            HTN (hypertension) - Primary      BP within goal   Continue Lasix T, TH, SA, PRICE   routine BMP   monitor BP            Anemia     Monitor hgb           Medications, treatments, and labs reviewed  Continue medications and treatments as listed in PCC    Scribe Attestation  I, Mildred Glasgow   attest that this documentation has been prepared under the direction and in the presence of CARLOS ALBERTO Hughes.    Provider Attestation - Scribe documentation  All medical record entries made by the Scribe were at my direction and personally dictated by me. I have reviewed the chart and agree that the record accurately reflects my personal performance of the history, physical exam, discussion and plan.    CARLOS ALBERTO Hughes

## 2024-08-01 NOTE — LETTER
Patient: Renee Lombardo  : 1950    Encounter Date: 2024    PROGRESS NOTE    Subjective  Chief complaint: Renee Lombardo is a 73 y.o. female who is an acute skilled patient being seen and evaluated for weakness    HPI:  24 Patient admitted to SNF for therapy d/t weakness after recent hospitalization.   Patient requires assist with ADLs and transfers.  No new complaints.      2024 Therapy has been working with the patient to improve strength and endurance with ADLs, transfers, and mobility.  Patient continues to work toward goals.  Patient is stable and has no new complaints.  Nursing staff voices no new concerns today.          Objective  Vital signs: 116/71 - 98.2-83-18-95%    Physical Exam  Constitutional:       General: She is not in acute distress.     Appearance: Normal appearance.   HENT:      Head: Normocephalic.      Mouth/Throat:      Mouth: Mucous membranes are moist.   Eyes:      Extraocular Movements: Extraocular movements intact.   Cardiovascular:      Pulses: Normal pulses.      Heart sounds: Normal heart sounds.   Pulmonary:      Effort: Pulmonary effort is normal.      Breath sounds: Decreased breath sounds present.   Abdominal:      General: Abdomen is flat.      Palpations: Abdomen is soft.   Musculoskeletal:         General: Swelling present. Normal range of motion.      Cervical back: Neck supple.      Comments:  L LE splinted- MSP intact- Swelling to toes   Skin:     General: Skin is warm and dry.      Capillary Refill: Capillary refill takes less than 2 seconds.   Neurological:      Mental Status: She is alert and oriented to person, place, and time.   Psychiatric:         Mood and Affect: Mood normal.         Behavior: Behavior is cooperative.         Assessment/Plan  Problem List Items Addressed This Visit       Chronic obstructive pulmonary disease (Multi)     No complaints of SOB, Rales, edema  Albuterol budesonide Trelegy  DuShanae as needed  Monitor           CHF  (congestive heart failure) (Multi)      Stable   Continue Lasix T, TH, SA, PRICE   EMILIANO diet   BP control         Gastroesophageal reflux disease      no complaints   reinforced sitting up after eating   Carafate         HTN (hypertension) - Primary     BP within goal  Lasix    Monitor BMP  Monitor BP          Medications, treatments, and labs reviewed  Continue medications and treatments as listed in EMR      Scribe Attestation  Chet POND Scribe   attest that this documentation has been prepared under the direction and in the presence of CARLOS ALBERTO Ramirez    Provider Attestation - Scribe documentation  All medical record entries made by the Scribe were at my direction and personally dictated by me. I have reviewed the chart and agree that the record accurately reflects my personal performance of the history, physical exam, discussion and plan.   CARLOS ALBERTO Ramirez        Electronically Signed By: CARLOS ALBERTO Ramirez   8/4/24 10:32 AM

## 2024-08-02 ENCOUNTER — NURSING HOME VISIT (OUTPATIENT)
Dept: POST ACUTE CARE | Facility: EXTERNAL LOCATION | Age: 74
End: 2024-08-02
Payer: MEDICARE

## 2024-08-02 DIAGNOSIS — S82.892A CLOSED LEFT ANKLE FRACTURE, INITIAL ENCOUNTER: ICD-10-CM

## 2024-08-02 DIAGNOSIS — I10 PRIMARY HYPERTENSION: ICD-10-CM

## 2024-08-02 DIAGNOSIS — I50.9 CONGESTIVE HEART FAILURE, UNSPECIFIED HF CHRONICITY, UNSPECIFIED HEART FAILURE TYPE (MULTI): ICD-10-CM

## 2024-08-02 DIAGNOSIS — J44.9 CHRONIC OBSTRUCTIVE PULMONARY DISEASE, UNSPECIFIED COPD TYPE (MULTI): Primary | ICD-10-CM

## 2024-08-02 PROCEDURE — 99309 SBSQ NF CARE MODERATE MDM 30: CPT

## 2024-08-02 NOTE — LETTER
Patient: Renee Lombardo  : 1950    Encounter Date: 2024    PROGRESS NOTE    Subjective  Chief complaint: Renee Lombardo is a 73 y.o. female who is an acute skilled patient being seen and evaluated for weakness    HPI:  24 Patient admitted to SNF for therapy d/t weakness after recent hospitalization.   Patient requires assist with ADLs and transfers.  No new complaints.      2024 Therapy has been working with the patient to improve strength and endurance with ADLs, transfers, and mobility.  Patient continues to work toward goals.  Patient is stable and has no new complaints.  Nursing staff voices no new concerns today.    24 Patient has been working in therapy to improve strength, endurance, and ADLs.  Patient continues to work toward goals. No new concerns today.  Denies n/v/f/c pain.            Objective  Vital signs:  116/71, 98.2, 83, 18, 95%    Physical Exam  Constitutional:       General: She is not in acute distress.     Appearance: Normal appearance.   HENT:      Head: Normocephalic.      Mouth/Throat:      Mouth: Mucous membranes are moist.   Eyes:      Extraocular Movements: Extraocular movements intact.   Cardiovascular:      Pulses: Normal pulses.      Heart sounds: Normal heart sounds.   Pulmonary:      Effort: Pulmonary effort is normal.      Breath sounds: Decreased breath sounds and wheezing present.   Abdominal:      General: Abdomen is flat.      Palpations: Abdomen is soft.   Musculoskeletal:         General: Swelling present. Normal range of motion.      Cervical back: Neck supple.      Comments:  L LE splinted- MSP intact- Swelling to toes   Skin:     General: Skin is warm and dry.      Capillary Refill: Capillary refill takes less than 2 seconds.   Neurological:      Mental Status: She is alert and oriented to person, place, and time.   Psychiatric:         Mood and Affect: Mood normal.         Behavior: Behavior is cooperative.         Assessment/Plan  Problem List  Items Addressed This Visit       Closed left ankle fracture, initial encounter      L LE splinted   MSP intact   Pain management   Elevation   therapy         Chronic obstructive pulmonary disease (Multi) - Primary      scattered wheezing, diminished lung sounds   no SOB   + nonproductive cough   Albuterol - budesonide, Trelegy   CXR, Mucinex, DuoNebs             CHF (congestive heart failure) (Multi)      no SOB, Rales, edema   control BP   EMILIANO diet   monitor weights   Lasix         HTN (hypertension)      BP within goal   Continue Lasix T, TH, SA, PRICE   routine BMP   monitor BP          Medications, treatments, and labs reviewed  Continue medications and treatments as listed in EMR    CARLOS ALBERTO Ramirez      Electronically Signed By: CARLOS ALBERTO Ramirez   8/4/24 12:07 PM

## 2024-08-04 NOTE — ASSESSMENT & PLAN NOTE
scattered wheezing, diminished lung sounds   no SOB   + nonproductive cough   Albuterol - budesonide, Trelegy   CXR, MucinexLevi

## 2024-08-04 NOTE — PROGRESS NOTES
PROGRESS NOTE    Subjective   Chief complaint: Renee Lombardo is a 73 y.o. female who is an acute skilled patient being seen and evaluated for weakness    HPI:  7/30/24 Patient admitted to SNF for therapy d/t weakness after recent hospitalization.   Patient requires assist with ADLs and transfers.  No new complaints.      8/1/2024 Therapy has been working with the patient to improve strength and endurance with ADLs, transfers, and mobility.  Patient continues to work toward goals.  Patient is stable and has no new complaints.  Nursing staff voices no new concerns today.    8/2/24 Patient has been working in therapy to improve strength, endurance, and ADLs.  Patient continues to work toward goals. No new concerns today.  Denies n/v/f/c pain.            Objective   Vital signs:  116/71, 98.2, 83, 18, 95%    Physical Exam  Constitutional:       General: She is not in acute distress.     Appearance: Normal appearance.   HENT:      Head: Normocephalic.      Mouth/Throat:      Mouth: Mucous membranes are moist.   Eyes:      Extraocular Movements: Extraocular movements intact.   Cardiovascular:      Pulses: Normal pulses.      Heart sounds: Normal heart sounds.   Pulmonary:      Effort: Pulmonary effort is normal.      Breath sounds: Decreased breath sounds and wheezing present.   Abdominal:      General: Abdomen is flat.      Palpations: Abdomen is soft.   Musculoskeletal:         General: Swelling present. Normal range of motion.      Cervical back: Neck supple.      Comments:  L LE splinted- MSP intact- Swelling to toes   Skin:     General: Skin is warm and dry.      Capillary Refill: Capillary refill takes less than 2 seconds.   Neurological:      Mental Status: She is alert and oriented to person, place, and time.   Psychiatric:         Mood and Affect: Mood normal.         Behavior: Behavior is cooperative.         Assessment/Plan   Problem List Items Addressed This Visit       Closed left ankle fracture, initial  encounter      L LE splinted   MSP intact   Pain management   Elevation   therapy         Chronic obstructive pulmonary disease (Multi) - Primary      scattered wheezing, diminished lung sounds   no SOB   + nonproductive cough   Albuterol - budesonide, Trelegy   CXR, MucinLevi gilmore             CHF (congestive heart failure) (Multi)      no SOB, Rales, edema   control BP   EMILIANO diet   monitor weights   Lasix         HTN (hypertension)      BP within goal   Continue Lasix T, TH, SA, PRICE   routine BMP   monitor BP          Medications, treatments, and labs reviewed  Continue medications and treatments as listed in EMR    Shawanda Valle, APRN-CNP

## 2024-08-05 ENCOUNTER — NURSING HOME VISIT (OUTPATIENT)
Dept: POST ACUTE CARE | Facility: EXTERNAL LOCATION | Age: 74
End: 2024-08-05
Payer: MEDICARE

## 2024-08-05 DIAGNOSIS — D64.9 ANEMIA, UNSPECIFIED TYPE: ICD-10-CM

## 2024-08-05 DIAGNOSIS — J44.9 CHRONIC OBSTRUCTIVE PULMONARY DISEASE, UNSPECIFIED COPD TYPE (MULTI): ICD-10-CM

## 2024-08-05 DIAGNOSIS — I10 PRIMARY HYPERTENSION: Primary | ICD-10-CM

## 2024-08-05 DIAGNOSIS — I50.9 CONGESTIVE HEART FAILURE, UNSPECIFIED HF CHRONICITY, UNSPECIFIED HEART FAILURE TYPE (MULTI): ICD-10-CM

## 2024-08-05 PROCEDURE — 99309 SBSQ NF CARE MODERATE MDM 30: CPT | Performed by: REGISTERED NURSE

## 2024-08-05 NOTE — LETTER
Patient: Renee Lombardo  : 1950    Encounter Date: 2024    PROGRESS NOTE    Subjective  Chief complaint: Renee Lombardo is a 73 y.o. female who is an acute skilled patient being seen and evaluated for weakness    HPI:  24 Patient has no new complaints or concerns today.  Continues working towards goals in therapy.  Denies constitutional symptoms.    Objective  Vital signs: 116/71, 98.2, 83, 18, 95%    Physical Exam  Constitutional:       General: She is not in acute distress.  Eyes:      Extraocular Movements: Extraocular movements intact.   Pulmonary:      Effort: Pulmonary effort is normal.   Musculoskeletal:      Cervical back: Neck supple.   Neurological:      Mental Status: She is alert.   Psychiatric:         Mood and Affect: Mood normal.         Behavior: Behavior is cooperative.         Assessment/Plan  Problem List Items Addressed This Visit       Chronic obstructive pulmonary disease (Multi)      Albuterol - budesonide, Trelegy   CXR, Mucinex, DuoNebs         CHF (congestive heart failure) (Multi)      no SOB, Rales, edema   control BP   EMILIANO diet   monitor weights   Lasix            HTN (hypertension) - Primary      BP within goal   Continue Lasix T, TH, SA, PRICE   routine BMP   monitor BP            Anemia     Monitor hgb           Medications, treatments, and labs reviewed  Continue medications and treatments as listed in PCC    Scribe Attestation  IClare Scribe   attest that this documentation has been prepared under the direction and in the presence of CARLOS ALBERTO Hughes.    Provider Attestation - Scribe documentation  All medical record entries made by the Scribe were at my direction and personally dictated by me. I have reviewed the chart and agree that the record accurately reflects my personal performance of the history, physical exam, discussion and plan.    CARLOS ALBERTO Hughes        Electronically Signed By: CARLOS ALBERTO Hughes    8/11/24  3:28 PM

## 2024-08-05 NOTE — PROGRESS NOTES
PROGRESS NOTE    Subjective   Chief complaint: Renee Lombardo is a 73 y.o. female patient being seen and evaluated for monthly general medical care and follow-up    HPI:  Patient presents for general medical care and f/u.  Patient seen and examined at bedside.  No issues per nursing.  Patient has no acute complaints.        Objective   Vital signs: 116/71, 98.2, 83, 18, 95%    Physical Exam  Constitutional:       General: She is not in acute distress.  Eyes:      Extraocular Movements: Extraocular movements intact.   Pulmonary:      Effort: Pulmonary effort is normal.   Musculoskeletal:      Cervical back: Neck supple.   Neurological:      Mental Status: She is alert.   Psychiatric:         Mood and Affect: Mood normal.         Behavior: Behavior is cooperative.         Assessment/Plan   Problem List Items Addressed This Visit       Chronic obstructive pulmonary disease (Multi) - Primary      Albuterol - budesonide, Trelegy   CXR, Mucinex, DuoNebs         CHF (congestive heart failure) (Multi)      no SOB, Rales, edema   control BP   EMILIANO diet   monitor weights   Lasix            HTN (hypertension)      BP within goal   Continue Lasix T, TH, SA, PRICE   routine BMP   monitor BP            Anemia     Monitor hgb           Medications, treatments, and labs reviewed  Continue medications and treatments as listed in EMR    Scribe Attestation  IClare Scribe   attest that this documentation has been prepared under the direction and in the presence of CARLOS ALBERTO Hughes.    Provider Attestation - Scribe documentation  All medical record entries made by the Scribe were at my direction and personally dictated by me. I have reviewed the chart and agree that the record accurately reflects my personal performance of the history, physical exam, discussion and plan.    CARLOS ALBERTO Hughes

## 2024-08-06 ENCOUNTER — NURSING HOME VISIT (OUTPATIENT)
Dept: POST ACUTE CARE | Facility: EXTERNAL LOCATION | Age: 74
End: 2024-08-06
Payer: MEDICARE

## 2024-08-06 DIAGNOSIS — I10 PRIMARY HYPERTENSION: Primary | ICD-10-CM

## 2024-08-06 DIAGNOSIS — J44.9 CHRONIC OBSTRUCTIVE PULMONARY DISEASE, UNSPECIFIED COPD TYPE (MULTI): ICD-10-CM

## 2024-08-06 DIAGNOSIS — I50.9 CONGESTIVE HEART FAILURE, UNSPECIFIED HF CHRONICITY, UNSPECIFIED HEART FAILURE TYPE (MULTI): ICD-10-CM

## 2024-08-06 PROCEDURE — 99308 SBSQ NF CARE LOW MDM 20: CPT | Performed by: REGISTERED NURSE

## 2024-08-06 NOTE — PROGRESS NOTES
PROGRESS NOTE    Subjective   Chief complaint: Renee Lombardo is a 73 y.o. female who is an acute skilled patient being seen and evaluated for weakness    HPI:  8/5/24 Patient has no new complaints or concerns today.  Continues working towards goals in therapy.  Denies constitutional symptoms.    8/6/24 Patient presents for f/u therapy and general medical care.  Patient seen and examined at beside.  Therapy has been working with the patient to improve strength, endurance, ADLs, and transfers d/t generalized weakness.  Patient has no acute concerns today.    Objective   Vital signs: 116/71, 98.2, 83, 18, 95%    Physical Exam  Constitutional:       General: She is not in acute distress.  Eyes:      Extraocular Movements: Extraocular movements intact.   Pulmonary:      Effort: Pulmonary effort is normal.   Musculoskeletal:      Cervical back: Neck supple.   Neurological:      Mental Status: She is alert.   Psychiatric:         Mood and Affect: Mood normal.         Behavior: Behavior is cooperative.         Assessment/Plan   Problem List Items Addressed This Visit       Chronic obstructive pulmonary disease (Multi)      Albuterol - budesonide, Trelegy            CHF (congestive heart failure) (Multi)      no SOB, Rales, edema   control BP   EMILIANO diet   monitor weights   Lasix            HTN (hypertension) - Primary      BP within goal   Continue Lasix T, TH, SA, PRICE   routine BMP   monitor BP             Medications, treatments, and labs reviewed  Continue medications and treatments as listed in PCC    Scribe Attestation  I, Mildred Glasgow   attest that this documentation has been prepared under the direction and in the presence of CARLOS ALBERTO Hughes.    Provider Attestation - Scribe documentation  All medical record entries made by the Scribe were at my direction and personally dictated by me. I have reviewed the chart and agree that the record accurately reflects my personal performance of the  history, physical exam, discussion and plan.    Marco A Hdez, APRN-CNP

## 2024-08-06 NOTE — PROGRESS NOTES
PROGRESS NOTE    Subjective   Chief complaint: Renee Lombardo is a 73 y.o. female who is an acute skilled patient being seen and evaluated for weakness    HPI:  8/5/24 Patient has no new complaints or concerns today.  Continues working towards goals in therapy.  Denies constitutional symptoms.    Objective   Vital signs: 116/71, 98.2, 83, 18, 95%    Physical Exam  Constitutional:       General: She is not in acute distress.  Eyes:      Extraocular Movements: Extraocular movements intact.   Pulmonary:      Effort: Pulmonary effort is normal.   Musculoskeletal:      Cervical back: Neck supple.   Neurological:      Mental Status: She is alert.   Psychiatric:         Mood and Affect: Mood normal.         Behavior: Behavior is cooperative.         Assessment/Plan   Problem List Items Addressed This Visit       Chronic obstructive pulmonary disease (Multi)      Albuterol - budesonide, Trelegy   CXR, Mucinex, DuoNebs         CHF (congestive heart failure) (Multi)      no SOB, Rales, edema   control BP   EMILIANO diet   monitor weights   Lasix            HTN (hypertension) - Primary      BP within goal   Continue Lasix T, TH, SA, PRICE   routine BMP   monitor BP            Anemia     Monitor hgb           Medications, treatments, and labs reviewed  Continue medications and treatments as listed in [facility]    Scribe Attestation  I, Mildred Glasgow   attest that this documentation has been prepared under the direction and in the presence of CARLOS ALBERTO Hughes.    Provider Attestation - Scribe documentation  All medical record entries made by the Scribe were at my direction and personally dictated by me. I have reviewed the chart and agree that the record accurately reflects my personal performance of the history, physical exam, discussion and plan.    CARLOS ALBERTO Hughes

## 2024-08-06 NOTE — LETTER
Patient: Renee Lombardo  : 1950    Encounter Date: 2024    PROGRESS NOTE    Subjective  Chief complaint: Renee Lombardo is a 73 y.o. female who is an acute skilled patient being seen and evaluated for weakness    HPI:  24 Patient has no new complaints or concerns today.  Continues working towards goals in therapy.  Denies constitutional symptoms.    24 Patient presents for f/u therapy and general medical care.  Patient seen and examined at [facility].  Therapy has been working with the patient to improve strength, endurance, ADLs, and transfers d/t generalized weakness.  Patient has no acute concerns today.    Objective  Vital signs: 116/71, 98.2, 83, 18, 95%    Physical Exam  Constitutional:       General: She is not in acute distress.  Eyes:      Extraocular Movements: Extraocular movements intact.   Pulmonary:      Effort: Pulmonary effort is normal.   Musculoskeletal:      Cervical back: Neck supple.   Neurological:      Mental Status: She is alert.   Psychiatric:         Mood and Affect: Mood normal.         Behavior: Behavior is cooperative.         Assessment/Plan  Problem List Items Addressed This Visit       Chronic obstructive pulmonary disease (Multi)      Albuterol - budesonide, Trelegy            CHF (congestive heart failure) (Multi)      no SOB, Rales, edema   control BP   EMILIANO diet   monitor weights   Lasix            HTN (hypertension) - Primary      BP within goal   Continue Lasix T, TH, SA, PRICE   routine BMP   monitor BP             Medications, treatments, and labs reviewed  Continue medications and treatments as listed in [facility]    Scribe Attestation  I, Mildred Glasgow   attest that this documentation has been prepared under the direction and in the presence of CARLOS ALBERTO Hughes.    Provider Attestation - Scribe documentation  All medical record entries made by the Scribe were at my direction and personally dictated by me. I have reviewed the chart and agree  that the record accurately reflects my personal performance of the history, physical exam, discussion and plan.    CARLOS ALBERTO Hughes        Electronically Signed By: CARLOS ALBERTO Hughes   8/12/24  5:33 PM

## 2024-08-08 ENCOUNTER — NURSING HOME VISIT (OUTPATIENT)
Dept: POST ACUTE CARE | Facility: EXTERNAL LOCATION | Age: 74
End: 2024-08-08
Payer: MEDICARE

## 2024-08-08 DIAGNOSIS — K21.9 GASTROESOPHAGEAL REFLUX DISEASE WITHOUT ESOPHAGITIS: ICD-10-CM

## 2024-08-08 DIAGNOSIS — I10 PRIMARY HYPERTENSION: Primary | ICD-10-CM

## 2024-08-08 DIAGNOSIS — S82.892A CLOSED LEFT ANKLE FRACTURE, INITIAL ENCOUNTER: ICD-10-CM

## 2024-08-08 DIAGNOSIS — I50.9 CONGESTIVE HEART FAILURE, UNSPECIFIED HF CHRONICITY, UNSPECIFIED HEART FAILURE TYPE (MULTI): ICD-10-CM

## 2024-08-08 DIAGNOSIS — J44.9 CHRONIC OBSTRUCTIVE PULMONARY DISEASE, UNSPECIFIED COPD TYPE (MULTI): ICD-10-CM

## 2024-08-08 PROCEDURE — 99309 SBSQ NF CARE MODERATE MDM 30: CPT

## 2024-08-08 NOTE — LETTER
Patient: Renee Lombardo  : 1950    Encounter Date: 2024    PROGRESS NOTE    Subjective  Chief complaint: Renee Lombardo is a 73 y.o. female who is an acute skilled patient being seen and evaluated for weakness    HPI:  24 Patient has no new complaints or concerns today.  Continues working towards goals in therapy.  Denies constitutional symptoms.    24 Patient presents for f/u therapy and general medical care.  Patient seen and examined at beside.  Therapy has been working with the patient to improve strength, endurance, ADLs, and transfers d/t generalized weakness.  Patient has no acute concerns today.    24.  Patient continues to work with therapy to work towards goals.  States doing well with STS x 3, transferring and with leg exercises.  Patient is  NWB of [facility].  Ortho follow-up appointment  scheduled for .  no F/C/N/V/D, SOB, cough. No concerns per nursing.          Objective  Vital signs: 116/71 - 98.2-83-18-95%    Physical Exam  Constitutional:       General: She is not in acute distress.     Appearance: Normal appearance.   HENT:      Head: Normocephalic.      Mouth/Throat:      Mouth: Mucous membranes are moist.   Eyes:      Extraocular Movements: Extraocular movements intact.   Cardiovascular:      Rate and Rhythm: Normal rate and regular rhythm.      Pulses: Normal pulses.      Heart sounds: Normal heart sounds.   Pulmonary:      Effort: Pulmonary effort is normal.      Breath sounds: Wheezing present.   Abdominal:      General: Abdomen is flat.      Palpations: Abdomen is soft.   Musculoskeletal:         General: Normal range of motion.      Cervical back: Neck supple.      Comments:  L LE ace wrapped- MSP intact   Skin:     General: Skin is warm and dry.      Capillary Refill: Capillary refill takes less than 2 seconds.   Neurological:      Mental Status: She is alert and oriented to person, place, and time.   Psychiatric:         Mood and Affect: Mood normal.          Behavior: Behavior normal. Behavior is cooperative.         Assessment/Plan  Problem List Items Addressed This Visit       Closed left ankle fracture, initial encounter      L LE splinted   MSP intact   Pain management   Elevation   Therapy   Follow-up with Ortho 8/16         Chronic obstructive pulmonary disease (Multi)      Albuterol - budesonide, Trelegy   CXR, Mucinex, DuoNebs         CHF (congestive heart failure) (Multi)      no SOB, Rales, edema   control BP   EMILIANO diet   monitor weights   Lasix            Gastroesophageal reflux disease      no complaints   reinforced sitting up after eating   Carafate            HTN (hypertension) - Primary      BP within goal   Continue Lasix T, TH, SA, PRICE   routine BMP   monitor BP             Medications, treatments, and labs reviewed  Continue medications and treatments as listed in EMR      Scribe Attestation  IChet Scribe   attest that this documentation has been prepared under the direction and in the presence of CARLOS ALBERTO Ramirez    Provider Attestation - Scribe documentation  All medical record entries made by the Scribe were at my direction and personally dictated by me. I have reviewed the chart and agree that the record accurately reflects my personal performance of the history, physical exam, discussion and plan.   CARLOS ALBERTO Ramirez        Electronically Signed By: CARLOS ALBERTO Ramirez   8/10/24  6:28 PM

## 2024-08-08 NOTE — PROGRESS NOTES
PROGRESS NOTE    Subjective   Chief complaint: Renee Lombardo is a 73 y.o. female who is an acute skilled patient being seen and evaluated for weakness    HPI:  8/5/24 Patient has no new complaints or concerns today.  Continues working towards goals in therapy.  Denies constitutional symptoms.    8/6/24 Patient presents for f/u therapy and general medical care.  Patient seen and examined at beside.  Therapy has been working with the patient to improve strength, endurance, ADLs, and transfers d/t generalized weakness.  Patient has no acute concerns today.    8/8/24.  Patient continues to work with therapy to work towards goals.  States doing well with STS x 3, transferring and with leg exercises.  Patient is  NWB of [facility].  Ortho follow-up appointment  scheduled for 8/16.  no F/C/N/V/D, SOB, cough. No concerns per nursing.          Objective   Vital signs: 116/71 - 98.2-83-18-95%    Physical Exam  Constitutional:       General: She is not in acute distress.     Appearance: Normal appearance.   HENT:      Head: Normocephalic.      Mouth/Throat:      Mouth: Mucous membranes are moist.   Eyes:      Extraocular Movements: Extraocular movements intact.   Cardiovascular:      Rate and Rhythm: Normal rate and regular rhythm.      Pulses: Normal pulses.      Heart sounds: Normal heart sounds.   Pulmonary:      Effort: Pulmonary effort is normal.      Breath sounds: Wheezing present.   Abdominal:      General: Abdomen is flat.      Palpations: Abdomen is soft.   Musculoskeletal:         General: Normal range of motion.      Cervical back: Neck supple.      Comments:  L JASMEET ace wrapped- MSP intact   Skin:     General: Skin is warm and dry.      Capillary Refill: Capillary refill takes less than 2 seconds.   Neurological:      Mental Status: She is alert and oriented to person, place, and time.   Psychiatric:         Mood and Affect: Mood normal.         Behavior: Behavior normal. Behavior is cooperative.         Assessment/Plan    Problem List Items Addressed This Visit       Closed left ankle fracture, initial encounter      L LE splinted   MSP intact   Pain management   Elevation   Therapy   Follow-up with Ortho 8/16         Chronic obstructive pulmonary disease (Multi)      Albuterol - budesonide, Trelegy   CXR, Mucinex, DuoNesusi         CHF (congestive heart failure) (Multi)      no SOB, Rales, edema   control BP   EMILIANO diet   monitor weights   Lasix            Gastroesophageal reflux disease      no complaints   reinforced sitting up after eating   Carafate            HTN (hypertension) - Primary      BP within goal   Continue Lasix T, TH, SA, PRICE   routine BMP   monitor BP             Medications, treatments, and labs reviewed  Continue medications and treatments as listed in EMR      Scribe Attestation  Chet POND Scribe   attest that this documentation has been prepared under the direction and in the presence of CARLOS ALBERTO Ramirez    Provider Attestation - Scribe documentation  All medical record entries made by the Scribe were at my direction and personally dictated by me. I have reviewed the chart and agree that the record accurately reflects my personal performance of the history, physical exam, discussion and plan.   CARLOS ALBERTO Ramirez

## 2024-08-09 ENCOUNTER — NURSING HOME VISIT (OUTPATIENT)
Dept: POST ACUTE CARE | Facility: EXTERNAL LOCATION | Age: 74
End: 2024-08-09
Payer: MEDICARE

## 2024-08-09 DIAGNOSIS — I50.9 CONGESTIVE HEART FAILURE, UNSPECIFIED HF CHRONICITY, UNSPECIFIED HEART FAILURE TYPE (MULTI): ICD-10-CM

## 2024-08-09 DIAGNOSIS — J44.9 CHRONIC OBSTRUCTIVE PULMONARY DISEASE, UNSPECIFIED COPD TYPE (MULTI): ICD-10-CM

## 2024-08-09 DIAGNOSIS — I10 PRIMARY HYPERTENSION: ICD-10-CM

## 2024-08-09 DIAGNOSIS — S82.892A CLOSED LEFT ANKLE FRACTURE, INITIAL ENCOUNTER: Primary | ICD-10-CM

## 2024-08-09 PROCEDURE — 99309 SBSQ NF CARE MODERATE MDM 30: CPT

## 2024-08-09 NOTE — LETTER
Patient: Renee Lombardo  : 1950    Encounter Date: 2024    PROGRESS NOTE    Subjective  Chief complaint: Renee Lombardo is a 73 y.o. female who is an acute skilled patient being seen and evaluated for weakness    HPI:  24 Patient has no new complaints or concerns today.  Continues working towards goals in therapy.  Denies constitutional symptoms.    24 Patient presents for f/u therapy and general medical care.  Patient seen and examined at [facility].  Therapy has been working with the patient to improve strength, endurance, ADLs, and transfers d/t generalized weakness.  Patient has no acute concerns today.    24.  Patient continues to work with therapy to work towards goals.  Patient filed a complaint for the nursing group for giving wrong meds.  Patient was oriented and agitated about a peanut butter and jelly sandwich.  Patient in physical therapy reports good progress, STS x 3, transferring, exercising.  Patient is nonweightbearing has an Ortho appointment .  No concerns per nursing.    24 Patient in therapy d/t generalized weakness and left ankle fracture.  Continues to work toward goals in therapy.  Patient is supervised to standby assist on mobility.  Patient using wheeled walker for assist during transfers. Patient is supervised to standby assist to contact-guard assist on transfers. No new complaints at this time.  No concerns per nursing.          Objective  Vital signs: 116/71 - 98.2-83-18-98%    Physical Exam  Constitutional:       General: She is not in acute distress.     Appearance: Normal appearance.   HENT:      Head: Normocephalic.      Mouth/Throat:      Mouth: Mucous membranes are moist.   Eyes:      Extraocular Movements: Extraocular movements intact.   Cardiovascular:      Rate and Rhythm: Normal rate and regular rhythm.      Pulses: Normal pulses.      Heart sounds: Normal heart sounds.   Pulmonary:      Effort: Pulmonary effort is normal.      Breath sounds:  Wheezing present.   Abdominal:      General: Bowel sounds are normal.      Palpations: Abdomen is soft.   Musculoskeletal:         General: Normal range of motion.      Cervical back: Normal range of motion and neck supple.      Comments:  L LE ace wrapped- MSP intact   Skin:     General: Skin is warm and dry.      Capillary Refill: Capillary refill takes less than 2 seconds.   Neurological:      Mental Status: She is alert and oriented to person, place, and time.   Psychiatric:         Mood and Affect: Mood normal.         Behavior: Behavior normal. Behavior is cooperative.         Assessment/Plan  Problem List Items Addressed This Visit       Closed left ankle fracture, initial encounter - Primary      L JASMEET splinted   MSP intact   Pain management   Elevation   therapy         Chronic obstructive pulmonary disease (Multi)      Albuterol - budesonide, Trelegy   CXR, Mucinex, DuoNebs         CHF (congestive heart failure) (Multi)      no SOB, Rales, edema   control BP   EMILIANO diet   monitor weights   Lasix            HTN (hypertension)      BP within goal   Continue Lasix T, TH, SA, PRICE   routine BMP   monitor BP             Medications, treatments, and labs reviewed  Continue medications and treatments as listed in EMR      Scribe Attestation  IChet Scribe   attest that this documentation has been prepared under the direction and in the presence of CARLOS ALBERTO Ramirez    Provider Attestation - Scribe documentation  All medical record entries made by the Scribe were at my direction and personally dictated by me. I have reviewed the chart and agree that the record accurately reflects my personal performance of the history, physical exam, discussion and plan.   CARLOS ALBERTO Ramirez        Electronically Signed By: CARLOS ALBERTO Ramirez   8/10/24  7:54 PM

## 2024-08-10 NOTE — PROGRESS NOTES
PROGRESS NOTE    Subjective   Chief complaint: Renee Lombardo is a 73 y.o. female who is an acute skilled patient being seen and evaluated for weakness    HPI:  8/5/24 Patient has no new complaints or concerns today.  Continues working towards goals in therapy.  Denies constitutional symptoms.    8/6/24 Patient presents for f/u therapy and general medical care.  Patient seen and examined at beside.  Therapy has been working with the patient to improve strength, endurance, ADLs, and transfers d/t generalized weakness.  Patient has no acute concerns today.    8/8/24.  Patient continues to work with therapy to work towards goals.  Patient filed a complaint for the nursing group for giving wrong meds.  Patient was oriented and agitated about a peanut butter and jelly sandwich.  Patient in physical therapy reports good progress, STS x 3, transferring, exercising.  Patient is nonweightbearing has an Ortho appointment 8/16.  No concerns per nursing.    8/9/24 Patient in therapy d/t generalized weakness and left ankle fracture.  Continues to work toward goals in therapy.  Patient is supervised to standby assist on mobility.  Patient using wheeled walker for assist during transfers. Patient is supervised to standby assist to contact-guard assist on transfers. No new complaints at this time.  No concerns per nursing.          Objective   Vital signs: 116/71 - 98.2-83-18-98%    Physical Exam  Constitutional:       General: She is not in acute distress.     Appearance: Normal appearance.   HENT:      Head: Normocephalic.      Mouth/Throat:      Mouth: Mucous membranes are moist.   Eyes:      Extraocular Movements: Extraocular movements intact.   Cardiovascular:      Rate and Rhythm: Normal rate and regular rhythm.      Pulses: Normal pulses.      Heart sounds: Normal heart sounds.   Pulmonary:      Effort: Pulmonary effort is normal.      Breath sounds: Wheezing present.   Abdominal:      General: Bowel sounds are normal.       Palpations: Abdomen is soft.   Musculoskeletal:         General: Normal range of motion.      Cervical back: Normal range of motion and neck supple.      Comments:  L LE ace wrapped- MSP intact   Skin:     General: Skin is warm and dry.      Capillary Refill: Capillary refill takes less than 2 seconds.   Neurological:      Mental Status: She is alert and oriented to person, place, and time.   Psychiatric:         Mood and Affect: Mood normal.         Behavior: Behavior normal. Behavior is cooperative.         Assessment/Plan   Problem List Items Addressed This Visit       Closed left ankle fracture, initial encounter - Primary      L JASMEET splinted   MSP intact   Pain management   Elevation   therapy         Chronic obstructive pulmonary disease (Multi)      Albuterol - budesonide, Trelegy   CXR, Mucinex, DuoNebs         CHF (congestive heart failure) (Multi)      no SOB, Rales, edema   control BP   EMILIANO diet   monitor weights   Lasix            HTN (hypertension)      BP within goal   Continue Lasix T, TH, SA, PRICE   routine BMP   monitor BP             Medications, treatments, and labs reviewed  Continue medications and treatments as listed in EMR      Scribe Attestation  Chet POND Scribe   attest that this documentation has been prepared under the direction and in the presence of CARLOS ALBERTO Ramirez    Provider Attestation - Scribe documentation  All medical record entries made by the Scribe were at my direction and personally dictated by me. I have reviewed the chart and agree that the record accurately reflects my personal performance of the history, physical exam, discussion and plan.   CARLOS ALBERTO Ramirez

## 2024-08-10 NOTE — ASSESSMENT & PLAN NOTE
BP within goal   Continue Lasix T, TH, SA, PRICE   routine BMP   monitor BP      "Per pt, Domi Maloney will not fill her script for Vyvanse until June 4th \"because it shows that it was sent to another location and filled on May 11th\". She is asking for a call back to clarify.   "

## 2024-08-12 ENCOUNTER — NURSING HOME VISIT (OUTPATIENT)
Dept: POST ACUTE CARE | Facility: EXTERNAL LOCATION | Age: 74
End: 2024-08-12
Payer: MEDICARE

## 2024-08-12 DIAGNOSIS — I10 PRIMARY HYPERTENSION: Primary | ICD-10-CM

## 2024-08-12 DIAGNOSIS — J44.9 CHRONIC OBSTRUCTIVE PULMONARY DISEASE, UNSPECIFIED COPD TYPE (MULTI): ICD-10-CM

## 2024-08-12 DIAGNOSIS — K21.9 GASTROESOPHAGEAL REFLUX DISEASE WITHOUT ESOPHAGITIS: ICD-10-CM

## 2024-08-12 DIAGNOSIS — I50.9 CONGESTIVE HEART FAILURE, UNSPECIFIED HF CHRONICITY, UNSPECIFIED HEART FAILURE TYPE (MULTI): ICD-10-CM

## 2024-08-12 PROCEDURE — 99309 SBSQ NF CARE MODERATE MDM 30: CPT | Performed by: REGISTERED NURSE

## 2024-08-12 NOTE — LETTER
Patient: Renee Lombardo  : 1950    Encounter Date: 2024    PROGRESS NOTE    Subjective  Chief complaint: Renee Lombardo is a 73 y.o. female who is an acute skilled patient being seen and evaluated for weakness    HPI:  24 Patient has no new complaints or concerns today.  Continues working towards goals in therapy.  Denies constitutional symptoms.    24 Patient presents for f/u therapy and general medical care.  Patient seen and examined at [facility].  Therapy has been working with the patient to improve strength, endurance, ADLs, and transfers d/t generalized weakness.  Patient has no acute concerns today.    24 Patient has been working in therapy to improve strength, endurance, and ADLs.  Patient continues to work toward goals.  No new concerns today.  Denies n/v/f/c pain.      Objective  Vital signs: 116/71, 98.2, 83, 18, 95%    Physical Exam  Constitutional:       General: She is not in acute distress.  Eyes:      Extraocular Movements: Extraocular movements intact.   Pulmonary:      Effort: Pulmonary effort is normal.   Musculoskeletal:      Cervical back: Neck supple.   Neurological:      Mental Status: She is alert.   Psychiatric:         Mood and Affect: Mood normal.         Behavior: Behavior is cooperative.         Assessment/Plan  Problem List Items Addressed This Visit       Chronic obstructive pulmonary disease (Multi)      Albuterol - budesonide, Trelegy            CHF (congestive heart failure) (Multi)      no SOB, Rales, edema   control BP   EMILIANO diet   monitor weights   Lasix            Gastroesophageal reflux disease      no complaints   reinforced sitting up after eating   Carafate            HTN (hypertension) - Primary      BP within goal   Continue Lasix T, TH, SA, PRICE   routine BMP   monitor BP             Medications, treatments, and labs reviewed  Continue medications and treatments as listed in [facility]    Scribe Attestation  I, Mildred Glasgow   attest that this  documentation has been prepared under the direction and in the presence of CARLOS ALBERTO Hughes.    Provider Attestation - Scribe documentation  All medical record entries made by the Scribe were at my direction and personally dictated by me. I have reviewed the chart and agree that the record accurately reflects my personal performance of the history, physical exam, discussion and plan.    CARLOS ALBERTO Hughes        Electronically Signed By: CARLOS ALBERTO Hughes   8/19/24 10:52 AM

## 2024-08-13 ENCOUNTER — NURSING HOME VISIT (OUTPATIENT)
Dept: POST ACUTE CARE | Facility: EXTERNAL LOCATION | Age: 74
End: 2024-08-13
Payer: MEDICARE

## 2024-08-13 DIAGNOSIS — I50.9 CONGESTIVE HEART FAILURE, UNSPECIFIED HF CHRONICITY, UNSPECIFIED HEART FAILURE TYPE (MULTI): ICD-10-CM

## 2024-08-13 DIAGNOSIS — K21.9 GASTROESOPHAGEAL REFLUX DISEASE WITHOUT ESOPHAGITIS: ICD-10-CM

## 2024-08-13 DIAGNOSIS — I10 PRIMARY HYPERTENSION: Primary | ICD-10-CM

## 2024-08-13 PROCEDURE — 99308 SBSQ NF CARE LOW MDM 20: CPT | Performed by: REGISTERED NURSE

## 2024-08-13 NOTE — PROGRESS NOTES
PROGRESS NOTE    Subjective   Chief complaint: Renee Lombardo is a 73 y.o. female who is an acute skilled patient being seen and evaluated for weakness    HPI:  8/5/24 Patient has no new complaints or concerns today.  Continues working towards goals in therapy.  Denies constitutional symptoms.    8/6/24 Patient presents for f/u therapy and general medical care.  Patient seen and examined at beside.  Therapy has been working with the patient to improve strength, endurance, ADLs, and transfers d/t generalized weakness.  Patient has no acute concerns today.    8/12/24 Patient has been working in therapy to improve strength, endurance, and ADLs.  Patient continues to work toward goals.  No new concerns today.  Denies n/v/f/c pain.      Objective   Vital signs: 116/71, 98.2, 83, 18, 95%    Physical Exam  Constitutional:       General: She is not in acute distress.  Eyes:      Extraocular Movements: Extraocular movements intact.   Pulmonary:      Effort: Pulmonary effort is normal.   Musculoskeletal:      Cervical back: Neck supple.   Neurological:      Mental Status: She is alert.   Psychiatric:         Mood and Affect: Mood normal.         Behavior: Behavior is cooperative.         Assessment/Plan   Problem List Items Addressed This Visit       Chronic obstructive pulmonary disease (Multi)      Albuterol - budesonide, Trelegy            CHF (congestive heart failure) (Multi)      no SOB, Rales, edema   control BP   EMILIANO diet   monitor weights   Lasix            Gastroesophageal reflux disease      no complaints   reinforced sitting up after eating   Carafate            HTN (hypertension) - Primary      BP within goal   Continue Lasix T, TH, SA, PRICE   routine BMP   monitor BP             Medications, treatments, and labs reviewed  Continue medications and treatments as listed in [facility]    Scribe Attestation  I, Mildred Glasgow   attest that this documentation has been prepared under the direction and in the presence of  Marco A Hdez, APRN-CNP.    Provider Attestation - Scribe documentation  All medical record entries made by the Scribe were at my direction and personally dictated by me. I have reviewed the chart and agree that the record accurately reflects my personal performance of the history, physical exam, discussion and plan.    Marco A Hdez, APRN-CNP

## 2024-08-13 NOTE — LETTER
Patient: Renee Lombardo  : 1950    Encounter Date: 2024    PROGRESS NOTE    Subjective  Chief complaint: Renee Lombardo is a 73 y.o. female who is an acute skilled patient being seen and evaluated for weakness    HPI:  24 Patient has no new complaints or concerns today.  Continues working towards goals in therapy.  Denies constitutional symptoms.    24 Patient presents for f/u therapy and general medical care.  Patient seen and examined at [facility].  Therapy has been working with the patient to improve strength, endurance, ADLs, and transfers d/t generalized weakness.  Patient has no acute concerns today.    24 Patient has been working in therapy to improve strength, endurance, and ADLs.  Patient continues to work toward goals.  No new concerns today.  Denies n/v/f/c pain.      24 Therapy has been working with the patient to improve strength and endurance with ADLs, transfers, and mobility.  Patient continues to work toward goals.  Patient is stable and has no new complaints.  Nursing staff voices no new concerns today.    Objective  Vital signs: 118/70, 97.9, 80, 18, 95%    Physical Exam  Constitutional:       General: She is not in acute distress.  Eyes:      Extraocular Movements: Extraocular movements intact.   Pulmonary:      Effort: Pulmonary effort is normal.   Musculoskeletal:      Cervical back: Neck supple.   Neurological:      Mental Status: She is alert.   Psychiatric:         Mood and Affect: Mood normal.         Behavior: Behavior is cooperative.         Assessment/Plan  Problem List Items Addressed This Visit       CHF (congestive heart failure) (Multi)      no SOB, Rales, edema   control BP   EMILIANO diet   monitor weights   Lasix            Gastroesophageal reflux disease      no complaints   reinforced sitting up after eating   Carafate            HTN (hypertension) - Primary      BP within goal   Continue Lasix T, TH, SA, PRICE   routine BMP   monitor BP              Medications, treatments, and labs reviewed  Continue medications and treatments as listed in PCC    Scribe Attestation  I, Mildred Glasgow   attest that this documentation has been prepared under the direction and in the presence of CARLOS ALBERTO Hughes.    Provider Attestation - Scribe documentation  All medical record entries made by the Scribe were at my direction and personally dictated by me. I have reviewed the chart and agree that the record accurately reflects my personal performance of the history, physical exam, discussion and plan.    CARLOS ALBERTO Hughes        Electronically Signed By: CARLOS ALBERTO Hughes   8/20/24  7:29 PM

## 2024-08-14 ENCOUNTER — NURSING HOME VISIT (OUTPATIENT)
Dept: POST ACUTE CARE | Facility: EXTERNAL LOCATION | Age: 74
End: 2024-08-14

## 2024-08-14 ENCOUNTER — OFFICE VISIT (OUTPATIENT)
Dept: ORTHOPEDIC SURGERY | Facility: CLINIC | Age: 74
End: 2024-08-14
Payer: MEDICARE

## 2024-08-14 ENCOUNTER — HOSPITAL ENCOUNTER (OUTPATIENT)
Dept: RADIOLOGY | Facility: CLINIC | Age: 74
Discharge: HOME | End: 2024-08-14
Payer: MEDICARE

## 2024-08-14 DIAGNOSIS — S82.892A CLOSED LEFT ANKLE FRACTURE, INITIAL ENCOUNTER: Primary | ICD-10-CM

## 2024-08-14 DIAGNOSIS — S82.892A CLOSED LEFT ANKLE FRACTURE, INITIAL ENCOUNTER: ICD-10-CM

## 2024-08-14 DIAGNOSIS — J44.9 CHRONIC OBSTRUCTIVE PULMONARY DISEASE, UNSPECIFIED COPD TYPE (MULTI): ICD-10-CM

## 2024-08-14 DIAGNOSIS — I50.9 CONGESTIVE HEART FAILURE, UNSPECIFIED HF CHRONICITY, UNSPECIFIED HEART FAILURE TYPE (MULTI): ICD-10-CM

## 2024-08-14 DIAGNOSIS — D64.9 ANEMIA, UNSPECIFIED TYPE: ICD-10-CM

## 2024-08-14 DIAGNOSIS — I10 PRIMARY HYPERTENSION: Primary | ICD-10-CM

## 2024-08-14 PROCEDURE — 73590 X-RAY EXAM OF LOWER LEG: CPT | Mod: LT

## 2024-08-14 PROCEDURE — 99308 SBSQ NF CARE LOW MDM 20: CPT | Performed by: REGISTERED NURSE

## 2024-08-14 PROCEDURE — 1111F DSCHRG MED/CURRENT MED MERGE: CPT | Performed by: PHYSICIAN ASSISTANT

## 2024-08-14 PROCEDURE — 1160F RVW MEDS BY RX/DR IN RCRD: CPT | Performed by: PHYSICIAN ASSISTANT

## 2024-08-14 PROCEDURE — 99211 OFF/OP EST MAY X REQ PHY/QHP: CPT | Performed by: PHYSICIAN ASSISTANT

## 2024-08-14 PROCEDURE — 73610 X-RAY EXAM OF ANKLE: CPT | Mod: LT

## 2024-08-14 PROCEDURE — L4361 PNEUMA/VAC WALK BOOT PRE OTS: HCPCS | Performed by: PHYSICIAN ASSISTANT

## 2024-08-14 PROCEDURE — 1159F MED LIST DOCD IN RCRD: CPT | Performed by: PHYSICIAN ASSISTANT

## 2024-08-14 PROCEDURE — 73610 X-RAY EXAM OF ANKLE: CPT | Mod: LEFT SIDE | Performed by: RADIOLOGY

## 2024-08-14 PROCEDURE — 73590 X-RAY EXAM OF LOWER LEG: CPT | Mod: LEFT SIDE | Performed by: RADIOLOGY

## 2024-08-14 PROCEDURE — 1036F TOBACCO NON-USER: CPT | Performed by: PHYSICIAN ASSISTANT

## 2024-08-14 NOTE — LETTER
Patient: Renee Lombardo  : 1950    Encounter Date: 2024    PROGRESS NOTE    Subjective  Chief complaint: Renee Lombardo is a 73 y.o. female who is an acute skilled patient being seen and evaluated for weakness    HPI:  24 Patient has no new complaints or concerns today.  Continues working towards goals in therapy.  Denies constitutional symptoms.    24 Patient presents for f/u therapy and general medical care.  Patient seen and examined at [facility].  Therapy has been working with the patient to improve strength, endurance, ADLs, and transfers d/t generalized weakness.  Patient has no acute concerns today.    24 Patient has been working in therapy to improve strength, endurance, and ADLs.  Patient continues to work toward goals.  No new concerns today.  Denies n/v/f/c pain.      24 Therapy has been working with the patient to improve strength and endurance with ADLs, transfers, and mobility.  Patient continues to work toward goals.  Patient is stable and has no new complaints.  Nursing staff voices no new concerns today.    24 Patient has no new complaints or concerns today.  Patient is working in therapy.  Denies n/v/f/c.    Objective  Vital signs: 118/70, 97.9, 80, 18, 95%    Physical Exam  Constitutional:       General: She is not in acute distress.  Eyes:      Extraocular Movements: Extraocular movements intact.   Pulmonary:      Effort: Pulmonary effort is normal.   Musculoskeletal:      Cervical back: Neck supple.   Neurological:      Mental Status: She is alert.   Psychiatric:         Mood and Affect: Mood normal.         Behavior: Behavior is cooperative.         Assessment/Plan  Problem List Items Addressed This Visit       Chronic obstructive pulmonary disease (Multi)     Stable   Continue Albuterol- budesonide, Trelegy         CHF (congestive heart failure) (Multi)      no SOB, Rales, edema   control BP   EMILIANO diet   monitor weights   Lasix            HTN (hypertension) -  Primary      BP within goal   Continue Lasix T, TH, SA, PRICE   routine BMP   monitor BP            Anemia     Monitor hgb           Medications, treatments, and labs reviewed  Continue medications and treatments as listed in PCC    Scribe Attestation  I, Mildred Glasgow   attest that this documentation has been prepared under the direction and in the presence of CARLOS ALBERTO Hughes.    Provider Attestation - Scribe documentation  All medical record entries made by the Scribe were at my direction and personally dictated by me. I have reviewed the chart and agree that the record accurately reflects my personal performance of the history, physical exam, discussion and plan.    CARLOS ALBERTO Hughes        Electronically Signed By: CARLOS ALBERTO Hughes   8/21/24  8:40 PM

## 2024-08-14 NOTE — PATIENT INSTRUCTIONS
Can advance to weight bearing as tolerated in boot. OK to remove boot for gentle range of motion of the foot; pt will be unable to do ankle range of motion due to nature of ankle fusion surgery.     OK to remove boot for rest/sleep but needs boot on any time she is weight bearing.    OK to shower and wash incision. No lotions, cream, or tub soaks.    I am allowing patient the ability to leave the facility to travel home for items and then return to the facility. Allow her off campus privileges.     Follow up 6 weeks.     Ayaka Alexander PA-C   [phone]

## 2024-08-15 ENCOUNTER — NURSING HOME VISIT (OUTPATIENT)
Dept: POST ACUTE CARE | Facility: EXTERNAL LOCATION | Age: 74
End: 2024-08-15
Payer: MEDICARE

## 2024-08-15 DIAGNOSIS — S82.892A CLOSED LEFT ANKLE FRACTURE, INITIAL ENCOUNTER: ICD-10-CM

## 2024-08-15 DIAGNOSIS — J44.9 CHRONIC OBSTRUCTIVE PULMONARY DISEASE, UNSPECIFIED COPD TYPE (MULTI): Primary | ICD-10-CM

## 2024-08-15 DIAGNOSIS — I50.9 CONGESTIVE HEART FAILURE, UNSPECIFIED HF CHRONICITY, UNSPECIFIED HEART FAILURE TYPE (MULTI): ICD-10-CM

## 2024-08-15 DIAGNOSIS — I10 PRIMARY HYPERTENSION: ICD-10-CM

## 2024-08-15 PROCEDURE — 99309 SBSQ NF CARE MODERATE MDM 30: CPT

## 2024-08-15 NOTE — LETTER
Patient: Renee Lombardo  : 1950    Encounter Date: 08/15/2024    PROGRESS NOTE    Subjective  Chief complaint: Renee Lombardo is a 73 y.o. female who is an acute skilled patient being seen and evaluated for weakness    HPI:  24 Patient has no new complaints or concerns today.  Continues working towards goals in therapy.  Denies constitutional symptoms.    24 Patient presents for f/u therapy and general medical care.  Patient seen and examined at [facility].  Therapy has been working with the patient to improve strength, endurance, ADLs, and transfers d/t generalized weakness.  Patient has no acute concerns today.    24 Patient has been working in therapy to improve strength, endurance, and ADLs.  Patient continues to work toward goals.  No new concerns today.  Denies n/v/f/c pain.      24 Therapy has been working with the patient to improve strength and endurance with ADLs, transfers, and mobility.  Patient continues to work toward goals.  Patient is stable and has no new complaints.  Nursing staff voices no new concerns today.    24 Patient has no new complaints or concerns today.  Patient is working in therapy.  Denies n/v/f/c.    8/15/24  Patient has no acute complaints.  No new issues per nursing.  Continues working in therapy. Sutures intact.  WBAT with walking boot.          Objective  Vital signs: .  116/71, 98.2, 83, 18, 95%    Physical Exam  Constitutional:       General: She is not in acute distress.     Appearance: Normal appearance.   HENT:      Head: Normocephalic.      Mouth/Throat:      Mouth: Mucous membranes are moist.   Eyes:      Extraocular Movements: Extraocular movements intact.   Cardiovascular:      Rate and Rhythm: Normal rate and regular rhythm.      Pulses: Normal pulses.      Heart sounds: Normal heart sounds.   Pulmonary:      Effort: Pulmonary effort is normal.      Breath sounds: Wheezing present.   Abdominal:      General: Bowel sounds are normal.       Palpations: Abdomen is soft.   Musculoskeletal:         General: Normal range of motion.      Cervical back: Normal range of motion and neck supple.      Comments:  LLE-  Sutures intact- Walking boot-MSP intact   Skin:     General: Skin is warm and dry.      Capillary Refill: Capillary refill takes less than 2 seconds.   Neurological:      Mental Status: She is alert and oriented to person, place, and time.   Psychiatric:         Mood and Affect: Mood normal.         Behavior: Behavior normal. Behavior is cooperative.         Assessment/Plan  Problem List Items Addressed This Visit       Closed left ankle fracture, initial encounter      LLE-  sutures intact- WBAT- walking boot   MSP intact   Pain management   Elevation   Therapy   Follow-up with Ortho 8/16         Chronic obstructive pulmonary disease (Multi) - Primary      Stable   Continue Albuterol- budesonide, Trelegy   maintain sats> 88%            CHF (congestive heart failure) (Multi)      no SOB, Rales, edema   control BP   EMILIANO diet   monitor weights   Lasix            HTN (hypertension)      BP within goal   Continue Lasix T, TH, SA, PRICE   routine BMP   monitor BP             Medications, treatments, and labs reviewed  Continue medications and treatments as listed in EMR    CARLOS ALBERTO Ramirez      Electronically Signed By: CARLOS ALBERTO Ramirez   8/20/24  8:53 PM

## 2024-08-15 NOTE — PROGRESS NOTES
History of Present Illness   Renee Lombardo is a 73 y.o. female presenting today for post-op check from l ankle hind foot fusion on 7/22/24. Overall doing ok. Has mild pain that is requiring narcotics for pain control. Rates pain a 6/10. Incisions are well healing without redness or drainage. Compliant with WB recommendations. Denies numbness, tingling, f/c, CP, SOB or any other complaints or concerns.      Past Medical History:   Diagnosis Date    COPD (chronic obstructive pulmonary disease) (Multi)     Disease of thyroid gland     GERD (gastroesophageal reflux disease)     Hypothyroidism        Medication Documentation Review Audit       Reviewed by LOURDES Banegas-CRNA (Nurse Anesthetist) on 07/22/24 at 0639      Medication Order Taking? Sig Documenting Provider Last Dose Status   acetaminophen (Tylenol) 500 mg tablet [id number] No Take 2 tablets (1,000 mg) by mouth every 6 hours if needed for mild pain (1 - 3) or moderate pain (4 - 6). Ignacio Qiu MD Unknown Active   albuterol 90 mcg/actuation inhaler [id number] Yes Inhale 2 puffs every 4 hours if needed for wheezing or shortness of breath. Historical Provider, MD  Active   alum-mag hydroxide-simeth (Maalox MAX) 400-400-40 mg/5 mL suspension [id number] No Take 5 mL by mouth if needed for indigestion or heartburn. Ignacio Qiu MD Unknown Active   docusate sodium (Colace) 100 mg capsule [id number] No Take 1 capsule (100 mg) by mouth 2 times a day. Ignacio Qiu MD Unknown Active   furosemide (Lasix) 40 mg tablet [id number] No Take 1 tablet (40 mg) by mouth once daily on Sunday, Tuesday, Thursday and Saturday Ignacio Qiu MD Unknown Active   ipratropium-albuteroL (Duo-Neb) 0.5-2.5 mg/3 mL nebulizer solution [id number] Yes Take 3 mL by nebulization. Every 4 to 6 hours as needed Ignacio Qiu MD  Active   levothyroxine (Synthroid, Levoxyl) 100 mcg tablet [id number] Yes Take 1 tablet (100 mcg) by mouth early in the morning.. Take  on an empty stomach at the same time each day, either 30 to 60 minutes prior to breakfast Ignacio Provider, MD 2024 Active   potassium chloride (Klor-Con) 20 mEq packet [id number] No Take 1 packet (20 mEq) by mouth twice daily on , Tuesday, Thursday and Saturday Historical Provider, MD Unknown Active   sodium chloride (Ocean Nasal) 0.65 % nasal spray [id number] Yes Administer 2 sprays into each nostril if needed (congestion or nasal dryness). Ignacio Provider, MD  Active   sucralfate (Carafate) 1 gram tablet [id number] No Take 1 tablet (1 g) by mouth 2 times a day. Historical Provider, MD Unknown Active   Trelegy Ellipta 200-62.5-25 mcg blister with device [id number]  Inhale 1 puff once daily. Historical Provider, MD  Active                    Allergies   Allergen Reactions    Ceftriaxone Unknown    Iodinated Contrast Media Unknown    Sulfamide Unknown     SULFA DRUGS       Social History     Socioeconomic History    Marital status:      Spouse name: Not on file    Number of children: Not on file    Years of education: Not on file    Highest education level: Not on file   Occupational History    Not on file   Tobacco Use    Smoking status: Former     Types: Cigarettes     Start date:      Quit date: 1970     Years since quittin.6    Smokeless tobacco: Never   Substance and Sexual Activity    Alcohol use: Yes     Comment: SOCIALLY    Drug use: Never    Sexual activity: Defer   Other Topics Concern    Not on file   Social History Narrative    Not on file     Social Determinants of Health     Financial Resource Strain: Low Risk  (2024)    Overall Financial Resource Strain (CARDIA)     Difficulty of Paying Living Expenses: Not hard at all   Food Insecurity: Not on file   Transportation Needs: No Transportation Needs (2024)    PRAPARE - Transportation     Lack of Transportation (Medical): No     Lack of Transportation (Non-Medical): No   Physical Activity: Not on file   Stress: Not  on file   Social Connections: Not on file   Intimate Partner Violence: Not on file   Housing Stability: Unknown (7/20/2024)    Housing Stability Vital Sign     Unable to Pay for Housing in the Last Year: Patient declined     Number of Times Moved in the Last Year: 1     Homeless in the Last Year: No       Past Surgical History:   Procedure Laterality Date    OTHER SURGICAL HISTORY  03/23/2015    Percutaneous Vertebral Augmentation Kyphoplasty          Review of Systems:  30 point ROS reviewed and negative other than as listed in the HPI     Physical Exam:  Gen: The pt is A&Ox3, NAD, and appear state age and weight  Psychiatric: mood and affect are appropriate   Eyes: sclera are white, EOM grossly intact  ENT: MMM  Neck: supple, thyroid is midline  Respiratory: respirations are nonlabored, chest rise symmetric  CV: rate is regular by palpation of distal pulses  Abdomen: nondistended   Integument: no obvious cutaneous lesions noted. No signs of lymphangitis. No signs of systemic edema.   MSK: left ankle surgical incision well healing without edema, erythema, drainage, or other s/sx of infection. Appropriately tender to palpation around the incision. SILT throughout the leg intact. Intact plantarflexion and dorsiflexion. Foot warm and well perfused.      Imaging:  I personally reviewed multiple views of the  left ankle and tib/fib  were obtained in the office today demonstrate maintenance of reduction, interval healing, and a stable position of the hardware.      Assessment   73 y.o. female post-op from L hind foot fusion on 7/22/24.     Plan:  Can advance to WBAT in boot which was fitted in the office today  Incisions well healing; sutures/staples removed in office and steri's placed with wound care    Continue DVT ppx and vit d/calcium for bone health    Patient was prescribed a CAM boot for left ankle injury. This mobility device is required for the following reasons:     1. The patient has a mobility limitation  that significantly impairs their ability to participate in one or more mobility-related activities of daily living (MRADL) in the home; and  2. The patient is able to safely use the mobility device; and  3. The functional mobility deficit can be sufficiently resolved with use of the mobility device     Verbal and written instructions for the use, wear schedule, cleaning and application of this item were given. Education provided also included gait training and safety precautions when using this device. Patient was instructed that should the item result in increased pain, decreased sensation, increased swelling, or an overall worsening of their medical condition, to please contact our office immediately.      Follow-up 1 month with 2 views left tib/fib, 3 views left ankle.     All of the patient's questions/concerns address and they are in agreement with the plan.

## 2024-08-16 NOTE — PROGRESS NOTES
PROGRESS NOTE    Subjective   Chief complaint: Renee Lombardo is a 73 y.o. female who is an acute skilled patient being seen and evaluated for weakness    HPI:  8/5/24 Patient has no new complaints or concerns today.  Continues working towards goals in therapy.  Denies constitutional symptoms.    8/6/24 Patient presents for f/u therapy and general medical care.  Patient seen and examined at [facility].  Therapy has been working with the patient to improve strength, endurance, ADLs, and transfers d/t generalized weakness.  Patient has no acute concerns today.    8/12/24 Patient has been working in therapy to improve strength, endurance, and ADLs.  Patient continues to work toward goals.  No new concerns today.  Denies n/v/f/c pain.      8/13/24 Therapy has been working with the patient to improve strength and endurance with ADLs, transfers, and mobility.  Patient continues to work toward goals.  Patient is stable and has no new complaints.  Nursing staff voices no new concerns today.    8/14/24 Patient has no new complaints or concerns today.  Patient is working in therapy.  Denies n/v/f/c.    Objective   Vital signs: 118/70, 97.9, 80, 18, 95%    Physical Exam  Constitutional:       General: She is not in acute distress.  Eyes:      Extraocular Movements: Extraocular movements intact.   Pulmonary:      Effort: Pulmonary effort is normal.   Musculoskeletal:      Cervical back: Neck supple.   Neurological:      Mental Status: She is alert.   Psychiatric:         Mood and Affect: Mood normal.         Behavior: Behavior is cooperative.         Assessment/Plan   Problem List Items Addressed This Visit       Chronic obstructive pulmonary disease (Multi)     Stable   Continue Albuterol- budesonide, Trelegy         CHF (congestive heart failure) (Multi)      no SOB, Rales, edema   control BP   EMILIANO diet   monitor weights   Lasix            HTN (hypertension) - Primary      BP within goal   Continue Lasix T, TH, SA, PRICE   routine  BMP   monitor BP            Anemia     Monitor hgb           Medications, treatments, and labs reviewed  Continue medications and treatments as listed in PCC    Scribe Attestation  I, Mildred Glasgow   attest that this documentation has been prepared under the direction and in the presence of CARLOS ALBERTO Hguhes.    Provider Attestation - Scribe documentation  All medical record entries made by the Scribe were at my direction and personally dictated by me. I have reviewed the chart and agree that the record accurately reflects my personal performance of the history, physical exam, discussion and plan.    CARLOS ALBERTO Hughes

## 2024-08-16 NOTE — PROGRESS NOTES
PROGRESS NOTE    Subjective   Chief complaint: Renee Lombardo is a 73 y.o. female who is an acute skilled patient being seen and evaluated for weakness    HPI:  8/5/24 Patient has no new complaints or concerns today.  Continues working towards goals in therapy.  Denies constitutional symptoms.    8/6/24 Patient presents for f/u therapy and general medical care.  Patient seen and examined at [facility].  Therapy has been working with the patient to improve strength, endurance, ADLs, and transfers d/t generalized weakness.  Patient has no acute concerns today.    8/12/24 Patient has been working in therapy to improve strength, endurance, and ADLs.  Patient continues to work toward goals.  No new concerns today.  Denies n/v/f/c pain.      8/13/24 Therapy has been working with the patient to improve strength and endurance with ADLs, transfers, and mobility.  Patient continues to work toward goals.  Patient is stable and has no new complaints.  Nursing staff voices no new concerns today.    Objective   Vital signs: 118/70, 97.9, 80, 18, 95%    Physical Exam  Constitutional:       General: She is not in acute distress.  Eyes:      Extraocular Movements: Extraocular movements intact.   Pulmonary:      Effort: Pulmonary effort is normal.   Musculoskeletal:      Cervical back: Neck supple.   Neurological:      Mental Status: She is alert.   Psychiatric:         Mood and Affect: Mood normal.         Behavior: Behavior is cooperative.         Assessment/Plan   Problem List Items Addressed This Visit       CHF (congestive heart failure) (Multi)      no SOB, Rales, edema   control BP   EMILIANO diet   monitor weights   Lasix            Gastroesophageal reflux disease      no complaints   reinforced sitting up after eating   Carafate            HTN (hypertension) - Primary      BP within goal   Continue Lasix T, TH, SA, PRICE   routine BMP   monitor BP             Medications, treatments, and labs reviewed  Continue medications and  treatments as listed in [facility]    Scribe Attestation  I, Mildred Glasgow   attest that this documentation has been prepared under the direction and in the presence of CARLOS ALBERTO Hughes.    Provider Attestation - Scribe documentation  All medical record entries made by the Scribe were at my direction and personally dictated by me. I have reviewed the chart and agree that the record accurately reflects my personal performance of the history, physical exam, discussion and plan.    CARLOS ALBERTO Hughes

## 2024-08-19 ENCOUNTER — NURSING HOME VISIT (OUTPATIENT)
Dept: POST ACUTE CARE | Facility: EXTERNAL LOCATION | Age: 74
End: 2024-08-19
Payer: MEDICARE

## 2024-08-19 DIAGNOSIS — I10 PRIMARY HYPERTENSION: ICD-10-CM

## 2024-08-19 DIAGNOSIS — A49.8 E COLI INFECTION: Primary | ICD-10-CM

## 2024-08-19 DIAGNOSIS — J44.9 CHRONIC OBSTRUCTIVE PULMONARY DISEASE, UNSPECIFIED COPD TYPE (MULTI): ICD-10-CM

## 2024-08-19 PROCEDURE — 99309 SBSQ NF CARE MODERATE MDM 30: CPT | Performed by: INTERNAL MEDICINE

## 2024-08-19 NOTE — LETTER
Patient: Renee Lombardo  : 1950    Encounter Date: 2024    PROGRESS NOTE    Subjective  Chief complaint: Renee Lombardo is a 73 y.o. female who is an acute skilled patient being seen and evaluated for weakness    HPI:  24 Patient has no new complaints or concerns today.  Continues working towards goals in therapy.  Denies constitutional symptoms.    24 Patient presents for f/u therapy and general medical care.  Patient seen and examined at [facility].  Therapy has been working with the patient to improve strength, endurance, ADLs, and transfers d/t generalized weakness.  Patient has no acute concerns today.    24 Patient has been working in therapy to improve strength, endurance, and ADLs.  Patient continues to work toward goals.  No new concerns today.  Denies n/v/f/c pain.      24 Therapy has been working with the patient to improve strength and endurance with ADLs, transfers, and mobility.  Patient continues to work toward goals.  Patient is stable and has no new complaints.  Nursing staff voices no new concerns today.    24 Patient has no new complaints or concerns today.  Patient is working in therapy.  Denies n/v/f/c.    8/15/24  Patient has no acute complaints.  No new issues per nursing.  Continues working in therapy. Sutures intact.  WBAT with walking boot.    2024 Patient in therapy d/t generalized weakness.  Patient presents for f/u.  Continues to work toward goals in therapy.  No new complaints at this time.          Objective  Vital signs: 124/68, 97.6, 74, 18, 97%    Physical Exam  Constitutional:       General: She is not in acute distress.  Eyes:      Extraocular Movements: Extraocular movements intact.   Cardiovascular:      Rate and Rhythm: Normal rate and regular rhythm.   Pulmonary:      Effort: Pulmonary effort is normal.      Breath sounds: Normal breath sounds.   Abdominal:      General: Bowel sounds are normal.      Palpations: Abdomen is soft.    Musculoskeletal:      Cervical back: Neck supple.      Right lower leg: No edema.      Left lower leg: No edema.   Neurological:      Mental Status: She is alert.      Motor: Weakness present.   Psychiatric:         Mood and Affect: Mood normal.         Assessment/Plan  Problem List Items Addressed This Visit       Chronic obstructive pulmonary disease (Multi)     Continue current medications   Schedule duo nebs TID         HTN (hypertension)      Continue Lasix T, TH, SA, PRICE   routine BMP   monitor BP            E coli infection - Primary     Cipro  Monitor        Start Cipro for UTI, patient has grown pansensitive E. coli in the past    Medications, treatments, and labs reviewed  Continue medications and treatments as listed in EMR      Scribe Attestation  I, Mildred Salgado   attest that this documentation has been prepared under the direction and in the presence of Marco A Jim DO    Provider Attestation - Scribe documentation  All medical record entries made by the Scribe were at my direction and personally dictated by me. I have reviewed the chart and agree that the record accurately reflects my personal performance of the history, physical exam, discussion and plan.   Marco A Jim DO        Electronically Signed By: Marco A Jim DO   12/28/24  9:46 PM

## 2024-08-20 ENCOUNTER — NURSING HOME VISIT (OUTPATIENT)
Dept: POST ACUTE CARE | Facility: EXTERNAL LOCATION | Age: 74
End: 2024-08-20
Payer: MEDICARE

## 2024-08-20 DIAGNOSIS — I10 PRIMARY HYPERTENSION: Primary | ICD-10-CM

## 2024-08-20 DIAGNOSIS — I50.9 CONGESTIVE HEART FAILURE, UNSPECIFIED HF CHRONICITY, UNSPECIFIED HEART FAILURE TYPE (MULTI): ICD-10-CM

## 2024-08-20 DIAGNOSIS — J44.9 CHRONIC OBSTRUCTIVE PULMONARY DISEASE, UNSPECIFIED COPD TYPE (MULTI): ICD-10-CM

## 2024-08-20 PROCEDURE — 99308 SBSQ NF CARE LOW MDM 20: CPT | Performed by: REGISTERED NURSE

## 2024-08-20 NOTE — LETTER
Patient: Renee Lombardo  : 1950    Encounter Date: 2024    PROGRESS NOTE    Subjective  Chief complaint: Renee Lombardo is a 73 y.o. female who is an acute skilled patient being seen and evaluated for weakness    HPI:  24 Patient has no new complaints or concerns today.  Continues working towards goals in therapy.  Denies constitutional symptoms.    Objective  Vital signs: 116/78, 98.1, 64, 17, 96%    Physical Exam  Constitutional:       General: She is not in acute distress.  Eyes:      Extraocular Movements: Extraocular movements intact.   Pulmonary:      Effort: Pulmonary effort is normal.   Musculoskeletal:      Cervical back: Neck supple.   Neurological:      Mental Status: She is alert.   Psychiatric:         Mood and Affect: Mood normal.         Behavior: Behavior is cooperative.         Assessment/Plan  Problem List Items Addressed This Visit       Chronic obstructive pulmonary disease (Multi)     Stable   Continue Albuterol- budesonide, Trelegy         CHF (congestive heart failure) (Multi)      no SOB, Rales, edema   control BP   EMILIANO diet   monitor weights   Lasix            HTN (hypertension) - Primary      BP within goal   Continue Lasix T, TH, SA, PRICE   routine BMP   monitor BP             Medications, treatments, and labs reviewed  Continue medications and treatments as listed in [facility]    Scribe Attestation  I, Mildred Glasgow   attest that this documentation has been prepared under the direction and in the presence of CARLOS ALBERTO Hughes.    Provider Attestation - Scribe documentation  All medical record entries made by the Scribe were at my direction and personally dictated by me. I have reviewed the chart and agree that the record accurately reflects my personal performance of the history, physical exam, discussion and plan.    CARLOS ALBERTO Hughes        Electronically Signed By: CARLOS ALBERTO Hughes   24  8:55 PM

## 2024-08-21 ENCOUNTER — NURSING HOME VISIT (OUTPATIENT)
Dept: POST ACUTE CARE | Facility: EXTERNAL LOCATION | Age: 74
End: 2024-08-21
Payer: MEDICARE

## 2024-08-21 DIAGNOSIS — I10 PRIMARY HYPERTENSION: Primary | ICD-10-CM

## 2024-08-21 DIAGNOSIS — J44.9 CHRONIC OBSTRUCTIVE PULMONARY DISEASE, UNSPECIFIED COPD TYPE (MULTI): ICD-10-CM

## 2024-08-21 DIAGNOSIS — K21.9 GASTROESOPHAGEAL REFLUX DISEASE WITHOUT ESOPHAGITIS: ICD-10-CM

## 2024-08-21 DIAGNOSIS — I50.9 CONGESTIVE HEART FAILURE, UNSPECIFIED HF CHRONICITY, UNSPECIFIED HEART FAILURE TYPE (MULTI): ICD-10-CM

## 2024-08-21 PROCEDURE — 99308 SBSQ NF CARE LOW MDM 20: CPT | Performed by: REGISTERED NURSE

## 2024-08-21 NOTE — ASSESSMENT & PLAN NOTE
LLE-  sutures intact- WBAT- walking boot   MSP intact   Pain management   Elevation   Therapy   Follow-up with Ortho 8/16

## 2024-08-21 NOTE — PROGRESS NOTES
PROGRESS NOTE    Subjective   Chief complaint: Renee Lombardo is a 73 y.o. female who is an acute skilled patient being seen and evaluated for weakness    HPI:  8/5/24 Patient has no new complaints or concerns today.  Continues working towards goals in therapy.  Denies constitutional symptoms.    8/6/24 Patient presents for f/u therapy and general medical care.  Patient seen and examined at beside.  Therapy has been working with the patient to improve strength, endurance, ADLs, and transfers d/t generalized weakness.  Patient has no acute concerns today.    8/12/24 Patient has been working in therapy to improve strength, endurance, and ADLs.  Patient continues to work toward goals.  No new concerns today.  Denies n/v/f/c pain.      8/13/24 Therapy has been working with the patient to improve strength and endurance with ADLs, transfers, and mobility.  Patient continues to work toward goals.  Patient is stable and has no new complaints.  Nursing staff voices no new concerns today.    8/14/24 Patient has no new complaints or concerns today.  Patient is working in therapy.  Denies n/v/f/c.    8/15/24  Patient has no acute complaints.  No new issues per nursing.  Continues working in therapy. Sutures intact.  WBAT with walking boot.          Objective   Vital signs: .  116/71, 98.2, 83, 18, 95%    Physical Exam  Constitutional:       General: She is not in acute distress.     Appearance: Normal appearance.   HENT:      Head: Normocephalic.      Mouth/Throat:      Mouth: Mucous membranes are moist.   Eyes:      Extraocular Movements: Extraocular movements intact.   Cardiovascular:      Rate and Rhythm: Normal rate and regular rhythm.      Pulses: Normal pulses.      Heart sounds: Normal heart sounds.   Pulmonary:      Effort: Pulmonary effort is normal.      Breath sounds: Wheezing present.   Abdominal:      General: Bowel sounds are normal.      Palpations: Abdomen is soft.   Musculoskeletal:         General: Normal  range of motion.      Cervical back: Normal range of motion and neck supple.      Comments:  LLE-  Sutures intact- Walking boot-MSP intact   Skin:     General: Skin is warm and dry.      Capillary Refill: Capillary refill takes less than 2 seconds.   Neurological:      Mental Status: She is alert and oriented to person, place, and time.   Psychiatric:         Mood and Affect: Mood normal.         Behavior: Behavior normal. Behavior is cooperative.         Assessment/Plan   Problem List Items Addressed This Visit       Closed left ankle fracture, initial encounter      LLE-  sutures intact- WBAT- walking boot   MSP intact   Pain management   Elevation   Therapy   Follow-up with Ortho 8/16         Chronic obstructive pulmonary disease (Multi) - Primary      Stable   Continue Albuterol- budesonide, Trelegy   maintain sats> 88%            CHF (congestive heart failure) (Multi)      no SOB, Rales, edema   control BP   EMILIANO diet   monitor weights   Lasix            HTN (hypertension)      BP within goal   Continue Lasix T, TH, SA, PRICE   routine BMP   monitor BP             Medications, treatments, and labs reviewed  Continue medications and treatments as listed in EMR    LOURDES Ramirez-CNP

## 2024-08-21 NOTE — LETTER
Patient: Renee Lombardo  : 1950    Encounter Date: 2024    PROGRESS NOTE    Subjective  Chief complaint: Renee Lombardo is a 73 y.o. female who is an acute skilled patient being seen and evaluated for weakness    HPI:  24 Patient has no new complaints or concerns today.  Continues working towards goals in therapy.  Denies constitutional symptoms.    24 Therapy has been working with the patient to improve strength and endurance with ADLs, transfers, and mobility.  Patient continues to work toward goals.  Patient is stable and has no new complaints.  Nursing staff voices no new concerns today.    Objective  Vital signs: 116/78, 98.1, 64, 17, 96%    Physical Exam  Constitutional:       General: She is not in acute distress.  Eyes:      Extraocular Movements: Extraocular movements intact.   Pulmonary:      Effort: Pulmonary effort is normal.   Musculoskeletal:      Cervical back: Neck supple.   Neurological:      Mental Status: She is alert.   Psychiatric:         Mood and Affect: Mood normal.         Behavior: Behavior is cooperative.         Assessment/Plan  Problem List Items Addressed This Visit       Chronic obstructive pulmonary disease (Multi)     Stable   Continue Albuterol- budesonide, Trelegy         CHF (congestive heart failure) (Multi)      no SOB, Rales, edema   control BP   EMILIANO diet   monitor weights   Lasix            Gastroesophageal reflux disease      no complaints   reinforced sitting up after eating   Carafate            HTN (hypertension) - Primary      BP within goal   Continue Lasix T, TH, SA, PRICE   routine BMP   monitor BP             Medications, treatments, and labs reviewed  Continue medications and treatments as listed in [facility]    Scribe Attestation  I, Mildred Glasgow   attest that this documentation has been prepared under the direction and in the presence of CARLOS ALBERTO Hughes.    Provider Attestation - Scribe documentation  All medical record  entries made by the Scribe were at my direction and personally dictated by me. I have reviewed the chart and agree that the record accurately reflects my personal performance of the history, physical exam, discussion and plan.    CARLOS ALBERTO Hughes        Electronically Signed By: CARLOS ALBERTO Hughes   8/28/24  9:19 PM

## 2024-08-22 NOTE — PROGRESS NOTES
PROGRESS NOTE    Subjective   Chief complaint: Renee Lombardo is a 73 y.o. female who is an acute skilled patient being seen and evaluated for weakness    HPI:  8/20/24 Patient has no new complaints or concerns today.  Continues working towards goals in therapy.  Denies constitutional symptoms.    Objective   Vital signs: 116/78, 98.1, 64, 17, 96%    Physical Exam  Constitutional:       General: She is not in acute distress.  Eyes:      Extraocular Movements: Extraocular movements intact.   Pulmonary:      Effort: Pulmonary effort is normal.   Musculoskeletal:      Cervical back: Neck supple.   Neurological:      Mental Status: She is alert.   Psychiatric:         Mood and Affect: Mood normal.         Behavior: Behavior is cooperative.         Assessment/Plan   Problem List Items Addressed This Visit       Chronic obstructive pulmonary disease (Multi)     Stable   Continue Albuterol- budesonide, Trelegy         CHF (congestive heart failure) (Multi)      no SOB, Rales, edema   control BP   EMILIANO diet   monitor weights   Lasix            HTN (hypertension) - Primary      BP within goal   Continue Lasix T, TH, SA, PRICE   routine BMP   monitor BP             Medications, treatments, and labs reviewed  Continue medications and treatments as listed in [facility]    Scribe Attestation  I, Mildred Glasgow   attest that this documentation has been prepared under the direction and in the presence of CARLOS ALBERTO Hughes.    Provider Attestation - Scribe documentation  All medical record entries made by the Scribe were at my direction and personally dictated by me. I have reviewed the chart and agree that the record accurately reflects my personal performance of the history, physical exam, discussion and plan.    CARLOS ALBERTO Hughes

## 2024-08-22 NOTE — PROGRESS NOTES
PROGRESS NOTE    Subjective   Chief complaint: Renee Lombardo is a 73 y.o. female who is an acute skilled patient being seen and evaluated for weakness    HPI:  8/20/24 Patient has no new complaints or concerns today.  Continues working towards goals in therapy.  Denies constitutional symptoms.    8/21/24 Therapy has been working with the patient to improve strength and endurance with ADLs, transfers, and mobility.  Patient continues to work toward goals.  Patient is stable and has no new complaints.  Nursing staff voices no new concerns today.    Objective   Vital signs: 116/78, 98.1, 64, 17, 96%    Physical Exam  Constitutional:       General: She is not in acute distress.  Eyes:      Extraocular Movements: Extraocular movements intact.   Pulmonary:      Effort: Pulmonary effort is normal.   Musculoskeletal:      Cervical back: Neck supple.   Neurological:      Mental Status: She is alert.   Psychiatric:         Mood and Affect: Mood normal.         Behavior: Behavior is cooperative.         Assessment/Plan   Problem List Items Addressed This Visit       Chronic obstructive pulmonary disease (Multi)     Stable   Continue Albuterol- budesonide, Trelegy         CHF (congestive heart failure) (Multi)      no SOB, Rales, edema   control BP   EMILIANO diet   monitor weights   Lasix            Gastroesophageal reflux disease      no complaints   reinforced sitting up after eating   Carafate            HTN (hypertension) - Primary      BP within goal   Continue Lasix T, TH, SA, PRICE   routine BMP   monitor BP             Medications, treatments, and labs reviewed  Continue medications and treatments as listed in P[facility]    Scribe Attestation  I, Mildred Glasgow   attest that this documentation has been prepared under the direction and in the presence of CARLOS ALBERTO Hughes.    Provider Attestation - Scribe documentation  All medical record entries made by the Scribe were at my direction and personally dictated by  me. I have reviewed the chart and agree that the record accurately reflects my personal performance of the history, physical exam, discussion and plan.    Marco A Hdez, APRN-CNP

## 2024-08-23 ENCOUNTER — NURSING HOME VISIT (OUTPATIENT)
Dept: POST ACUTE CARE | Facility: EXTERNAL LOCATION | Age: 74
End: 2024-08-23
Payer: MEDICARE

## 2024-08-23 DIAGNOSIS — I50.9 CONGESTIVE HEART FAILURE, UNSPECIFIED HF CHRONICITY, UNSPECIFIED HEART FAILURE TYPE (MULTI): ICD-10-CM

## 2024-08-23 DIAGNOSIS — K21.9 GASTROESOPHAGEAL REFLUX DISEASE WITHOUT ESOPHAGITIS: ICD-10-CM

## 2024-08-23 DIAGNOSIS — D64.9 ANEMIA, UNSPECIFIED TYPE: Primary | ICD-10-CM

## 2024-08-23 DIAGNOSIS — J44.9 CHRONIC OBSTRUCTIVE PULMONARY DISEASE, UNSPECIFIED COPD TYPE (MULTI): ICD-10-CM

## 2024-08-23 DIAGNOSIS — I10 PRIMARY HYPERTENSION: ICD-10-CM

## 2024-08-23 PROCEDURE — 99308 SBSQ NF CARE LOW MDM 20: CPT | Performed by: INTERNAL MEDICINE

## 2024-08-23 NOTE — LETTER
Patient: Renee Lombardo  : 1950    Encounter Date: 2024    PROGRESS NOTE    Subjective  Chief complaint: Renee Lombardo is a 73 y.o. female who is an acute skilled patient being seen and evaluated for weakness    HPI:  24 Patient seen and examined at [facility].  Therapy has been working with the patient to improve strength, endurance, ADLs, and transfers d/t generalized weakness.  Patient has no acute concerns today.      Objective  Vital signs: 135/88, 97.6, 85, 18, 96%    Physical Exam  Constitutional:       General: She is not in acute distress.  Eyes:      Extraocular Movements: Extraocular movements intact.   Pulmonary:      Effort: Pulmonary effort is normal.   Musculoskeletal:      Cervical back: Neck supple.   Neurological:      Mental Status: She is alert.   Psychiatric:         Mood and Affect: Mood normal.         Behavior: Behavior is cooperative.         Assessment/Plan  Problem List Items Addressed This Visit       Chronic obstructive pulmonary disease (Multi)     Continue current medication          CHF (congestive heart failure) (Multi)     Continue current medication          Gastroesophageal reflux disease     Continue current medication          HTN (hypertension)     Continue current medication          Anemia - Primary     Monitor Hb          Medications, treatments, and labs reviewed  Continue medications and treatments as listed in [facility]    Scribe Attestation  Clare POND Scribe   attest that this documentation has been prepared under the direction and in the presence of Marco A Jim DO.    Provider Attestation - Scribe documentation  All medical record entries made by the Scribe were at my direction and personally dictated by me. I have reviewed the chart and agree that the record accurately reflects my personal performance of the history, physical exam, discussion and plan.    Marco A Jim DO        Electronically Signed By: Marco A Jim DO    9/2/24  8:04 PM

## 2024-08-27 ENCOUNTER — NURSING HOME VISIT (OUTPATIENT)
Dept: POST ACUTE CARE | Facility: EXTERNAL LOCATION | Age: 74
End: 2024-08-27
Payer: MEDICARE

## 2024-08-27 DIAGNOSIS — J44.9 CHRONIC OBSTRUCTIVE PULMONARY DISEASE, UNSPECIFIED COPD TYPE (MULTI): ICD-10-CM

## 2024-08-27 DIAGNOSIS — I10 PRIMARY HYPERTENSION: Primary | ICD-10-CM

## 2024-08-27 DIAGNOSIS — S82.892A CLOSED LEFT ANKLE FRACTURE, INITIAL ENCOUNTER: ICD-10-CM

## 2024-08-27 DIAGNOSIS — I50.9 CONGESTIVE HEART FAILURE, UNSPECIFIED HF CHRONICITY, UNSPECIFIED HEART FAILURE TYPE (MULTI): ICD-10-CM

## 2024-08-27 PROCEDURE — 99309 SBSQ NF CARE MODERATE MDM 30: CPT | Performed by: REGISTERED NURSE

## 2024-08-27 NOTE — LETTER
Patient: Renee Lombardo  : 1950    Encounter Date: 2024    PROGRESS NOTE    Subjective  Chief complaint: Renee Lombardo is a 73 y.o. female who is an acute skilled patient being seen and evaluated for weakness    HPI:  24 Patient has no new complaints or concerns today.  Continues working towards goals in therapy.  Denies constitutional symptoms.    24 Therapy has been working with the patient to improve strength and endurance with ADLs, transfers, and mobility.  Patient continues to work toward goals.  Patient is stable and has no new complaints.  Nursing staff voices no new concerns today.    24 Patient seen and examined at bedside.  Patient denies n/v/f/c.  Continues working in therapy.  No new complaints.    Objective  Vital signs: 139/68, 97.9, 84, 17, 96%    Physical Exam  Constitutional:       General: She is not in acute distress.  Eyes:      Extraocular Movements: Extraocular movements intact.   Pulmonary:      Effort: Pulmonary effort is normal.   Musculoskeletal:      Cervical back: Neck supple.   Neurological:      Mental Status: She is alert.   Psychiatric:         Mood and Affect: Mood normal.         Behavior: Behavior is cooperative.         Assessment/Plan  Problem List Items Addressed This Visit       Closed left ankle fracture, initial encounter      LLE- WBAT- walking boot   MSP intact   Pain management   Elevation   Therapy         Chronic obstructive pulmonary disease (Multi)     Continue current medications   Schedule duo nebs TID         CHF (congestive heart failure) (Multi)      no SOB, Rales, edema   control BP   EMILIANO diet   monitor weights   Lasix            HTN (hypertension) - Primary      BP within goal   Continue Lasix T, TH, SA, PRICE   routine BMP   monitor BP             Medications, treatments, and labs reviewed  Continue medications and treatments as listed in [facility]    Scribe Attestation  I, Clare Baum , Scribe   attest that this documentation has been  prepared under the direction and in the presence of CARLOS ALBERTO Hughes.    Provider Attestation - Scribe documentation  All medical record entries made by the Scribe were at my direction and personally dictated by me. I have reviewed the chart and agree that the record accurately reflects my personal performance of the history, physical exam, discussion and plan.    CARLOS ALBERTO Hughes        Electronically Signed By: CARLOS ALBERTO Hughes   9/3/24  4:54 PM

## 2024-08-28 ENCOUNTER — NURSING HOME VISIT (OUTPATIENT)
Dept: POST ACUTE CARE | Facility: EXTERNAL LOCATION | Age: 74
End: 2024-08-28
Payer: MEDICARE

## 2024-08-28 DIAGNOSIS — I10 PRIMARY HYPERTENSION: Primary | ICD-10-CM

## 2024-08-28 DIAGNOSIS — J44.9 CHRONIC OBSTRUCTIVE PULMONARY DISEASE, UNSPECIFIED COPD TYPE (MULTI): ICD-10-CM

## 2024-08-28 DIAGNOSIS — I50.9 CONGESTIVE HEART FAILURE, UNSPECIFIED HF CHRONICITY, UNSPECIFIED HEART FAILURE TYPE (MULTI): ICD-10-CM

## 2024-08-28 DIAGNOSIS — D64.9 ANEMIA, UNSPECIFIED TYPE: ICD-10-CM

## 2024-08-28 DIAGNOSIS — K21.9 GASTROESOPHAGEAL REFLUX DISEASE WITHOUT ESOPHAGITIS: ICD-10-CM

## 2024-08-28 PROCEDURE — 99309 SBSQ NF CARE MODERATE MDM 30: CPT | Performed by: REGISTERED NURSE

## 2024-08-28 NOTE — LETTER
Patient: Renee Lombardo  : 1950    Encounter Date: 2024    PROGRESS NOTE    Subjective  Chief complaint: Renee Lombardo is a 73 y.o. female patient being seen and evaluated for monthly general medical care and follow-up    HPI:  Patient presents for general medical care and f/u.  Patient seen and examined at bedside.  No issues per nursing.  Patient has no acute complaints.        Objective  Vital signs: 139/68, 97.9, 84, 17, 96%    Physical Exam  Constitutional:       General: She is not in acute distress.  Eyes:      Extraocular Movements: Extraocular movements intact.   Pulmonary:      Effort: Pulmonary effort is normal.   Musculoskeletal:      Cervical back: Neck supple.   Neurological:      Mental Status: She is alert.   Psychiatric:         Mood and Affect: Mood normal.         Behavior: Behavior is cooperative.         Assessment/Plan  Problem List Items Addressed This Visit       Chronic obstructive pulmonary disease (Multi)     Continue current medications   Schedule duo nebs TID         CHF (congestive heart failure) (Multi)      no SOB, Rales, edema   control BP   EMILIANO diet   monitor weights   Lasix            Gastroesophageal reflux disease      no complaints   reinforced sitting up after eating   Carafate            HTN (hypertension) - Primary      BP within goal   Continue Lasix T, TH, SA, PRICE   routine BMP   monitor BP            Anemia     Monitor hgb           Medications, treatments, and labs reviewed  Continue medications and treatments as listed in EMR    Scribe Attestation  I, Mildred Glasgow   attest that this documentation has been prepared under the direction and in the presence of CARLOS ALBERTO Hughes.    Provider Attestation - Scribe documentation  All medical record entries made by the Scribe were at my direction and personally dictated by me. I have reviewed the chart and agree that the record accurately reflects my personal performance of the history,  physical exam, discussion and plan.    CARLOS ALBERTO Hughes      Electronically Signed By: CARLOS ALBERTO Hughes   9/4/24 10:20 PM

## 2024-08-29 ENCOUNTER — NURSING HOME VISIT (OUTPATIENT)
Dept: POST ACUTE CARE | Facility: EXTERNAL LOCATION | Age: 74
End: 2024-08-29
Payer: MEDICARE

## 2024-08-29 ENCOUNTER — APPOINTMENT (OUTPATIENT)
Dept: ORTHOPEDIC SURGERY | Facility: HOSPITAL | Age: 74
End: 2024-08-29
Payer: MEDICARE

## 2024-08-29 DIAGNOSIS — I10 PRIMARY HYPERTENSION: ICD-10-CM

## 2024-08-29 DIAGNOSIS — J41.8 MIXED SIMPLE AND MUCOPURULENT CHRONIC BRONCHITIS (MULTI): Primary | ICD-10-CM

## 2024-08-29 DIAGNOSIS — S82.892A CLOSED LEFT ANKLE FRACTURE, INITIAL ENCOUNTER: ICD-10-CM

## 2024-08-29 PROCEDURE — 99309 SBSQ NF CARE MODERATE MDM 30: CPT

## 2024-08-29 NOTE — PROGRESS NOTES
PROGRESS NOTE    Subjective   Chief complaint: Renee Lombardo is a 73 y.o. female patient being seen and evaluated for monthly general medical care and follow-up    HPI:  Patient presents for general medical care and f/u.  Patient seen and examined at bedside.  No issues per nursing.  Patient has no acute complaints.        Objective   Vital signs: 139/68, 97.9, 84, 17, 96%    Physical Exam  Constitutional:       General: She is not in acute distress.  Eyes:      Extraocular Movements: Extraocular movements intact.   Pulmonary:      Effort: Pulmonary effort is normal.   Musculoskeletal:      Cervical back: Neck supple.   Neurological:      Mental Status: She is alert.   Psychiatric:         Mood and Affect: Mood normal.         Behavior: Behavior is cooperative.         Assessment/Plan   Problem List Items Addressed This Visit       Chronic obstructive pulmonary disease (Multi)     Continue current medications   Schedule duo nebs TID         CHF (congestive heart failure) (Multi)      no SOB, Rales, edema   control BP   EMILIANO diet   monitor weights   Lasix            Gastroesophageal reflux disease      no complaints   reinforced sitting up after eating   Carafate            HTN (hypertension) - Primary      BP within goal   Continue Lasix T, TH, SA, PRICE   routine BMP   monitor BP            Anemia     Monitor hgb           Medications, treatments, and labs reviewed  Continue medications and treatments as listed in EMR    Scribe Attestation  I, Mildred Glasgow   attest that this documentation has been prepared under the direction and in the presence of CARLOS ALBERTO Hughes.    Provider Attestation - Scribe documentation  All medical record entries made by the Scribe were at my direction and personally dictated by me. I have reviewed the chart and agree that the record accurately reflects my personal performance of the history, physical exam, discussion and plan.    CARLOS ALBERTO Hughes

## 2024-08-29 NOTE — PROGRESS NOTES
PROGRESS NOTE    Subjective   Chief complaint: Renee Lombardo is a 73 y.o. female who is an acute skilled patient being seen and evaluated for weakness    HPI:  8/20/24 Patient has no new complaints or concerns today.  Continues working towards goals in therapy.  Denies constitutional symptoms.    8/21/24 Therapy has been working with the patient to improve strength and endurance with ADLs, transfers, and mobility.  Patient continues to work toward goals.  Patient is stable and has no new complaints.  Nursing staff voices no new concerns today.    8/27/24 Patient seen and examined at bedside.  Patient denies n/v/f/c.  Continues working in therapy.  No new complaints.    Objective   Vital signs: 139/68, 97.9, 84, 17, 96%    Physical Exam  Constitutional:       General: She is not in acute distress.  Eyes:      Extraocular Movements: Extraocular movements intact.   Pulmonary:      Effort: Pulmonary effort is normal.   Musculoskeletal:      Cervical back: Neck supple.   Neurological:      Mental Status: She is alert.   Psychiatric:         Mood and Affect: Mood normal.         Behavior: Behavior is cooperative.         Assessment/Plan   Problem List Items Addressed This Visit       Closed left ankle fracture, initial encounter      LLE- WBAT- walking boot   MSP intact   Pain management   Elevation   Therapy         Chronic obstructive pulmonary disease (Multi)     Continue current medications   Schedule duo nebs TID         CHF (congestive heart failure) (Multi)      no SOB, Rales, edema   control BP   EMILIANO diet   monitor weights   Lasix            HTN (hypertension) - Primary      BP within goal   Continue Lasix T, TH, SA, PRICE   routine BMP   monitor BP             Medications, treatments, and labs reviewed  Continue medications and treatments as listed in PCC    Scribe Attestation  I, Mildred Glasgow   attest that this documentation has been prepared under the direction and in the presence of Marco A GALLEGO  Lemuel, LOURDES-CNP.    Provider Attestation - Scribe documentation  All medical record entries made by the Scribe were at my direction and personally dictated by me. I have reviewed the chart and agree that the record accurately reflects my personal performance of the history, physical exam, discussion and plan.    Marco A Hdez, APRN-CNP

## 2024-08-29 NOTE — LETTER
Patient: Renee Lombardo  : 1950    Encounter Date: 2024    PROGRESS NOTE    Subjective  Chief complaint: Renee Lombardo is a 73 y.o. female who is an acute skilled patient being seen and evaluated for weakness    HPI:  Patient presents for general medical care and f/u.  Patient seen and examined at bedside.  No issues per nursing.  Patient has no acute complaints.    24 Patient has no new complaints or concerns today.  Patient is working in therapy. Tolerating boot well.  Denies n/v/f/c.          Objective  Vital signs:  139/68, 97.9, 84, 17, 96%    Physical Exam  Constitutional:       General: She is not in acute distress.     Appearance: Normal appearance.   HENT:      Head: Normocephalic.      Mouth/Throat:      Mouth: Mucous membranes are moist.   Eyes:      Extraocular Movements: Extraocular movements intact.   Cardiovascular:      Rate and Rhythm: Normal rate and regular rhythm.      Pulses: Normal pulses.      Heart sounds: Normal heart sounds.   Pulmonary:      Effort: Pulmonary effort is normal.      Breath sounds: Normal breath sounds. No wheezing.   Abdominal:      General: Bowel sounds are normal.      Palpations: Abdomen is soft.   Musculoskeletal:         General: Normal range of motion.      Cervical back: Normal range of motion and neck supple.      Comments:  LLE-Walking boot-MSP intact   Skin:     General: Skin is warm and dry.      Capillary Refill: Capillary refill takes less than 2 seconds.   Neurological:      Mental Status: She is alert and oriented to person, place, and time.   Psychiatric:         Mood and Affect: Mood normal.         Behavior: Behavior normal. Behavior is cooperative.         Assessment/Plan  Problem List Items Addressed This Visit       Closed left ankle fracture, initial encounter      LLE- WBAT- walking boot   MSP intact   Pain management   Elevation   Therapy         Chronic obstructive pulmonary disease (Multi) - Primary     Stable  Continue  Albuterol- budesonide, Trelegy         HTN (hypertension)      BP within goal   Continue Lasix T, TH, SA, PRICE   routine BMP   monitor BP             Medications, treatments, and labs reviewed  Continue medications and treatments as listed in EMR    CARLOS ALBERTO Ramirez      Electronically Signed By: CARLOS ALBERTO Ramirez   8/30/24 10:15 PM

## 2024-08-30 ENCOUNTER — NURSING HOME VISIT (OUTPATIENT)
Dept: POST ACUTE CARE | Facility: EXTERNAL LOCATION | Age: 74
End: 2024-08-30
Payer: MEDICARE

## 2024-08-30 DIAGNOSIS — I10 PRIMARY HYPERTENSION: ICD-10-CM

## 2024-08-30 DIAGNOSIS — S82.892A CLOSED LEFT ANKLE FRACTURE, INITIAL ENCOUNTER: ICD-10-CM

## 2024-08-30 DIAGNOSIS — J41.8 MIXED SIMPLE AND MUCOPURULENT CHRONIC BRONCHITIS (MULTI): Primary | ICD-10-CM

## 2024-08-30 PROCEDURE — 99309 SBSQ NF CARE MODERATE MDM 30: CPT

## 2024-08-30 NOTE — PROGRESS NOTES
PROGRESS NOTE    Subjective   Chief complaint: Renee Lombardo is a 73 y.o. female who is an acute skilled patient being seen and evaluated for weakness    HPI:  8/23/24 Patient seen and examined at [facility].  Therapy has been working with the patient to improve strength, endurance, ADLs, and transfers d/t generalized weakness.  Patient has no acute concerns today.      Objective   Vital signs: 135/88, 97.6, 85, 18, 96%    Physical Exam  Constitutional:       General: She is not in acute distress.  Eyes:      Extraocular Movements: Extraocular movements intact.   Pulmonary:      Effort: Pulmonary effort is normal.   Musculoskeletal:      Cervical back: Neck supple.   Neurological:      Mental Status: She is alert.   Psychiatric:         Mood and Affect: Mood normal.         Behavior: Behavior is cooperative.         Assessment/Plan   Problem List Items Addressed This Visit       Chronic obstructive pulmonary disease (Multi)     Continue current medication          CHF (congestive heart failure) (Multi)     Continue current medication          Gastroesophageal reflux disease     Continue current medication          HTN (hypertension)     Continue current medication          Anemia - Primary     Monitor Hb          Medications, treatments, and labs reviewed  Continue medications and treatments as listed in [facility]    Scribe Attestation  IClare Scribe   attest that this documentation has been prepared under the direction and in the presence of Marco A Jim DO.    Provider Attestation - Scribe documentation  All medical record entries made by the Scribe were at my direction and personally dictated by me. I have reviewed the chart and agree that the record accurately reflects my personal performance of the history, physical exam, discussion and plan.    Marco A Jim DO

## 2024-08-30 NOTE — LETTER
Patient: Renee Lombardo  : 1950    Encounter Date: 2024    PROGRESS NOTE    Subjective  Chief complaint: Renee Lombardo is a 73 y.o. female who is an acute skilled patient being seen and evaluated for weakness    HPI:  Patient presents for general medical care and f/u.  Patient seen and examined at bedside.  No issues per nursing.  Patient has no acute complaints.    24 Patient has no new complaints or concerns today.  Patient is working in therapy. Tolerating boot well.  Denies n/v/f/c.    24 Patient has no acute complaints.  No new issues per nursing.  Continues working in therapy.            Objective  Vital signs: .  135/88, 97.6, 85, 18, 97%    Physical Exam  Constitutional:       General: She is not in acute distress.     Appearance: Normal appearance.   HENT:      Head: Normocephalic.      Mouth/Throat:      Mouth: Mucous membranes are moist.   Eyes:      Extraocular Movements: Extraocular movements intact.   Cardiovascular:      Rate and Rhythm: Normal rate and regular rhythm.      Pulses: Normal pulses.      Heart sounds: Normal heart sounds.   Pulmonary:      Effort: Pulmonary effort is normal.      Breath sounds: Normal breath sounds. No wheezing.   Abdominal:      General: Bowel sounds are normal.      Palpations: Abdomen is soft.   Musculoskeletal:         General: Normal range of motion.      Cervical back: Normal range of motion and neck supple.      Comments:  LLE-Walking boot-MSP intact   Skin:     General: Skin is warm and dry.      Capillary Refill: Capillary refill takes less than 2 seconds.   Neurological:      Mental Status: She is alert and oriented to person, place, and time.   Psychiatric:         Mood and Affect: Mood normal.         Behavior: Behavior normal. Behavior is cooperative.         Assessment/Plan  Problem List Items Addressed This Visit       Closed left ankle fracture, initial encounter      LLE- WBAT- walking boot   MSP intact   Pain management    Elevation   Therapy         Chronic obstructive pulmonary disease (Multi) - Primary     Stable  Continue Albuterol- budesonide, Trelegy         HTN (hypertension)      BP within goal   Continue Lasix T, TH, SA, PRICE   routine BMP   monitor BP             Medications, treatments, and labs reviewed  Continue medications and treatments as listed in EMR    CARLOS ALBERTO Ramirez      Electronically Signed By: CARLOS ALBERTO Ramirez   8/30/24 10:17 PM

## 2024-08-31 NOTE — PROGRESS NOTES
PROGRESS NOTE    Subjective   Chief complaint: Renee Lombardo is a 73 y.o. female who is an acute skilled patient being seen and evaluated for weakness    HPI:  Patient presents for general medical care and f/u.  Patient seen and examined at bedside.  No issues per nursing.  Patient has no acute complaints.    8/29/24 Patient has no new complaints or concerns today.  Patient is working in therapy. Tolerating boot well.  Denies n/v/f/c.          Objective   Vital signs:  139/68, 97.9, 84, 17, 96%    Physical Exam  Constitutional:       General: She is not in acute distress.     Appearance: Normal appearance.   HENT:      Head: Normocephalic.      Mouth/Throat:      Mouth: Mucous membranes are moist.   Eyes:      Extraocular Movements: Extraocular movements intact.   Cardiovascular:      Rate and Rhythm: Normal rate and regular rhythm.      Pulses: Normal pulses.      Heart sounds: Normal heart sounds.   Pulmonary:      Effort: Pulmonary effort is normal.      Breath sounds: Normal breath sounds. No wheezing.   Abdominal:      General: Bowel sounds are normal.      Palpations: Abdomen is soft.   Musculoskeletal:         General: Normal range of motion.      Cervical back: Normal range of motion and neck supple.      Comments:  LLE-Walking boot-MSP intact   Skin:     General: Skin is warm and dry.      Capillary Refill: Capillary refill takes less than 2 seconds.   Neurological:      Mental Status: She is alert and oriented to person, place, and time.   Psychiatric:         Mood and Affect: Mood normal.         Behavior: Behavior normal. Behavior is cooperative.         Assessment/Plan   Problem List Items Addressed This Visit       Closed left ankle fracture, initial encounter      LLE- WBAT- walking boot   MSP intact   Pain management   Elevation   Therapy         Chronic obstructive pulmonary disease (Multi) - Primary     Stable  Continue Albuterol- budesonide, Trelegy         HTN (hypertension)      BP within  goal   Continue Lasix T, TH, SA, PRICE   routine BMP   monitor BP             Medications, treatments, and labs reviewed  Continue medications and treatments as listed in EMR    Shawanda Valle, APRN-CNP

## 2024-08-31 NOTE — PROGRESS NOTES
PROGRESS NOTE    Subjective   Chief complaint: Renee Lombardo is a 73 y.o. female who is an acute skilled patient being seen and evaluated for weakness    HPI:  Patient presents for general medical care and f/u.  Patient seen and examined at bedside.  No issues per nursing.  Patient has no acute complaints.    8/29/24 Patient has no new complaints or concerns today.  Patient is working in therapy. Tolerating boot well.  Denies n/v/f/c.    8/30/24 Patient has no acute complaints.  No new issues per nursing.  Continues working in therapy.            Objective   Vital signs: .  135/88, 97.6, 85, 18, 97%    Physical Exam  Constitutional:       General: She is not in acute distress.     Appearance: Normal appearance.   HENT:      Head: Normocephalic.      Mouth/Throat:      Mouth: Mucous membranes are moist.   Eyes:      Extraocular Movements: Extraocular movements intact.   Cardiovascular:      Rate and Rhythm: Normal rate and regular rhythm.      Pulses: Normal pulses.      Heart sounds: Normal heart sounds.   Pulmonary:      Effort: Pulmonary effort is normal.      Breath sounds: Normal breath sounds. No wheezing.   Abdominal:      General: Bowel sounds are normal.      Palpations: Abdomen is soft.   Musculoskeletal:         General: Normal range of motion.      Cervical back: Normal range of motion and neck supple.      Comments:  LLE-Walking boot-MSP intact   Skin:     General: Skin is warm and dry.      Capillary Refill: Capillary refill takes less than 2 seconds.   Neurological:      Mental Status: She is alert and oriented to person, place, and time.   Psychiatric:         Mood and Affect: Mood normal.         Behavior: Behavior normal. Behavior is cooperative.         Assessment/Plan   Problem List Items Addressed This Visit       Closed left ankle fracture, initial encounter      LLE- WBAT- walking boot   MSP intact   Pain management   Elevation   Therapy         Chronic obstructive pulmonary disease (Multi) -  Primary     Stable  Continue Albuterol- budesonide, Trelegy         HTN (hypertension)      BP within goal   Continue Lasix T, TH, SA, PRICE   routine BMP   monitor BP             Medications, treatments, and labs reviewed  Continue medications and treatments as listed in EMR    Shawanda Valle, LOURDES-CNP

## 2024-09-03 ENCOUNTER — NURSING HOME VISIT (OUTPATIENT)
Dept: POST ACUTE CARE | Facility: EXTERNAL LOCATION | Age: 74
End: 2024-09-03
Payer: MEDICARE

## 2024-09-03 DIAGNOSIS — I10 PRIMARY HYPERTENSION: ICD-10-CM

## 2024-09-03 DIAGNOSIS — I50.9 CONGESTIVE HEART FAILURE, UNSPECIFIED HF CHRONICITY, UNSPECIFIED HEART FAILURE TYPE (MULTI): ICD-10-CM

## 2024-09-03 DIAGNOSIS — E03.9 HYPOTHYROIDISM, UNSPECIFIED TYPE: Primary | ICD-10-CM

## 2024-09-03 DIAGNOSIS — J44.9 CHRONIC OBSTRUCTIVE PULMONARY DISEASE, UNSPECIFIED COPD TYPE (MULTI): ICD-10-CM

## 2024-09-03 PROCEDURE — 99309 SBSQ NF CARE MODERATE MDM 30: CPT | Performed by: REGISTERED NURSE

## 2024-09-03 NOTE — LETTER
Patient: Renee Lombardo  : 1950    Encounter Date: 2024    PROGRESS NOTE    Subjective  Chief complaint: Renee Lombardo is a 73 y.o. female who is an acute skilled patient being seen and evaluated for weakness    HPI:  9/3/24 Patient has no new complaints or concerns today.  Continues working towards goals in therapy.  Denies constitutional symptoms.    Objective  Vital signs: 160/93, 97.9, 93, 18, 98%    Physical Exam  Constitutional:       General: She is not in acute distress.  Eyes:      Extraocular Movements: Extraocular movements intact.   Pulmonary:      Effort: Pulmonary effort is normal.   Musculoskeletal:      Cervical back: Neck supple.   Neurological:      Mental Status: She is alert.   Psychiatric:         Mood and Affect: Mood normal.         Behavior: Behavior is cooperative.         Assessment/Plan  Problem List Items Addressed This Visit       Chronic obstructive pulmonary disease (Multi)     Continue current medications   Schedule duo nebs TID         CHF (congestive heart failure) (Multi)      no SOB, Rales, edema   control BP   EMILIANO diet   monitor weights   Lasix            HTN (hypertension)      BP within goal   Continue Lasix T, TH, SA, PRICE   routine BMP   monitor BP            Hypothyroidism - Primary     TSH 6.925  Increase synthroid 112 mcg every day           Medications, treatments, and labs reviewed  Continue medications and treatments as listed in PCC    Scribe Attestation  IClare Scribe   attest that this documentation has been prepared under the direction and in the presence of CARLOS ALBERTO Hughes.    Provider Attestation - Scribe documentation  All medical record entries made by the Scribe were at my direction and personally dictated by me. I have reviewed the chart and agree that the record accurately reflects my personal performance of the history, physical exam, discussion and plan.    CARLOS ALBERTO Hughes        Electronically  Signed By: Marco A Hdez, LOURDES-CNP   9/10/24  7:04 PM

## 2024-09-04 ENCOUNTER — NURSING HOME VISIT (OUTPATIENT)
Dept: POST ACUTE CARE | Facility: EXTERNAL LOCATION | Age: 74
End: 2024-09-04
Payer: MEDICARE

## 2024-09-04 DIAGNOSIS — I50.9 CONGESTIVE HEART FAILURE, UNSPECIFIED HF CHRONICITY, UNSPECIFIED HEART FAILURE TYPE (MULTI): ICD-10-CM

## 2024-09-04 DIAGNOSIS — I10 PRIMARY HYPERTENSION: Primary | ICD-10-CM

## 2024-09-04 DIAGNOSIS — D64.9 ANEMIA, UNSPECIFIED TYPE: ICD-10-CM

## 2024-09-04 DIAGNOSIS — J44.9 CHRONIC OBSTRUCTIVE PULMONARY DISEASE, UNSPECIFIED COPD TYPE (MULTI): ICD-10-CM

## 2024-09-04 PROCEDURE — 99308 SBSQ NF CARE LOW MDM 20: CPT | Performed by: REGISTERED NURSE

## 2024-09-04 NOTE — LETTER
Patient: Renee Lombardo  : 1950    Encounter Date: 2024    PROGRESS NOTE    Subjective  Chief complaint: Renee Lombardo is a 73 y.o. female who is an acute skilled patient being seen and evaluated for weakness    HPI:  9/3/24 Patient has no new complaints or concerns today.  Continues working towards goals in therapy.  Denies constitutional symptoms.    24 Patient has no new complaints or concerns today.  Patient is working in therapy.  Denies n/v/f/c.    Objective  Vital signs: 160/93, 97.9, 93, 18, 98%    Physical Exam  Constitutional:       General: She is not in acute distress.  Eyes:      Extraocular Movements: Extraocular movements intact.   Pulmonary:      Effort: Pulmonary effort is normal.   Musculoskeletal:      Cervical back: Neck supple.   Neurological:      Mental Status: She is alert.   Psychiatric:         Mood and Affect: Mood normal.         Behavior: Behavior is cooperative.         Assessment/Plan  Problem List Items Addressed This Visit       Chronic obstructive pulmonary disease (Multi)     Continue current medications   Schedule duo nebs TID         CHF (congestive heart failure) (Multi)      no SOB, Rales, edema   control BP   EMILIANO diet   monitor weights   Lasix            HTN (hypertension) - Primary      Continue Lasix T, TH, SA, PRICE   routine BMP   monitor BP            Anemia     Monitor hgb           Medications, treatments, and labs reviewed  Continue medications and treatments as listed in P[facility]    Scribe Attestation  I, Mildred Glasgow   attest that this documentation has been prepared under the direction and in the presence of CARLOS ALBERTO Hughes.    Provider Attestation - Scribe documentation  All medical record entries made by the Scribe were at my direction and personally dictated by me. I have reviewed the chart and agree that the record accurately reflects my personal performance of the history, physical exam, discussion and plan.    Marco A  CARLOS ALBERTO Manrique        Electronically Signed By: CARLOS ALBERTO Hughes   9/11/24  9:09 PM

## 2024-09-05 ENCOUNTER — NURSING HOME VISIT (OUTPATIENT)
Dept: POST ACUTE CARE | Facility: EXTERNAL LOCATION | Age: 74
End: 2024-09-05
Payer: MEDICARE

## 2024-09-05 DIAGNOSIS — S82.892A CLOSED LEFT ANKLE FRACTURE, INITIAL ENCOUNTER: Primary | ICD-10-CM

## 2024-09-05 DIAGNOSIS — I10 PRIMARY HYPERTENSION: ICD-10-CM

## 2024-09-05 DIAGNOSIS — J44.9 CHRONIC OBSTRUCTIVE PULMONARY DISEASE, UNSPECIFIED COPD TYPE (MULTI): ICD-10-CM

## 2024-09-05 PROCEDURE — 99309 SBSQ NF CARE MODERATE MDM 30: CPT

## 2024-09-05 NOTE — LETTER
Patient: Renee Lombardo  : 1950    Encounter Date: 2024    PROGRESS NOTE    Subjective  Chief complaint: Renee Lombardo is a 73 y.o. female who is an acute skilled patient being seen and evaluated for weakness    HPI:  Patient presents for general medical care and f/u.  Patient seen and examined at bedside.  No issues per nursing.  Patient has no acute complaints.    24 Patient has no new complaints or concerns today.  Patient is working in therapy. Tolerating boot well.  Denies n/v/f/c.    24 Patient has no acute complaints.  No new issues per nursing.  Continues working in therapy.      24 Patient seen and examined at bedside- in no acute distress.  No new complaints offered today.  Patient is discharging home today.  No concerns per nursing.          Objective  Vital signs: 124/78 - 97.9-93-18-98%    Physical Exam  Constitutional:       General: She is not in acute distress.     Appearance: Normal appearance.   HENT:      Head: Normocephalic.      Mouth/Throat:      Mouth: Mucous membranes are moist.   Eyes:      Extraocular Movements: Extraocular movements intact.   Cardiovascular:      Rate and Rhythm: Normal rate and regular rhythm.      Pulses: Normal pulses.      Heart sounds: Normal heart sounds.   Pulmonary:      Effort: Pulmonary effort is normal.   Abdominal:      General: Bowel sounds are normal.      Palpations: Abdomen is soft.   Musculoskeletal:         General: Normal range of motion.      Cervical back: Normal range of motion and neck supple.      Comments:  LLE-Walking boot-MSP intact   Skin:     General: Skin is warm and dry.      Capillary Refill: Capillary refill takes less than 2 seconds.   Neurological:      Mental Status: She is alert and oriented to person, place, and time.   Psychiatric:         Mood and Affect: Mood normal.         Behavior: Behavior normal. Behavior is cooperative.         Assessment/Plan  Problem List Items Addressed This Visit       Closed  left ankle fracture, initial encounter - Primary      LLE- WBAT- walking boot   MSP intact   Pain management   Elevation   Patient will have assistance once home with boot application            Chronic obstructive pulmonary disease (Multi)     Continue current medications   Schedule duo nebs TID         HTN (hypertension)      BP within goal   Continue Lasix T, TH, SA, PRICE   routine BMP   monitor BP             Medications, treatments, and labs reviewed  Continue medications and treatments as listed in EMR      Scribe Attestation  IChet Scribe   attest that this documentation has been prepared under the direction and in the presence of CARLOS ALBERTO Ramirez    Provider Attestation - Scribe documentation  All medical record entries made by the Scribe were at my direction and personally dictated by me. I have reviewed the chart and agree that the record accurately reflects my personal performance of the history, physical exam, discussion and plan.   CARLOS ALBERTO Ramirez        Electronically Signed By: CARLOS ALBERTO Ramirez   9/10/24  5:37 PM

## 2024-09-09 NOTE — PROGRESS NOTES
PROGRESS NOTE    Subjective   Chief complaint: Renee Lombardo is a 73 y.o. female who is an acute skilled patient being seen and evaluated for weakness    HPI:  Patient presents for general medical care and f/u.  Patient seen and examined at bedside.  No issues per nursing.  Patient has no acute complaints.    8/29/24 Patient has no new complaints or concerns today.  Patient is working in therapy. Tolerating boot well.  Denies n/v/f/c.    8/30/24 Patient has no acute complaints.  No new issues per nursing.  Continues working in therapy.      9/5/24 Patient seen and examined at bedside- in no acute distress.  No new complaints offered today.  Patient is discharging home today.  No concerns per nursing.          Objective   Vital signs: 124/78 - 97.9-93-18-98%    Physical Exam  Constitutional:       General: She is not in acute distress.     Appearance: Normal appearance.   HENT:      Head: Normocephalic.      Mouth/Throat:      Mouth: Mucous membranes are moist.   Eyes:      Extraocular Movements: Extraocular movements intact.   Cardiovascular:      Rate and Rhythm: Normal rate and regular rhythm.      Pulses: Normal pulses.      Heart sounds: Normal heart sounds.   Pulmonary:      Effort: Pulmonary effort is normal.   Abdominal:      General: Bowel sounds are normal.      Palpations: Abdomen is soft.   Musculoskeletal:         General: Normal range of motion.      Cervical back: Normal range of motion and neck supple.      Comments:  LLE-Walking boot-MSP intact   Skin:     General: Skin is warm and dry.      Capillary Refill: Capillary refill takes less than 2 seconds.   Neurological:      Mental Status: She is alert and oriented to person, place, and time.   Psychiatric:         Mood and Affect: Mood normal.         Behavior: Behavior normal. Behavior is cooperative.         Assessment/Plan   Problem List Items Addressed This Visit       Closed left ankle fracture, initial encounter - Primary      LLE- WBAT-  walking boot   MSP intact   Pain management   Elevation   Patient will have assistance once home with boot application            Chronic obstructive pulmonary disease (Multi)     Continue current medications   Schedule duo nebs TID         HTN (hypertension)      BP within goal   Continue Lasix T, TH, SA, PRICE   routine BMP   monitor BP             Medications, treatments, and labs reviewed  Continue medications and treatments as listed in EMR      Scribe Attestation  Chet POND Scribe   attest that this documentation has been prepared under the direction and in the presence of CARLOS ALBERTO Ramirez    Provider Attestation - Scribe documentation  All medical record entries made by the Scribe were at my direction and personally dictated by me. I have reviewed the chart and agree that the record accurately reflects my personal performance of the history, physical exam, discussion and plan.   CARLOS ALBERTO Ramirez

## 2024-09-09 NOTE — ASSESSMENT & PLAN NOTE
LLE- WBAT- walking boot   MSP intact   Pain management   Elevation   Patient will have assistance once home with boot application

## 2024-09-10 PROBLEM — E03.9 HYPOTHYROIDISM: Status: ACTIVE | Noted: 2024-09-10

## 2024-09-10 NOTE — PROGRESS NOTES
PROGRESS NOTE    Subjective   Chief complaint: Renee Lombardo is a 73 y.o. female who is an acute skilled patient being seen and evaluated for weakness    HPI:  9/3/24 Patient has no new complaints or concerns today.  Continues working towards goals in therapy.  Denies constitutional symptoms.    Objective   Vital signs: 160/93, 97.9, 93, 18, 98%    Physical Exam  Constitutional:       General: She is not in acute distress.  Eyes:      Extraocular Movements: Extraocular movements intact.   Pulmonary:      Effort: Pulmonary effort is normal.   Musculoskeletal:      Cervical back: Neck supple.   Neurological:      Mental Status: She is alert.   Psychiatric:         Mood and Affect: Mood normal.         Behavior: Behavior is cooperative.         Assessment/Plan   Problem List Items Addressed This Visit       Chronic obstructive pulmonary disease (Multi)     Continue current medications   Schedule duo nebs TID         CHF (congestive heart failure) (Multi)      no SOB, Rales, edema   control BP   EMILIANO diet   monitor weights   Lasix            HTN (hypertension)      BP within goal   Continue Lasix T, TH, SA, PRICE   routine BMP   monitor BP            Hypothyroidism - Primary     TSH 6.925  Increase synthroid 112 mcg every day           Medications, treatments, and labs reviewed  Continue medications and treatments as listed in PCC    Scribe Attestation  I, Mildred Glasgow   attest that this documentation has been prepared under the direction and in the presence of CARLOS ALBERTO Hughes.    Provider Attestation - Scribe documentation  All medical record entries made by the Scribe were at my direction and personally dictated by me. I have reviewed the chart and agree that the record accurately reflects my personal performance of the history, physical exam, discussion and plan.    CARLOS ALBERTO Hughes

## 2024-09-10 NOTE — PROGRESS NOTES
PROGRESS NOTE    Subjective   Chief complaint: Renee Lombardo is a 73 y.o. female who is an acute skilled patient being seen and evaluated for weakness    HPI:  9/3/24 Patient has no new complaints or concerns today.  Continues working towards goals in therapy.  Denies constitutional symptoms.    9/4/24 Patient has no new complaints or concerns today.  Patient is working in therapy.  Denies n/v/f/c.    Objective   Vital signs: 160/93, 97.9, 93, 18, 98%    Physical Exam  Constitutional:       General: She is not in acute distress.  Eyes:      Extraocular Movements: Extraocular movements intact.   Pulmonary:      Effort: Pulmonary effort is normal.   Musculoskeletal:      Cervical back: Neck supple.   Neurological:      Mental Status: She is alert.   Psychiatric:         Mood and Affect: Mood normal.         Behavior: Behavior is cooperative.         Assessment/Plan   Problem List Items Addressed This Visit       Chronic obstructive pulmonary disease (Multi)     Continue current medications   Schedule duo nebs TID         CHF (congestive heart failure) (Multi)      no SOB, Rales, edema   control BP   EMILIANO diet   monitor weights   Lasix            HTN (hypertension) - Primary      Continue Lasix T, TH, SA, PRICE   routine BMP   monitor BP            Anemia     Monitor hgb           Medications, treatments, and labs reviewed  Continue medications and treatments as listed in PCC    Scribe Attestation  IClare Scribe   attest that this documentation has been prepared under the direction and in the presence of CARLOS ALBERTO Hughes.    Provider Attestation - Scribe documentation  All medical record entries made by the Scribe were at my direction and personally dictated by me. I have reviewed the chart and agree that the record accurately reflects my personal performance of the history, physical exam, discussion and plan.    CARLOS ALBERTO Hughes

## 2024-09-25 ENCOUNTER — HOSPITAL ENCOUNTER (OUTPATIENT)
Dept: RADIOLOGY | Facility: CLINIC | Age: 74
Discharge: HOME | End: 2024-09-25
Payer: MEDICARE

## 2024-09-25 ENCOUNTER — OFFICE VISIT (OUTPATIENT)
Dept: ORTHOPEDIC SURGERY | Facility: CLINIC | Age: 74
End: 2024-09-25
Payer: MEDICARE

## 2024-09-25 VITALS — HEIGHT: 57 IN | BODY MASS INDEX: 31.28 KG/M2 | WEIGHT: 145 LBS

## 2024-09-25 DIAGNOSIS — S82.892A CLOSED LEFT ANKLE FRACTURE, INITIAL ENCOUNTER: ICD-10-CM

## 2024-09-25 DIAGNOSIS — S82.892A CLOSED LEFT ANKLE FRACTURE, INITIAL ENCOUNTER: Primary | ICD-10-CM

## 2024-09-25 PROCEDURE — 99211 OFF/OP EST MAY X REQ PHY/QHP: CPT | Performed by: PHYSICIAN ASSISTANT

## 2024-09-25 PROCEDURE — 73610 X-RAY EXAM OF ANKLE: CPT | Mod: LEFT SIDE | Performed by: RADIOLOGY

## 2024-09-25 PROCEDURE — 1159F MED LIST DOCD IN RCRD: CPT | Performed by: PHYSICIAN ASSISTANT

## 2024-09-25 PROCEDURE — 1160F RVW MEDS BY RX/DR IN RCRD: CPT | Performed by: PHYSICIAN ASSISTANT

## 2024-09-25 PROCEDURE — 1036F TOBACCO NON-USER: CPT | Performed by: PHYSICIAN ASSISTANT

## 2024-09-25 PROCEDURE — 73610 X-RAY EXAM OF ANKLE: CPT | Mod: LT

## 2024-09-25 ASSESSMENT — PAIN - FUNCTIONAL ASSESSMENT: PAIN_FUNCTIONAL_ASSESSMENT: NO/DENIES PAIN

## 2024-09-27 ENCOUNTER — TELEPHONE (OUTPATIENT)
Dept: HOME HEALTH SERVICES | Facility: HOME HEALTH | Age: 74
End: 2024-09-27
Payer: MEDICARE

## 2024-09-27 NOTE — TELEPHONE ENCOUNTER
Dr. Hernandez, [facility] has received your referral. In order to process we will need the face to face note of 9.25.24 uploaded into epic, completed and signed.  Thank you

## 2024-09-29 ENCOUNTER — HOME HEALTH ADMISSION (OUTPATIENT)
Dept: HOME HEALTH SERVICES | Facility: HOME HEALTH | Age: 74
End: 2024-09-29
Payer: MEDICARE

## 2024-09-29 NOTE — PROGRESS NOTES
History of Present Illness   Renee Lombardo is a 73 y.o. female presenting today for post-op check from l ankle hind foot fusion on 7/22/24. Since last visit she was discharged home. Was to have home therapy and outside company was arranged for the patient but she has received no follow up phone calls to schedule. Has been getting around with mild difficulty herself at home but no falls. Has been WB in her boot. Incisions well healed. Denies numbness, tingling, f/c, n/v, CP, SOB, or any other complaints/concerns.      Past Medical History:   Diagnosis Date    COPD (chronic obstructive pulmonary disease) (Multi)     Disease of thyroid gland     GERD (gastroesophageal reflux disease)     Hypothyroidism        Medication Documentation Review Audit       Reviewed by Harriet Dumont on 09/25/24 at 1330      Medication Order Taking? Sig Documenting Provider Last Dose Status   albuterol 90 mcg/actuation inhaler [id number]  Inhale 2 puffs every 4 hours if needed for wheezing or shortness of breath. Historical Provider, MD  Active   alum-mag hydroxide-simeth (Maalox MAX) 400-400-40 mg/5 mL suspension [id number] No Take 5 mL by mouth if needed for indigestion or heartburn. Historical Provider, MD Unknown Active   calcium carbonate-vitamin D3 500 mg-5 mcg (200 unit) tablet [id number]  Take 1 tablet by mouth 2 times a day. Fortunato Goss MD  Active   docusate sodium (Colace) 100 mg capsule [id number] No Take 1 capsule (100 mg) by mouth 2 times a day. Historical MD Uyen Unknown Active   furosemide (Lasix) 40 mg tablet [id number] No Take 1 tablet (40 mg) by mouth once daily on Sunday, Tuesday, Thursday and Saturday Ignacio Qiu MD Unknown Active   ipratropium-albuteroL (Duo-Neb) 0.5-2.5 mg/3 mL nebulizer solution [id number]  Take 3 mL by nebulization. Every 4 to 6 hours as needed Ignacio Qiu MD  Active   levothyroxine (Synthroid, Levoxyl) 100 mcg tablet [id number] No Take 1 tablet (100 mcg) by mouth early in the  morning.. Take on an empty stomach at the same time each day, either 30 to 60 minutes prior to breakfast Ignacio Provider, MD 2024 Active   ondansetron (Zofran) 4 mg tablet [id number]  Take 1 tablet (4 mg) by mouth every 8 hours if needed for nausea or vomiting. Fortunato Goss MD  Active   potassium chloride (Klor-Con) 20 mEq packet [id number] No Take 1 packet (20 mEq) by mouth twice daily on , Tuesday, Thursday and Saturday Historical Provider, MD Unknown Active   sodium chloride (Ocean Nasal) 0.65 % nasal spray [id number]  Administer 2 sprays into each nostril if needed (congestion or nasal dryness). Ignacio Qiu MD  Active   sucralfate (Carafate) 1 gram tablet [id number] No Take 1 tablet (1 g) by mouth 2 times a day. Historical Provider, MD Unknown Active   Trelegy Ellipta 200-62.5-25 mcg blister with device [id number]  Inhale 1 puff once daily. Historical Provider, MD  Active                    Allergies   Allergen Reactions    Ceftriaxone Unknown    Iodinated Contrast Media Unknown    Sulfamide Unknown     SULFA DRUGS       Social History     Socioeconomic History    Marital status:      Spouse name: Not on file    Number of children: Not on file    Years of education: Not on file    Highest education level: Not on file   Occupational History    Not on file   Tobacco Use    Smoking status: Former     Types: Cigarettes     Start date:      Quit date: 1970     Years since quittin.7    Smokeless tobacco: Never   Substance and Sexual Activity    Alcohol use: Yes     Comment: SOCIALLY    Drug use: Never    Sexual activity: Defer   Other Topics Concern    Not on file   Social History Narrative    Not on file     Social Determinants of Health     Financial Resource Strain: Low Risk  (2024)    Overall Financial Resource Strain (CARDIA)     Difficulty of Paying Living Expenses: Not hard at all   Food Insecurity: Not on file   Transportation Needs: No Transportation Needs (2024)     PRAPARE - Transportation     Lack of Transportation (Medical): No     Lack of Transportation (Non-Medical): No   Physical Activity: Not on file   Stress: Not on file   Social Connections: Not on file   Intimate Partner Violence: Not on file   Housing Stability: Unknown (7/20/2024)    Housing Stability Vital Sign     Unable to Pay for Housing in the Last Year: Patient declined     Number of Times Moved in the Last Year: 1     Homeless in the Last Year: No       Past Surgical History:   Procedure Laterality Date    OTHER SURGICAL HISTORY  03/23/2015    Percutaneous Vertebral Augmentation Kyphoplasty          Review of Systems:  30 point ROS reviewed and negative other than as listed in the HPI     Physical Exam:  Gen: The pt is A&Ox3, NAD, and appear state age and weight  Psychiatric: mood and affect are appropriate   Eyes: sclera are white, EOM grossly intact  ENT: MMM  Neck: supple, thyroid is midline  Respiratory: respirations are nonlabored, chest rise symmetric  CV: rate is regular by palpation of distal pulses  Abdomen: nondistended   Integument: no obvious cutaneous lesions noted. No signs of lymphangitis. No signs of systemic edema.   MSK: left ankle surgical incision well healing without edema, erythema, drainage, or other s/sx of infection. Appropriately tender to palpation around the incision. SILT throughout the leg intact. Intact plantarflexion and dorsiflexion. Foot warm and well perfused.      Imaging:  I personally reviewed multiple views of the  left ankle and tib/fib  were obtained in the office today demonstrate maintenance of reduction, interval healing, and a stable position of the hardware.      Assessment   73 y.o. female post-op from L hind foot fusion on 7/22/24.     Plan:  Continue WBAT and wean boot as tolerated. She will be referred to home therapy.        Follow-up 6 weeks with 2 views left tib/fib, 3 views left ankle.     All of the patient's questions/concerns address and they are in  agreement with the plan.

## 2024-10-01 ENCOUNTER — HOME CARE VISIT (OUTPATIENT)
Dept: HOME HEALTH SERVICES | Facility: HOME HEALTH | Age: 74
End: 2024-10-01

## 2024-10-03 ENCOUNTER — HOME CARE VISIT (OUTPATIENT)
Dept: HOME HEALTH SERVICES | Facility: HOME HEALTH | Age: 74
End: 2024-10-03
Payer: MEDICARE

## 2024-10-03 VITALS
HEART RATE: 79 BPM | OXYGEN SATURATION: 96 % | RESPIRATION RATE: 20 BRPM | SYSTOLIC BLOOD PRESSURE: 132 MMHG | HEIGHT: 57 IN | DIASTOLIC BLOOD PRESSURE: 80 MMHG | TEMPERATURE: 100.4 F | BODY MASS INDEX: 31.28 KG/M2 | WEIGHT: 145 LBS

## 2024-10-03 PROCEDURE — G0151 HHCP-SERV OF PT,EA 15 MIN: HCPCS | Mod: HHH

## 2024-10-03 PROCEDURE — 169592 NO-PAY CLAIM PROCEDURE

## 2024-10-03 SDOH — HEALTH STABILITY: PHYSICAL HEALTH: EXERCISE ACTIVITIES SETS: 1

## 2024-10-03 SDOH — HEALTH STABILITY: PHYSICAL HEALTH: PHYSICAL EXERCISE: SUPINE

## 2024-10-03 SDOH — HEALTH STABILITY: PHYSICAL HEALTH: PHYSICAL EXERCISE: 10

## 2024-10-03 SDOH — HEALTH STABILITY: PHYSICAL HEALTH: EXERCISE ACTIVITY: ANKLE MOVEMENTS, KNEE TO CHEST, HIP ABD, SAQ

## 2024-10-03 SDOH — HEALTH STABILITY: PHYSICAL HEALTH: EXERCISE TYPE: WHEP

## 2024-10-03 ASSESSMENT — ACTIVITIES OF DAILY LIVING (ADL)
AMBULATION ASSISTANCE ON FLAT SURFACES: 1
CURRENT_FUNCTION: SUPERVISION
ENTERING_EXITING_HOME: MODERATE ASSIST
AMBULATION ASSISTANCE: STAND BY ASSIST
AMBULATION_DISTANCE/DURATION_TOLERATED: 20 FEET X2
AMBULATION ASSISTANCE: 1
PHYSICAL TRANSFERS ASSESSED: 1
OASIS_M1830: 05

## 2024-10-03 ASSESSMENT — ENCOUNTER SYMPTOMS
SUBJECTIVE PAIN PROGRESSION: GRADUALLY IMPROVING
HIGHEST PAIN SEVERITY IN PAST 24 HOURS: 6/10
PAIN SEVERITY GOAL: 0/10
OCCASIONAL FEELINGS OF UNSTEADINESS: 1
PAIN: 1
PERSON REPORTING PAIN: PATIENT
LOWEST PAIN SEVERITY IN PAST 24 HOURS: 1/10
PAIN LOCATION: LEFT FOOT

## 2024-10-07 ENCOUNTER — HOME CARE VISIT (OUTPATIENT)
Dept: HOME HEALTH SERVICES | Facility: HOME HEALTH | Age: 74
End: 2024-10-07
Payer: MEDICARE

## 2024-10-07 PROCEDURE — G0157 HHC PT ASSISTANT EA 15: HCPCS | Mod: CQ,HHH

## 2024-10-07 ASSESSMENT — ENCOUNTER SYMPTOMS
DENIES PAIN: 1
PERSON REPORTING PAIN: PATIENT

## 2024-10-11 ENCOUNTER — HOME CARE VISIT (OUTPATIENT)
Dept: HOME HEALTH SERVICES | Facility: HOME HEALTH | Age: 74
End: 2024-10-11
Payer: MEDICARE

## 2024-10-11 SDOH — HEALTH STABILITY: PHYSICAL HEALTH: PHYSICAL EXERCISE: 10

## 2024-10-11 SDOH — HEALTH STABILITY: PHYSICAL HEALTH: PHYSICAL EXERCISE: SITTING

## 2024-10-11 SDOH — HEALTH STABILITY: PHYSICAL HEALTH: EXERCISE ACTIVITIES SETS: 1

## 2024-10-11 SDOH — HEALTH STABILITY: PHYSICAL HEALTH: EXERCISE TYPE: WHEP

## 2024-10-11 SDOH — HEALTH STABILITY: PHYSICAL HEALTH: EXERCISE ACTIVITY: ANKLE PUMPS, LAQ, HIP FLEX/ABD/ADD

## 2024-10-12 NOTE — CASE COMMUNICATION
Patient requesting PT discharge without visit 10.11.24 with no further [facility] services active.  Instructed patient in signs and symptoms of infection, when to call MD or 911, safe progression of WHEP, fall prevention, safety with household transfers and ambulation on level and stairs.

## 2024-11-07 ENCOUNTER — HOSPITAL ENCOUNTER (OUTPATIENT)
Dept: RADIOLOGY | Facility: HOSPITAL | Age: 74
Discharge: HOME | End: 2024-11-07
Payer: MEDICARE

## 2024-11-07 DIAGNOSIS — R91.8 OTHER NONSPECIFIC ABNORMAL FINDING OF LUNG FIELD: ICD-10-CM

## 2024-11-07 PROCEDURE — 71250 CT THORAX DX C-: CPT

## 2024-11-07 PROCEDURE — 71250 CT THORAX DX C-: CPT | Performed by: RADIOLOGY

## 2024-11-19 ENCOUNTER — HOSPITAL ENCOUNTER (OUTPATIENT)
Dept: RADIOLOGY | Facility: CLINIC | Age: 74
Discharge: HOME | End: 2024-11-19
Payer: MEDICARE

## 2024-11-19 ENCOUNTER — OFFICE VISIT (OUTPATIENT)
Dept: ORTHOPEDIC SURGERY | Facility: CLINIC | Age: 74
End: 2024-11-19
Payer: MEDICARE

## 2024-11-19 VITALS — BODY MASS INDEX: 31.28 KG/M2 | HEIGHT: 57 IN | WEIGHT: 145 LBS

## 2024-11-19 DIAGNOSIS — S82.892A CLOSED LEFT ANKLE FRACTURE, INITIAL ENCOUNTER: ICD-10-CM

## 2024-11-19 DIAGNOSIS — S82.892A CLOSED LEFT ANKLE FRACTURE, INITIAL ENCOUNTER: Primary | ICD-10-CM

## 2024-11-19 PROCEDURE — 73590 X-RAY EXAM OF LOWER LEG: CPT | Mod: LT

## 2024-11-19 PROCEDURE — 73590 X-RAY EXAM OF LOWER LEG: CPT | Mod: LEFT SIDE | Performed by: RADIOLOGY

## 2024-11-19 PROCEDURE — 1160F RVW MEDS BY RX/DR IN RCRD: CPT | Performed by: PHYSICIAN ASSISTANT

## 2024-11-19 PROCEDURE — 99213 OFFICE O/P EST LOW 20 MIN: CPT | Performed by: PHYSICIAN ASSISTANT

## 2024-11-19 PROCEDURE — 1159F MED LIST DOCD IN RCRD: CPT | Performed by: PHYSICIAN ASSISTANT

## 2024-11-19 PROCEDURE — 73610 X-RAY EXAM OF ANKLE: CPT | Mod: LT

## 2024-11-19 PROCEDURE — 1036F TOBACCO NON-USER: CPT | Performed by: PHYSICIAN ASSISTANT

## 2024-11-19 PROCEDURE — 3008F BODY MASS INDEX DOCD: CPT | Performed by: PHYSICIAN ASSISTANT

## 2024-11-19 PROCEDURE — 73610 X-RAY EXAM OF ANKLE: CPT | Mod: LEFT SIDE | Performed by: RADIOLOGY

## 2024-11-19 ASSESSMENT — PAIN - FUNCTIONAL ASSESSMENT: PAIN_FUNCTIONAL_ASSESSMENT: NO/DENIES PAIN

## 2024-11-19 NOTE — PROGRESS NOTES
History of Present Illness   Renee Lombardo is a 73 y.o. female presenting today for post-op check from l ankle hind foot fusion on 7/22/24.  After the last visit we tried to arrange for home therapy but she was not happy with the services provided and discharged them.  She has been doing therapy herself at home and happy with the progress that she made.  She gets around a lot at home herself outside of her boot but wears a boot for long distances.  She feels that she has been using the boot less and less slow.  Incisions remain well-healed.  No other complaints or concerns at this time.     Past Medical History:   Diagnosis Date    COPD (chronic obstructive pulmonary disease) (Multi)     Disease of thyroid gland     GERD (gastroesophageal reflux disease)     Hypothyroidism        Medication Documentation Review Audit       Reviewed by Harriet Dumont on 11/19/24 at 1309      Medication Order Taking? Sig Documenting Provider Last Dose Status   albuterol 90 mcg/actuation inhaler [id number]  Inhale 2 puffs every 4 hours if needed for wheezing or shortness of breath. Historical Provider, MD  Active   alum-mag hydroxide-simeth (Maalox MAX) 400-400-40 mg/5 mL suspension [id number] No Take 5 mL by mouth if needed for indigestion or heartburn. Historical Provider, MD Unknown Active   docusate sodium (Colace) 100 mg capsule [id number] No Take 1 capsule (100 mg) by mouth 2 times a day. Historical Provider, MD Unknown Active   furosemide (Lasix) 40 mg tablet [id number] No Take 1 tablet (40 mg) by mouth once daily on Sunday, Tuesday, Thursday and Saturday Historical MD Uyen Unknown Active   ipratropium-albuteroL (Duo-Neb) 0.5-2.5 mg/3 mL nebulizer solution [id number]  Take 3 mL by nebulization. Every 4 to 6 hours as needed Historical Provider, MD  Active   levothyroxine (Synthroid, Levoxyl) 100 mcg tablet [id number] No Take 1 tablet (100 mcg) by mouth early in the morning.. Take on an empty stomach at the same time each day,  either 30 to 60 minutes prior to breakfast Historical Provider, MD 2024 Active   ondansetron (Zofran) 4 mg tablet [id number]  Take 1 tablet (4 mg) by mouth every 8 hours if needed for nausea or vomiting. Fortunato Goss MD  Active   potassium chloride (Klor-Con) 20 mEq packet [id number] No Take 1 packet (20 mEq) by mouth twice daily on , Tuesday, Thursday and Saturday Historical Provider, MD Unknown Active   sodium chloride (Ocean Nasal) 0.65 % nasal spray [id number]  Administer 2 sprays into each nostril if needed (congestion or nasal dryness). Ignacio Qiu MD  Active   sucralfate (Carafate) 1 gram tablet [id number] No Take 1 tablet (1 g) by mouth 2 times a day. Historical Provider, MD Unknown Active   Trelegy Ellipta 200-62.5-25 mcg blister with device [id number]  Inhale 1 puff once daily. Historical Provider, MD  Active                    Allergies   Allergen Reactions    Ceftriaxone Unknown    Iodinated Contrast Media Unknown    Sulfamide Unknown     SULFA DRUGS       Social History     Socioeconomic History    Marital status:      Spouse name: Not on file    Number of children: Not on file    Years of education: Not on file    Highest education level: Not on file   Occupational History    Not on file   Tobacco Use    Smoking status: Former     Types: Cigarettes     Start date:      Quit date: 1970     Years since quittin.9    Smokeless tobacco: Never   Substance and Sexual Activity    Alcohol use: Yes     Comment: SOCIALLY    Drug use: Never    Sexual activity: Defer   Other Topics Concern    Not on file   Social History Narrative    Not on file     Social Drivers of Health     Financial Resource Strain: Low Risk  (2024)    Overall Financial Resource Strain (CARDIA)     Difficulty of Paying Living Expenses: Not hard at all   Food Insecurity: Not on file   Transportation Needs: No Transportation Needs (10/3/2024)    OASIS : Transportation     Lack of Transportation (Medical):  No     Lack of Transportation (Non-Medical): No     Patient Unable or Declines to Respond: No   Physical Activity: Not on file   Stress: Not on file   Social Connections: Feeling Somewhat Isolated (10/3/2024)    OASIS : Social Isolation     Frequency of experiencing loneliness or isolation: Sometimes   Intimate Partner Violence: Not on file   Housing Stability: Unknown (7/20/2024)    Housing Stability Vital Sign     Unable to Pay for Housing in the Last Year: Patient declined     Number of Times Moved in the Last Year: 1     Homeless in the Last Year: No       Past Surgical History:   Procedure Laterality Date    OTHER SURGICAL HISTORY  03/23/2015    Percutaneous Vertebral Augmentation Kyphoplasty          Review of Systems:  30 point ROS reviewed and negative other than as listed in the HPI     Physical Exam:  Gen: The pt is A&Ox3, NAD, and appear state age and weight  Psychiatric: mood and affect are appropriate   Eyes: sclera are white, EOM grossly intact  ENT: MMM  Neck: supple, thyroid is midline  Respiratory: respirations are nonlabored, chest rise symmetric  CV: rate is regular by palpation of distal pulses  Abdomen: nondistended   Integument: no obvious cutaneous lesions noted. No signs of lymphangitis. No signs of systemic edema.   MSK: left ankle surgical incision well healing without edema, erythema, drainage, or other s/sx of infection. Appropriately tender to palpation around the incision. SILT throughout the leg intact.  Wiggles toes foot warm and well perfused.      Imaging:  I personally reviewed multiple views of the  left ankle and tib/fib  were obtained in the office today demonstrate maintenance of reduction, interval healing, and a stable position of the hardware.      Assessment   73 y.o. female post-op from L hind foot fusion on 7/22/24.     Plan:  Continue WBAT and continue to wean boot as tolerated.  We discussed referral to outpatient therapy but at this time she is happy with the  progress that she has made on her own and would like to continue to just rehab herself at this point.  She can follow-up on an as-needed basis.  All of the patient's questions/concerns address and they are in agreement with the plan.

## 2024-11-21 PROBLEM — A49.8 E COLI INFECTION: Status: ACTIVE | Noted: 2024-11-21

## 2024-11-22 ENCOUNTER — APPOINTMENT (OUTPATIENT)
Dept: ORTHOPEDIC SURGERY | Facility: HOSPITAL | Age: 74
End: 2024-11-22
Payer: MEDICARE

## 2024-11-22 NOTE — PROGRESS NOTES
PROGRESS NOTE    Subjective   Chief complaint: Renee Lombardo is a 73 y.o. female who is an acute skilled patient being seen and evaluated for weakness    HPI:  8/5/24 Patient has no new complaints or concerns today.  Continues working towards goals in therapy.  Denies constitutional symptoms.    8/6/24 Patient presents for f/u therapy and general medical care.  Patient seen and examined at [facility].  Therapy has been working with the patient to improve strength, endurance, ADLs, and transfers d/t generalized weakness.  Patient has no acute concerns today.    8/12/24 Patient has been working in therapy to improve strength, endurance, and ADLs.  Patient continues to work toward goals.  No new concerns today.  Denies n/v/f/c pain.      8/13/24 Therapy has been working with the patient to improve strength and endurance with ADLs, transfers, and mobility.  Patient continues to work toward goals.  Patient is stable and has no new complaints.  Nursing staff voices no new concerns today.    8/14/24 Patient has no new complaints or concerns today.  Patient is working in therapy.  Denies n/v/f/c.    8/15/24  Patient has no acute complaints.  No new issues per nursing.  Continues working in therapy. Sutures intact.  WBAT with walking boot.    8/19/2024 Patient in therapy d/t generalized weakness.  Patient presents for f/u.  Continues to work toward goals in therapy.  No new complaints at this time.          Objective   Vital signs: 124/68, 97.6, 74, 18, 97%    Physical Exam  Constitutional:       General: She is not in acute distress.  Eyes:      Extraocular Movements: Extraocular movements intact.   Cardiovascular:      Rate and Rhythm: Normal rate and regular rhythm.   Pulmonary:      Effort: Pulmonary effort is normal.      Breath sounds: Normal breath sounds.   Abdominal:      General: Bowel sounds are normal.      Palpations: Abdomen is soft.   Musculoskeletal:      Cervical back: Neck supple.      Right lower leg: No  edema.      Left lower leg: No edema.   Neurological:      Mental Status: She is alert.      Motor: Weakness present.   Psychiatric:         Mood and Affect: Mood normal.         Assessment/Plan   Problem List Items Addressed This Visit       Chronic obstructive pulmonary disease (Multi)     Continue current medications   Schedule duo nebs TID         HTN (hypertension)      Continue Lasix T, TH, SA, PRICE   routine BMP   monitor BP            E coli infection - Primary     Cipro  Monitor        Start Cipro for UTI, patient has grown pansensitive E. coli in the past    Medications, treatments, and labs reviewed  Continue medications and treatments as listed in EMR      Scribe Attestation  IChet Scribe   attest that this documentation has been prepared under the direction and in the presence of Marco A Jim DO    Provider Attestation - Scribe documentation  All medical record entries made by the Scribe were at my direction and personally dictated by me. I have reviewed the chart and agree that the record accurately reflects my personal performance of the history, physical exam, discussion and plan.   Marco A Jim DO

## 2024-12-03 SDOH — HEALTH STABILITY: PHYSICAL HEALTH: PHYSICAL EXERCISE: SUPINE

## 2024-12-03 SDOH — HEALTH STABILITY: PHYSICAL HEALTH: EXERCISE ACTIVITY: SUPINE STRENGTHENING AND STRETCHING ANKLE

## 2024-12-03 SDOH — HEALTH STABILITY: PHYSICAL HEALTH: EXERCISE ACTIVITIES SETS: 1

## 2024-12-03 SDOH — HEALTH STABILITY: PHYSICAL HEALTH: PHYSICAL EXERCISE: 5

## 2024-12-03 SDOH — HEALTH STABILITY: PHYSICAL HEALTH: EXERCISE TYPE: WHEP

## 2024-12-03 ASSESSMENT — ACTIVITIES OF DAILY LIVING (ADL)
OASIS_M1830: 02
HOME_HEALTH_OASIS: 01

## 2024-12-03 NOTE — CASE COMMUNICATION
Patient to be discharged 10.11.24 without PT visit at her request after only 2 PT [facility] visits.  She states she can perform therapy on her own at this time.Patient instructed in signs and symptoms of infection, when to call MD or 911, fall prevention, safe progression of WHEP, safety with household transfers and ambulation with assistive device.

## 2024-12-18 ENCOUNTER — APPOINTMENT (OUTPATIENT)
Dept: RADIOLOGY | Facility: CLINIC | Age: 74
End: 2024-12-18
Payer: MEDICARE

## (undated) DEVICE — DRESSING, GAUZE, WASHED FLUFF, LARGE, STERILE

## (undated) DEVICE — SUTURE, ETHILON, 2-0, FSLX 30, BLACK

## (undated) DEVICE — Device

## (undated) DEVICE — ELECTRODE, ELECTROSURGICAL, BLADE, INSULATED, ENT/IMA, STERILE

## (undated) DEVICE — TOWEL, SURGICAL, NEURO, O/R, 16 X 26, BLUE, STERILE

## (undated) DEVICE — BANDAGE, GAUZE, CONFORMING, KERLIX, 6 PLY, 4.5 IN X 4.1 YD

## (undated) DEVICE — ELECTRODE, ELECTROSURGICAL, BLADE, STANDARD, 2.75 IN

## (undated) DEVICE — APPLICATOR, CHLORAPREP, W/ORANGE TINT, 26ML

## (undated) DEVICE — SUTURE, MONOCRYL, 2-0, 27 IN, SH/V-20 , UNDYED

## (undated) DEVICE — COVER, C-ARM W/CLIPS, OEC GE

## (undated) DEVICE — APPLICATOR, PREP, CHLORAPREP, W/ORANGE TINT, 10.5ML

## (undated) DEVICE — COVER, BACK TABLE, 65 X 90, HVY REINFORCED

## (undated) DEVICE — TIP, SUCTION, FRAZIER, W/CONTROL VENT, 12 FR

## (undated) DEVICE — SUTURE, VICRYL, 2-0, 27 IN, FSL, UNDYED

## (undated) DEVICE — DRAPE, INCISE, ANTIMICROBIAL, IOBAN 2, LARGE, 17 X 23 IN, DISPOSABLE, STERILE

## (undated) DEVICE — STAPLER, SKIN PROXIMATE, 35 WIDE

## (undated) DEVICE — SUTURE, MONOSOF, 2-0, 30 IN, C15, BLACK

## (undated) DEVICE — PREP, IODOPHOR, W/ALCOHOL, DURAPREP, W/APPLICATOR, 26 CC

## (undated) DEVICE — DRAPE COVER, C ARM, FLOUROSCAN IMAGING SYS

## (undated) DEVICE — GUIDEWIRE, 2.2 X 800 SMOOTH TIP

## (undated) DEVICE — SUTURE, VICRYL, 1, 27 IN, CT-1, VIOLET

## (undated) DEVICE — BANDAGE, ELASTIC, MATRIX, SELF-CLOSURE, 6 IN X 5 YD, LF

## (undated) DEVICE — BANDAGE, COFLEX, 4 X 5 YDS, TAN, STERILE, LF

## (undated) DEVICE — BANDAGE, ELASTIC, MATRIX, SELF-CLOSURE, 4 IN X 5 YD, LF

## (undated) DEVICE — SUTURE, PDS II, 0, 27 IN, CT-2, VIOLET

## (undated) DEVICE — PROTECTOR, NERVE, ULNAR, PINK

## (undated) DEVICE — COVER, CART, 45 X 27 X 48 IN, CLEAR

## (undated) DEVICE — DRAPE, SHEET, U, W/ADHESIVE STRIP, IMPERVIOUS, 60 X 70 IN, DISPOSABLE, LF, STERILE

## (undated) DEVICE — DRAPE, SHEET, THREE QUARTER, FAN FOLD, 57 X 77 IN

## (undated) DEVICE — PADDING, WEBRIL, UNDERCAST, STERILE, 3 IN

## (undated) DEVICE — BANDAGE, ESMARK, 6 IN X 9 FT, STERILE

## (undated) DEVICE — SUTURE, ETHILON, 3-0, 30 IN, FS-1, BLACK

## (undated) DEVICE — PREP, SKIN, DURAPREP, 6 CC

## (undated) DEVICE — IRRIGATION SET, Y, LARGE BORE

## (undated) DEVICE — PADDING, WEBRIL, UNDERCAST, STERILE, 4 IN